# Patient Record
Sex: FEMALE | Race: WHITE | NOT HISPANIC OR LATINO | Employment: FULL TIME | URBAN - METROPOLITAN AREA
[De-identification: names, ages, dates, MRNs, and addresses within clinical notes are randomized per-mention and may not be internally consistent; named-entity substitution may affect disease eponyms.]

---

## 2023-02-24 LAB
ANION GAP IN SER/PLAS: 13 MMOL/L (ref 10–20)
C REACTIVE PROTEIN (MG/L) IN SER/PLAS: 0.13 MG/DL
CALCIUM (MG/DL) IN SER/PLAS: 9 MG/DL (ref 8.6–10.6)
CARBON DIOXIDE, TOTAL (MMOL/L) IN SER/PLAS: 26 MMOL/L (ref 21–32)
CHLORIDE (MMOL/L) IN SER/PLAS: 105 MMOL/L (ref 98–107)
CREATININE (MG/DL) IN SER/PLAS: 0.68 MG/DL (ref 0.5–1.05)
ERYTHROCYTE DISTRIBUTION WIDTH (RATIO) BY AUTOMATED COUNT: 15.6 % (ref 11.5–14.5)
ERYTHROCYTE MEAN CORPUSCULAR HEMOGLOBIN CONCENTRATION (G/DL) BY AUTOMATED: 31.4 G/DL (ref 32–36)
ERYTHROCYTE MEAN CORPUSCULAR VOLUME (FL) BY AUTOMATED COUNT: 92 FL (ref 80–100)
ERYTHROCYTES (10*6/UL) IN BLOOD BY AUTOMATED COUNT: 4.2 X10E12/L (ref 4–5.2)
GFR FEMALE: >90 ML/MIN/1.73M2
GLUCOSE (MG/DL) IN SER/PLAS: 171 MG/DL (ref 74–99)
HEMATOCRIT (%) IN BLOOD BY AUTOMATED COUNT: 38.5 % (ref 36–46)
HEMOGLOBIN (G/DL) IN BLOOD: 12.1 G/DL (ref 12–16)
LEUKOCYTES (10*3/UL) IN BLOOD BY AUTOMATED COUNT: 24.7 X10E9/L (ref 4.4–11.3)
NRBC (PER 100 WBCS) BY AUTOMATED COUNT: 0 /100 WBC (ref 0–0)
PLATELETS (10*3/UL) IN BLOOD AUTOMATED COUNT: 419 X10E9/L (ref 150–450)
POTASSIUM (MMOL/L) IN SER/PLAS: 3.9 MMOL/L (ref 3.5–5.3)
SODIUM (MMOL/L) IN SER/PLAS: 140 MMOL/L (ref 136–145)
UREA NITROGEN (MG/DL) IN SER/PLAS: 19 MG/DL (ref 6–23)

## 2023-02-27 PROBLEM — R53.83 FATIGUE: Status: ACTIVE | Noted: 2023-02-27

## 2023-02-27 PROBLEM — F43.20 GRIEF REACTION: Status: ACTIVE | Noted: 2023-02-27

## 2023-02-27 PROBLEM — K51.90 ULCERATIVE COLITIS (MULTI): Status: ACTIVE | Noted: 2023-02-27

## 2023-02-27 PROBLEM — E78.00 HYPERCHOLESTEREMIA: Status: ACTIVE | Noted: 2023-02-27

## 2023-02-27 PROBLEM — N94.9: Status: ACTIVE | Noted: 2023-02-27

## 2023-02-27 PROBLEM — R35.0 URINE FREQUENCY: Status: ACTIVE | Noted: 2023-02-27

## 2023-02-27 PROBLEM — B37.31 YEAST VAGINITIS: Status: ACTIVE | Noted: 2023-02-27

## 2023-02-27 PROBLEM — N30.90 CYSTITIS: Status: ACTIVE | Noted: 2023-02-27

## 2023-02-27 PROBLEM — K58.9 IRRITABLE BOWEL SYNDROME: Status: ACTIVE | Noted: 2023-02-27

## 2023-02-27 PROBLEM — R79.89 ELEVATED LFTS: Status: ACTIVE | Noted: 2023-02-27

## 2023-02-27 PROBLEM — M25.50 JOINT PAIN: Status: ACTIVE | Noted: 2023-02-27

## 2023-02-27 PROBLEM — K76.0 FATTY LIVER: Status: ACTIVE | Noted: 2023-02-27

## 2023-02-27 PROBLEM — R10.9 ABDOMINAL PAIN: Status: ACTIVE | Noted: 2023-02-27

## 2023-02-27 PROBLEM — R39.89 SENSATION OF PRESSURE IN BLADDER AREA: Status: ACTIVE | Noted: 2023-02-27

## 2023-02-27 PROBLEM — R30.0 DYSURIA: Status: ACTIVE | Noted: 2023-02-27

## 2023-02-27 PROBLEM — E55.9 VITAMIN D DEFICIENCY: Status: ACTIVE | Noted: 2023-02-27

## 2023-02-27 PROBLEM — F43.21 GRIEF REACTION: Status: ACTIVE | Noted: 2023-02-27

## 2023-02-27 RX ORDER — DICYCLOMINE HYDROCHLORIDE 10 MG/1
CAPSULE ORAL
COMMUNITY
Start: 2021-08-06 | End: 2023-07-12

## 2023-02-27 RX ORDER — HYDROCORTISONE SODIUM SUCCINATE 100 MG/2ML
INJECTION, POWDER, FOR SOLUTION INTRAMUSCULAR; INTRAVENOUS
COMMUNITY
Start: 2021-11-06 | End: 2023-07-12

## 2023-02-27 RX ORDER — NITROFURANTOIN 25; 75 MG/1; MG/1
CAPSULE ORAL EVERY 12 HOURS
COMMUNITY
Start: 2022-07-21 | End: 2023-07-12

## 2023-02-27 RX ORDER — DIGITAL THERAPEUTICS, IBS
MISCELLANEOUS MISCELLANEOUS
COMMUNITY
Start: 2022-04-29 | End: 2023-07-12

## 2023-02-27 RX ORDER — NORETHINDRONE 0.35 MG/1
1 TABLET ORAL DAILY
COMMUNITY
Start: 2021-04-28 | End: 2023-07-12

## 2023-02-27 RX ORDER — PREDNISONE 5 MG/1
TABLET ORAL
COMMUNITY
Start: 2022-06-27 | End: 2023-07-12

## 2023-02-27 RX ORDER — ESCITALOPRAM OXALATE 20 MG/1
TABLET ORAL
COMMUNITY
Start: 2022-04-26

## 2023-02-27 RX ORDER — ATORVASTATIN CALCIUM 20 MG/1
1 TABLET, FILM COATED ORAL NIGHTLY
COMMUNITY
End: 2023-12-01 | Stop reason: WASHOUT

## 2023-02-27 RX ORDER — ATORVASTATIN CALCIUM 10 MG/1
TABLET, FILM COATED ORAL
COMMUNITY
End: 2023-07-12

## 2023-02-27 RX ORDER — ERGOCALCIFEROL 1.25 MG/1
1 CAPSULE ORAL
COMMUNITY
Start: 2021-06-29 | End: 2024-01-18

## 2023-02-27 RX ORDER — PANTOPRAZOLE SODIUM 40 MG/1
1 TABLET, DELAYED RELEASE ORAL DAILY
COMMUNITY
Start: 2021-04-28 | End: 2023-12-01 | Stop reason: WASHOUT

## 2023-02-27 RX ORDER — ACETAMINOPHEN 325 MG/1
TABLET ORAL
COMMUNITY
Start: 2021-11-06 | End: 2023-07-12

## 2023-04-13 LAB
ALANINE AMINOTRANSFERASE (SGPT) (U/L) IN SER/PLAS: 54 U/L (ref 7–45)
ALBUMIN (G/DL) IN SER/PLAS: 4 G/DL (ref 3.4–5)
ALKALINE PHOSPHATASE (U/L) IN SER/PLAS: 99 U/L (ref 33–110)
ASPARTATE AMINOTRANSFERASE (SGOT) (U/L) IN SER/PLAS: 23 U/L (ref 9–39)
BILIRUBIN DIRECT (MG/DL) IN SER/PLAS: 0 MG/DL (ref 0–0.3)
BILIRUBIN TOTAL (MG/DL) IN SER/PLAS: 0.3 MG/DL (ref 0–1.2)
CHOLESTEROL (MG/DL) IN SER/PLAS: 257 MG/DL (ref 0–199)
CHOLESTEROL IN HDL (MG/DL) IN SER/PLAS: 77.2 MG/DL
CHOLESTEROL/HDL RATIO: 3.3
ERYTHROCYTE DISTRIBUTION WIDTH (RATIO) BY AUTOMATED COUNT: 16.1 % (ref 11.5–14.5)
ERYTHROCYTE MEAN CORPUSCULAR HEMOGLOBIN CONCENTRATION (G/DL) BY AUTOMATED: 31.3 G/DL (ref 32–36)
ERYTHROCYTE MEAN CORPUSCULAR VOLUME (FL) BY AUTOMATED COUNT: 96 FL (ref 80–100)
ERYTHROCYTES (10*6/UL) IN BLOOD BY AUTOMATED COUNT: 3.98 X10E12/L (ref 4–5.2)
HEMATOCRIT (%) IN BLOOD BY AUTOMATED COUNT: 38.3 % (ref 36–46)
HEMOGLOBIN (G/DL) IN BLOOD: 12 G/DL (ref 12–16)
LDL: 135 MG/DL (ref 0–99)
LEUKOCYTES (10*3/UL) IN BLOOD BY AUTOMATED COUNT: 15.8 X10E9/L (ref 4.4–11.3)
NON HDL CHOLESTEROL: 180 MG/DL
NRBC (PER 100 WBCS) BY AUTOMATED COUNT: 0 /100 WBC (ref 0–0)
PLATELETS (10*3/UL) IN BLOOD AUTOMATED COUNT: 341 X10E9/L (ref 150–450)
PROTEIN TOTAL: 6.6 G/DL (ref 6.4–8.2)
TRIGLYCERIDE (MG/DL) IN SER/PLAS: 226 MG/DL (ref 0–149)
VLDL: 45 MG/DL (ref 0–40)

## 2023-04-17 LAB — CALPROTECTIN, STOOL: 77 UG/G

## 2023-04-25 ENCOUNTER — TELEPHONE (OUTPATIENT)
Dept: PRIMARY CARE | Facility: CLINIC | Age: 53
End: 2023-04-25

## 2023-04-26 LAB
ABO GROUP (TYPE) IN BLOOD: NORMAL
ALANINE AMINOTRANSFERASE (SGPT) (U/L) IN SER/PLAS: 74 U/L (ref 7–45)
ALBUMIN (G/DL) IN SER/PLAS: 4 G/DL (ref 3.4–5)
ALKALINE PHOSPHATASE (U/L) IN SER/PLAS: 86 U/L (ref 33–110)
ANION GAP IN SER/PLAS: 13 MMOL/L (ref 10–20)
ANTIBODY SCREEN: NORMAL
ASPARTATE AMINOTRANSFERASE (SGOT) (U/L) IN SER/PLAS: 45 U/L (ref 9–39)
BASOPHILS (10*3/UL) IN BLOOD BY AUTOMATED COUNT: 0.05 X10E9/L (ref 0–0.1)
BASOPHILS/100 LEUKOCYTES IN BLOOD BY AUTOMATED COUNT: 0.6 % (ref 0–2)
BILIRUBIN TOTAL (MG/DL) IN SER/PLAS: 0.4 MG/DL (ref 0–1.2)
CALCIUM (MG/DL) IN SER/PLAS: 8.9 MG/DL (ref 8.6–10.3)
CARBON DIOXIDE, TOTAL (MMOL/L) IN SER/PLAS: 23 MMOL/L (ref 21–32)
CHLORIDE (MMOL/L) IN SER/PLAS: 104 MMOL/L (ref 98–107)
CREATININE (MG/DL) IN SER/PLAS: 0.74 MG/DL (ref 0.5–1.05)
EOSINOPHILS (10*3/UL) IN BLOOD BY AUTOMATED COUNT: 0.04 X10E9/L (ref 0–0.7)
EOSINOPHILS/100 LEUKOCYTES IN BLOOD BY AUTOMATED COUNT: 0.4 % (ref 0–6)
ERYTHROCYTE DISTRIBUTION WIDTH (RATIO) BY AUTOMATED COUNT: 15.4 % (ref 11.5–14.5)
ERYTHROCYTE MEAN CORPUSCULAR HEMOGLOBIN CONCENTRATION (G/DL) BY AUTOMATED: 32 G/DL (ref 32–36)
ERYTHROCYTE MEAN CORPUSCULAR VOLUME (FL) BY AUTOMATED COUNT: 94 FL (ref 80–100)
ERYTHROCYTES (10*6/UL) IN BLOOD BY AUTOMATED COUNT: 4.26 X10E12/L (ref 4–5.2)
GFR FEMALE: >90 ML/MIN/1.73M2
GLUCOSE (MG/DL) IN SER/PLAS: 94 MG/DL (ref 74–99)
HEMATOCRIT (%) IN BLOOD BY AUTOMATED COUNT: 40 % (ref 36–46)
HEMOGLOBIN (G/DL) IN BLOOD: 12.8 G/DL (ref 12–16)
IMMATURE GRANULOCYTES/100 LEUKOCYTES IN BLOOD BY AUTOMATED COUNT: 1.3 % (ref 0–0.9)
LEUKOCYTES (10*3/UL) IN BLOOD BY AUTOMATED COUNT: 9 X10E9/L (ref 4.4–11.3)
LYMPHOCYTES (10*3/UL) IN BLOOD BY AUTOMATED COUNT: 2.41 X10E9/L (ref 1.2–4.8)
LYMPHOCYTES/100 LEUKOCYTES IN BLOOD BY AUTOMATED COUNT: 26.8 % (ref 13–44)
MONOCYTES (10*3/UL) IN BLOOD BY AUTOMATED COUNT: 0.71 X10E9/L (ref 0.1–1)
MONOCYTES/100 LEUKOCYTES IN BLOOD BY AUTOMATED COUNT: 7.9 % (ref 2–10)
NEUTROPHILS (10*3/UL) IN BLOOD BY AUTOMATED COUNT: 5.65 X10E9/L (ref 1.2–7.7)
NEUTROPHILS/100 LEUKOCYTES IN BLOOD BY AUTOMATED COUNT: 63 % (ref 40–80)
PLATELETS (10*3/UL) IN BLOOD AUTOMATED COUNT: 348 X10E9/L (ref 150–450)
POTASSIUM (MMOL/L) IN SER/PLAS: 3.9 MMOL/L (ref 3.5–5.3)
PROTEIN TOTAL: 7.1 G/DL (ref 6.4–8.2)
RH FACTOR: NORMAL
SODIUM (MMOL/L) IN SER/PLAS: 136 MMOL/L (ref 136–145)
UREA NITROGEN (MG/DL) IN SER/PLAS: 12 MG/DL (ref 6–23)

## 2023-04-28 ENCOUNTER — APPOINTMENT (OUTPATIENT)
Dept: PRIMARY CARE | Facility: CLINIC | Age: 53
End: 2023-04-28
Payer: COMMERCIAL

## 2023-05-10 ENCOUNTER — APPOINTMENT (OUTPATIENT)
Dept: PRIMARY CARE | Facility: CLINIC | Age: 53
End: 2023-05-10
Payer: COMMERCIAL

## 2023-06-21 ENCOUNTER — APPOINTMENT (OUTPATIENT)
Dept: PRIMARY CARE | Facility: CLINIC | Age: 53
End: 2023-06-21
Payer: COMMERCIAL

## 2023-06-21 LAB
ALANINE AMINOTRANSFERASE (SGPT) (U/L) IN SER/PLAS: 33 U/L (ref 7–45)
ALBUMIN (G/DL) IN SER/PLAS: 4.4 G/DL (ref 3.4–5)
ALKALINE PHOSPHATASE (U/L) IN SER/PLAS: 144 U/L (ref 33–110)
ANION GAP IN SER/PLAS: 15 MMOL/L (ref 10–20)
ASPARTATE AMINOTRANSFERASE (SGOT) (U/L) IN SER/PLAS: 26 U/L (ref 9–39)
BILIRUBIN TOTAL (MG/DL) IN SER/PLAS: 0.4 MG/DL (ref 0–1.2)
CALCIUM (MG/DL) IN SER/PLAS: 9.7 MG/DL (ref 8.6–10.6)
CARBON DIOXIDE, TOTAL (MMOL/L) IN SER/PLAS: 23 MMOL/L (ref 21–32)
CHLORIDE (MMOL/L) IN SER/PLAS: 106 MMOL/L (ref 98–107)
COBALAMIN (VITAMIN B12) (PG/ML) IN SER/PLAS: 632 PG/ML (ref 211–911)
CREATININE (MG/DL) IN SER/PLAS: 0.76 MG/DL (ref 0.5–1.05)
ERYTHROCYTE DISTRIBUTION WIDTH (RATIO) BY AUTOMATED COUNT: 13.3 % (ref 11.5–14.5)
ERYTHROCYTE MEAN CORPUSCULAR HEMOGLOBIN CONCENTRATION (G/DL) BY AUTOMATED: 32.1 G/DL (ref 32–36)
ERYTHROCYTE MEAN CORPUSCULAR VOLUME (FL) BY AUTOMATED COUNT: 97 FL (ref 80–100)
ERYTHROCYTES (10*6/UL) IN BLOOD BY AUTOMATED COUNT: 3.98 X10E12/L (ref 4–5.2)
FOLATE (NG/ML) IN SER/PLAS: 22.9 NG/ML
GFR FEMALE: >90 ML/MIN/1.73M2
GLUCOSE (MG/DL) IN SER/PLAS: 90 MG/DL (ref 74–99)
HEMATOCRIT (%) IN BLOOD BY AUTOMATED COUNT: 38.6 % (ref 36–46)
HEMOGLOBIN (G/DL) IN BLOOD: 12.4 G/DL (ref 12–16)
IRON (UG/DL) IN SER/PLAS: 50 UG/DL (ref 35–150)
IRON BINDING CAPACITY (UG/DL) IN SER/PLAS: 352 UG/DL (ref 240–445)
IRON SATURATION (%) IN SER/PLAS: 14 % (ref 25–45)
LEUKOCYTES (10*3/UL) IN BLOOD BY AUTOMATED COUNT: 10 X10E9/L (ref 4.4–11.3)
NRBC (PER 100 WBCS) BY AUTOMATED COUNT: 0 /100 WBC (ref 0–0)
PLATELETS (10*3/UL) IN BLOOD AUTOMATED COUNT: 310 X10E9/L (ref 150–450)
POTASSIUM (MMOL/L) IN SER/PLAS: 5.2 MMOL/L (ref 3.5–5.3)
PROTEIN TOTAL: 7.5 G/DL (ref 6.4–8.2)
SODIUM (MMOL/L) IN SER/PLAS: 139 MMOL/L (ref 136–145)
UREA NITROGEN (MG/DL) IN SER/PLAS: 13 MG/DL (ref 6–23)

## 2023-07-12 ENCOUNTER — OFFICE VISIT (OUTPATIENT)
Dept: PRIMARY CARE | Facility: CLINIC | Age: 53
End: 2023-07-12
Payer: COMMERCIAL

## 2023-07-12 VITALS
TEMPERATURE: 96.9 F | HEART RATE: 86 BPM | BODY MASS INDEX: 21.9 KG/M2 | DIASTOLIC BLOOD PRESSURE: 82 MMHG | WEIGHT: 116 LBS | SYSTOLIC BLOOD PRESSURE: 127 MMHG | HEIGHT: 61 IN

## 2023-07-12 DIAGNOSIS — R73.03 PREDIABETES: ICD-10-CM

## 2023-07-12 DIAGNOSIS — K76.0 FATTY LIVER: ICD-10-CM

## 2023-07-12 DIAGNOSIS — F32.0 MILD MAJOR DEPRESSION (CMS-HCC): ICD-10-CM

## 2023-07-12 DIAGNOSIS — E78.01 FAMILIAL HYPERCHOLESTEROLEMIA: Primary | ICD-10-CM

## 2023-07-12 DIAGNOSIS — Z93.2 ILEOSTOMY IN PLACE (MULTI): ICD-10-CM

## 2023-07-12 DIAGNOSIS — K51.00 ULCERATIVE PANCOLITIS WITHOUT COMPLICATION (MULTI): ICD-10-CM

## 2023-07-12 DIAGNOSIS — B35.1 TOENAIL FUNGUS: ICD-10-CM

## 2023-07-12 DIAGNOSIS — F41.9 ANXIETY: ICD-10-CM

## 2023-07-12 PROCEDURE — 99214 OFFICE O/P EST MOD 30 MIN: CPT | Performed by: FAMILY MEDICINE

## 2023-07-12 PROCEDURE — 1036F TOBACCO NON-USER: CPT | Performed by: FAMILY MEDICINE

## 2023-07-12 RX ORDER — CLOTRIMAZOLE AND BETAMETHASONE DIPROPIONATE 10; .64 MG/G; MG/G
1 CREAM TOPICAL 2 TIMES DAILY
Qty: 30 G | Refills: 1 | Status: SHIPPED | OUTPATIENT
Start: 2023-07-12 | End: 2023-09-10

## 2023-07-12 ASSESSMENT — PATIENT HEALTH QUESTIONNAIRE - PHQ9
2. FEELING DOWN, DEPRESSED OR HOPELESS: NOT AT ALL
1. LITTLE INTEREST OR PLEASURE IN DOING THINGS: NOT AT ALL
SUM OF ALL RESPONSES TO PHQ9 QUESTIONS 1 AND 2: 0

## 2023-07-12 NOTE — PROGRESS NOTES
Subjective   Patient ID: Maureen See is a 52 y.o. female who presents for New Patient Visit.  Very pleasant 52-year-old here to establish care  Lifelong struggle with ulcerative colitis recently had total colectomy and now has ostomy in place feels much much better  Had had long chronic significant difficult course causing significant stress and anxiety with mild depression symptoms which was well controlled now on escitalopram  Also psoriatic arthritis and joint pain chronic joint pain rashes multiple autoimmune etiologies and symptoms and side effects  She has been improving steadily since the ostomy is caring for it very well  History of elevated liver enzymes secondary to fatty liver currently asymptomatic working on her weight  History of hyperlipidemia family history as well  No angina palpitations or syncope no cardiac history  Has had history of high cholesterol started on statin  Tolerating well with no issues  Yellow discolored toenails has had toenail fungus issues for several weeks  Some slight irritation periungual  Has tried OTC with no improvement  Longstanding history of hyperlipidemia initiated treatment with atorvastatin a few months ago  Because some body aches  Lipids were quite elevated however        Review of Systems  Constitutional: no chills, no fever and no night sweats.   Eyes: no blurred vision and no eyesight problems.   ENT: no hearing loss, no nasal congestion, no nasal discharge, no hoarseness and no sore throat.   Cardiovascular: no chest pain, no intermittent leg claudication, no lower extremity edema, no palpitations and no syncope.   Respiratory: no cough, no shortness of breath during exertion, no shortness of breath at rest and no wheezing.   Gastrointestinal: no abdominal pain, no blood in stools, no constipation, no diarrhea, no melena, no nausea, no rectal pain and no vomiting.   Genitourinary: no dysuria, no change in urinary frequency, no urinary hesitancy, no feelings of  "urinary urgency and no vaginal discharge.   Musculoskeletal: no arthralgias,  no back pain and no myalgias.   Integumentary: no new skin lesions and no rashes.   Neurological: no difficulty walking, no headache, no limb weakness, no numbness and no tingling.   Psychiatric: no anxiety, no depression, no anhedonia and no substance use disorders.   Endocrine: no recent weight gain and no recent weight loss.   Hematologic/Lymphatic: no tendency for easy bruising and no swollen glands .    Objective    /82   Pulse 86   Temp 36.1 °C (96.9 °F)   Ht 1.549 m (5' 1\")   Wt 52.6 kg (116 lb)   BMI 21.92 kg/m²    Physical Exam  The patient appeared well nourished and normally developed. Vital signs as documented. Head exam is unremarkable. No scleral icterus or corneal arcus noted.  Pupils are equal round reactive to light extraocular movements are intact no hemorrhages noted on funduscopic exam mouth mucous membranes are moist no exudates ears canals clear TMs are gray pearly not injected nose no rhinorrhea or epistaxis Neck is without jugular venous distension, thyromegaly, or carotid bruits. Carotid upstrokes are brisk bilaterally. Lungs are clear to auscultation and percussion. Cardiac exam reveals the PMI to be normally sized and situated. Rhythm is regular. First and second heart sounds normal. No murmurs, rubs or gallops. Abdominal exam reveals normal bowel sounds, no masses, no organomegaly and no aortic enlargement. Extremities are nonedematous and both femoral and pedal pulses are normal.  Neurologic exam DTRs are equal bilaterally no focal deficits strength is symmetrical heme lymph no palpable lymph nodes in the neck axilla or groin   Stoma iliem right LQ  Assessment/Plan   Problem List Items Addressed This Visit       Fatty liver     Improves with weight loss  Recommend diet and exercise   Monitor liver enzymes         Ulcerative colitis (CMS/HCC)     S/p total colectomy   asymptomatic         Prediabetes "    Relevant Orders    Hemoglobin A1C (Completed)    Familial hypercholesterolemia - Primary    Relevant Medications    rosuvastatin (Crestor) 20 mg tablet    Other Relevant Orders    Lipid Panel (Completed)    Toenail fungus    Relevant Medications    clotrimazole-betamethasone (Lotrisone) cream    Anxiety     Mild anxiety and stress due to chronic medical conditions  Stable and controlled on escitalopram         Ileostomy in place (CMS/HCC)     Stable based on symptoms and exam   Comfortable with ostomy care          Mild major depression (CMS/HCC)     Mild anxiety secondary to stress from chronic medical conditions  Asymptomatic on escitalopram                  Brianna Hernandez MD

## 2023-07-13 ENCOUNTER — LAB (OUTPATIENT)
Dept: LAB | Facility: LAB | Age: 53
End: 2023-07-13
Payer: COMMERCIAL

## 2023-07-13 DIAGNOSIS — R73.03 PREDIABETES: ICD-10-CM

## 2023-07-13 DIAGNOSIS — E78.01 FAMILIAL HYPERCHOLESTEROLEMIA: ICD-10-CM

## 2023-07-13 LAB
CHOLESTEROL (MG/DL) IN SER/PLAS: 195 MG/DL (ref 0–199)
CHOLESTEROL IN HDL (MG/DL) IN SER/PLAS: 42.6 MG/DL
CHOLESTEROL/HDL RATIO: 4.6
LDL: 103 MG/DL (ref 0–99)
NON HDL CHOLESTEROL: 152 MG/DL
TRIGLYCERIDE (MG/DL) IN SER/PLAS: 246 MG/DL (ref 0–149)
VLDL: 49 MG/DL (ref 0–40)

## 2023-07-13 PROCEDURE — 83036 HEMOGLOBIN GLYCOSYLATED A1C: CPT

## 2023-07-13 PROCEDURE — 36415 COLL VENOUS BLD VENIPUNCTURE: CPT

## 2023-07-13 PROCEDURE — 80061 LIPID PANEL: CPT

## 2023-07-14 LAB
ESTIMATED AVERAGE GLUCOSE FOR HBA1C: 111 MG/DL
HEMOGLOBIN A1C/HEMOGLOBIN TOTAL IN BLOOD: 5.5 %

## 2023-07-18 RX ORDER — ROSUVASTATIN CALCIUM 20 MG/1
20 TABLET, COATED ORAL DAILY
Qty: 90 TABLET | Refills: 3 | Status: SHIPPED | OUTPATIENT
Start: 2023-07-18 | End: 2024-07-17

## 2023-07-29 PROBLEM — F43.20 GRIEF REACTION: Status: RESOLVED | Noted: 2023-02-27 | Resolved: 2023-07-29

## 2023-07-29 PROBLEM — R10.11 POSTPRANDIAL RUQ PAIN: Status: RESOLVED | Noted: 2023-07-29 | Resolved: 2023-07-29

## 2023-07-29 PROBLEM — E78.00 HYPERCHOLESTEREMIA: Status: RESOLVED | Noted: 2023-02-27 | Resolved: 2023-07-29

## 2023-07-29 PROBLEM — B35.1 TOENAIL FUNGUS: Status: ACTIVE | Noted: 2023-07-29

## 2023-07-29 PROBLEM — N30.90 CYSTITIS: Status: RESOLVED | Noted: 2023-02-27 | Resolved: 2023-07-29

## 2023-07-29 PROBLEM — F32.0 MILD MAJOR DEPRESSION (CMS-HCC): Status: ACTIVE | Noted: 2023-07-29

## 2023-07-29 PROBLEM — R35.0 URINE FREQUENCY: Status: RESOLVED | Noted: 2023-02-27 | Resolved: 2023-07-29

## 2023-07-29 PROBLEM — E66.811 OBESITY, CLASS I, BMI 30-34.9: Status: RESOLVED | Noted: 2019-11-05 | Resolved: 2023-07-29

## 2023-07-29 PROBLEM — R30.0 DYSURIA: Status: RESOLVED | Noted: 2023-02-27 | Resolved: 2023-07-29

## 2023-07-29 PROBLEM — F41.9 ANXIETY: Status: ACTIVE | Noted: 2017-10-19

## 2023-07-29 PROBLEM — R79.89 ELEVATED LFTS: Status: RESOLVED | Noted: 2023-02-27 | Resolved: 2023-07-29

## 2023-07-29 PROBLEM — K21.9 ACID REFLUX: Status: ACTIVE | Noted: 2023-07-29

## 2023-07-29 PROBLEM — K58.9 IRRITABLE BOWEL SYNDROME: Status: RESOLVED | Noted: 2023-02-27 | Resolved: 2023-07-29

## 2023-07-29 PROBLEM — N20.0 KIDNEY STONE: Status: RESOLVED | Noted: 2017-08-17 | Resolved: 2023-07-29

## 2023-07-29 PROBLEM — B37.31 YEAST VAGINITIS: Status: RESOLVED | Noted: 2023-02-27 | Resolved: 2023-07-29

## 2023-07-29 PROBLEM — R11.0 NAUSEA IN ADULT: Status: RESOLVED | Noted: 2023-07-29 | Resolved: 2023-07-29

## 2023-07-29 PROBLEM — E66.9 OBESITY, CLASS I, BMI 30-34.9: Status: RESOLVED | Noted: 2019-11-05 | Resolved: 2023-07-29

## 2023-07-29 PROBLEM — R10.2 PELVIC PAIN IN FEMALE: Status: RESOLVED | Noted: 2018-07-12 | Resolved: 2023-07-29

## 2023-07-29 PROBLEM — D50.9 IRON DEFICIENCY ANEMIA: Status: ACTIVE | Noted: 2023-07-29

## 2023-07-29 PROBLEM — F43.21 GRIEF REACTION: Status: RESOLVED | Noted: 2023-02-27 | Resolved: 2023-07-29

## 2023-07-29 PROBLEM — N94.9: Status: RESOLVED | Noted: 2023-02-27 | Resolved: 2023-07-29

## 2023-07-29 PROBLEM — R39.89 SENSATION OF PRESSURE IN BLADDER AREA: Status: RESOLVED | Noted: 2023-02-27 | Resolved: 2023-07-29

## 2023-07-29 PROBLEM — F43.21 ADJUSTMENT DISORDER WITH DEPRESSED MOOD: Status: RESOLVED | Noted: 2017-11-14 | Resolved: 2023-07-29

## 2023-07-29 PROBLEM — B37.2 SKIN YEAST INFECTION: Status: RESOLVED | Noted: 2023-07-29 | Resolved: 2023-07-29

## 2023-07-29 PROBLEM — R10.9 ABDOMINAL PAIN: Status: RESOLVED | Noted: 2023-02-27 | Resolved: 2023-07-29

## 2023-07-29 PROBLEM — Z93.2 ILEOSTOMY IN PLACE (MULTI): Status: ACTIVE | Noted: 2023-07-29

## 2023-07-29 PROBLEM — E78.01 FAMILIAL HYPERCHOLESTEROLEMIA: Status: ACTIVE | Noted: 2023-07-29

## 2023-07-29 PROBLEM — K59.4 LEVATOR SYNDROME: Status: ACTIVE | Noted: 2023-07-29

## 2023-07-29 PROBLEM — K29.01 ACUTE HEMORRHAGIC GASTRITIS: Status: RESOLVED | Noted: 2017-11-04 | Resolved: 2023-07-29

## 2023-07-29 PROBLEM — K62.5 RECTAL BLEEDING: Status: RESOLVED | Noted: 2023-07-29 | Resolved: 2023-07-29

## 2023-07-29 PROBLEM — R19.7 ACUTE DIARRHEA: Status: RESOLVED | Noted: 2023-07-29 | Resolved: 2023-07-29

## 2023-07-29 PROBLEM — R10.11 RIGHT UPPER QUADRANT PAIN: Status: RESOLVED | Noted: 2017-06-12 | Resolved: 2023-07-29

## 2023-07-29 NOTE — PATIENT INSTRUCTIONS
Today you were seen to establish care for the multiple issues  The anxiety is very well controlled on the S-Citalopram I recommend you stay on that and you are doing very very well with that diagnosis  Hopefully this will lessen as you continue to improve without the ulcerative colitis issues  You are coping very well with the ileostomy please continue as you have been and let me know if there are any issues  For the elevated liver enzymes I will continue to monitor those and that will get better with weight loss  Since you are having some muscle aches on the atorvastatin lets discontinue that it worked very well but we can start rosuvastatin which is generic Crestor and will monitor cholesterol going forward  Please let me know if you develop any body aches  I am prescribing a topical treatment for the toenail fungus since you are having issues with elevated liver enzymes anyway and lets repeat the liver enzymes and see how you are doing  Follow-up blood test in 3 months and please call if you need anything

## 2023-11-03 ENCOUNTER — APPOINTMENT (OUTPATIENT)
Dept: GASTROENTEROLOGY | Facility: CLINIC | Age: 53
End: 2023-11-03
Payer: COMMERCIAL

## 2023-11-10 DIAGNOSIS — R10.84 GENERALIZED ABDOMINAL PAIN: Primary | ICD-10-CM

## 2023-12-01 ENCOUNTER — OFFICE VISIT (OUTPATIENT)
Dept: GASTROENTEROLOGY | Facility: CLINIC | Age: 53
End: 2023-12-01
Payer: COMMERCIAL

## 2023-12-01 VITALS
SYSTOLIC BLOOD PRESSURE: 136 MMHG | DIASTOLIC BLOOD PRESSURE: 85 MMHG | TEMPERATURE: 98.1 F | HEART RATE: 93 BPM | BODY MASS INDEX: 33.44 KG/M2 | WEIGHT: 177 LBS

## 2023-12-01 DIAGNOSIS — K51.20 ULCERATIVE PROCTITIS WITHOUT COMPLICATION (MULTI): ICD-10-CM

## 2023-12-01 DIAGNOSIS — K21.00 GASTROESOPHAGEAL REFLUX DISEASE WITH ESOPHAGITIS WITHOUT HEMORRHAGE: Primary | ICD-10-CM

## 2023-12-01 PROCEDURE — 99214 OFFICE O/P EST MOD 30 MIN: CPT | Performed by: NURSE PRACTITIONER

## 2023-12-01 PROCEDURE — 1036F TOBACCO NON-USER: CPT | Performed by: NURSE PRACTITIONER

## 2023-12-01 RX ORDER — MESALAMINE 1000 MG/1
1000 SUPPOSITORY RECTAL NIGHTLY
Qty: 14 SUPPOSITORY | Refills: 0 | Status: SHIPPED | OUTPATIENT
Start: 2023-12-01 | End: 2023-12-06 | Stop reason: WASHOUT

## 2023-12-01 RX ORDER — ESOMEPRAZOLE MAGNESIUM 40 MG/1
40 GRANULE, DELAYED RELEASE ORAL
Qty: 60 PACKET | Refills: 11 | Status: SHIPPED | OUTPATIENT
Start: 2023-12-01 | End: 2024-02-12

## 2023-12-01 NOTE — PATIENT INSTRUCTIONS
S/p Total colectomy- you are having rectal urgency and still have a rectum - I will order a mesalamine suppository to use daily at bedtime to help with this.    Reflux- I will increase the esomeprazole to twice daily    Please call in 2 weeks to let me know how you are doing  [phone]    I will see you back for follow up in 6 months

## 2023-12-01 NOTE — PROGRESS NOTES
Subjective   Patient ID: Maureen See is a 53 y.o. female.    HPI  53-year-old female for follow-up for history of ulcerative colitis  Status post total colectomy on 5/8/2023  Previously seen in clinic 6/21/2023  Has been working 9 1/2 hr days 5 days per week  Has been working more  Had bag come off last night and had to shower  Had to take occurrences for work for bathroom and has mucous coming out her rectum  Feels urgency  Next surgery not planned yet  More epigastric burning  Reflux somewhat better      Objective   Physical Exam  Cardiovascular:      Rate and Rhythm: Normal rate and regular rhythm.      Pulses: Normal pulses.      Heart sounds: Normal heart sounds.   Pulmonary:      Effort: Pulmonary effort is normal.      Breath sounds: Normal breath sounds.   Abdominal:      General: Bowel sounds are normal.      Palpations: Abdomen is soft.         Assessment/Plan     S/p Total colectomy- you are having rectal urgency and still have a rectum - I will order a mesalamine suppository to use daily at bedtime to help with this.    Reflux- I will increase the esomeprazole to twice daily    Please call in 2 weeks to let me know how you are doing  [phone]    I will see you back for follow up in 6 months

## 2023-12-05 DIAGNOSIS — Z30.9 ENCOUNTER FOR CONTRACEPTIVE MANAGEMENT, UNSPECIFIED: ICD-10-CM

## 2023-12-05 RX ORDER — NORETHINDRONE 0.35 MG/1
1 TABLET ORAL DAILY
Qty: 84 TABLET | Refills: 12 | Status: SHIPPED | OUTPATIENT
Start: 2023-12-05

## 2023-12-06 DIAGNOSIS — K51.20 ULCERATIVE PROCTITIS WITHOUT COMPLICATION (MULTI): Primary | ICD-10-CM

## 2023-12-06 RX ORDER — HYDROCORTISONE 100 MG/60ML
100 SUSPENSION RECTAL 2 TIMES DAILY
Qty: 60 EACH | Refills: 0 | OUTPATIENT
Start: 2023-12-06 | End: 2024-01-18

## 2023-12-11 NOTE — H&P (VIEW-ONLY)
History of Present Illness  Maureen See is a 53-year-old female with a long history of ulcerative colitis. She has tried multiple medications and has been requiring steroids and presented for surgery.    05/08/2023 OPERATION/PROCEDURE: Single-incision laparoscopic total abdominal colectomy with end ileostomy.  A. COLON, TOTAL COLECTOMY:  --SEGMENT OF COLON WITH MILD ARCHITECTURAL DISTORTION, PANETH CELL METAPLASIA AND MILD BASAL LYMPHOPLASMACYTIC INFLAMMATION, SEE NOTE  --FIBROUS OBLITERATION OF THE APPENDIX  --22 BENIGN LYMPH NODES WITH NO SIGNIFICANT DIAGNOSTIC CHANGE  Note: Colectomy specimen shows mild architectural distortion with Paneth cell metaplasia present in left colon with basal lymphoplasmacytic inflammation, suggestive of chronic mucosal injury. Active inflammation, ulceration,dysplasia, granuloma or malignancy are not identified.    Seen by Jessy Mo NP on December 1, 2023 for rectal urgency.  Was prescribed mesalamine suppositories.  Patient presents to the office for evaluation of abdominal pain.     Pt is eating and gaining weight.  Stoma output has been nice and thick.  She is changing every 3-4 days.  She is having mucous from the rectum stump on average weekly.  Now that she is in a flare she has mucous every few days.  Has not needed any imodium.      She is worried about the rectal stump.     Review of Systems  Constitutional: Negative for fever, chills, anorexia, weight loss, malaise                  ENMT: Negative for nasal discharge, congestion, ear pain, mouth pain, throat pain           Respiratory: Negative for cough, hemoptysis, wheezing, shortness of breath (+) ASTHMA                Cardiac: Negative for chest pain, dyspnea on exertion, orthopnea, palpitations, syncope, (+)HLD,      Gastrointestinal: Negative for nausea, vomiting, diarrhea, constipation, abdominal pain, (+)ULCERATIVE COLITIS, S/P TAC WITH END ILEOSTOMY MAY 2023     Genitourinary: Negative for discharge, dysuria,  flank pain, frequency, hematuria                   Musculoskeletal: Negative for decreased ROM, pain, swelling, weakness              Neurological: Negative for dizziness, confusion, headache, seizures, syncope       Psychiatric: Negative for mood changes, anxiety, hallucinations, sleep changes, suicidal ideas (+) ANXIETY & DEPRESSION              Skin: Negative for mass, pain, itching, rash, ulcer , (+) PSORIASIS      Endocrine: Negative for heat intolerance, cold intolerance, excessive sweating, polyuria, excess thirst            Hematologic/Lymph: Negative for anemia, bruising, easy bleeding, night sweats, petechiae, history of DVT/PE or cancer              Allergic/Immunologic: Negative for anaphylaxis, itchy/ teary eyes, itching, sneezing, swelling        Physical Exam  Constitutional: Well developed, awake/alert/oriented x3, no distress, alert and cooperative   Gastrointestinal: Nondistended, soft, non-tender, Incision CDI without hernia  Extremities: normal extremities, no cyanosis edema, contusions or wounds   Neurological: alert and oriented x3, normal strength, Normal gait     Impression - Pt doing great s/p TAC with EI for UC   Rectal stump flare    Plan -   Will try steroid suppositories  Flex sig with biopsies    Discussed proctectomy again- will proceed with flex sig and proceed from there.  She is not interested in a j pouch currently.

## 2023-12-20 ENCOUNTER — OFFICE VISIT (OUTPATIENT)
Dept: SURGERY | Facility: CLINIC | Age: 53
End: 2023-12-20
Payer: COMMERCIAL

## 2023-12-20 VITALS
SYSTOLIC BLOOD PRESSURE: 143 MMHG | DIASTOLIC BLOOD PRESSURE: 82 MMHG | WEIGHT: 176 LBS | TEMPERATURE: 97.9 F | OXYGEN SATURATION: 99 % | HEART RATE: 79 BPM | BODY MASS INDEX: 33.25 KG/M2

## 2023-12-20 DIAGNOSIS — K51.011 ULCERATIVE PANCOLITIS WITH RECTAL BLEEDING (MULTI): Primary | ICD-10-CM

## 2023-12-20 PROCEDURE — 99213 OFFICE O/P EST LOW 20 MIN: CPT | Performed by: COLON & RECTAL SURGERY

## 2023-12-20 PROCEDURE — 1036F TOBACCO NON-USER: CPT | Performed by: COLON & RECTAL SURGERY

## 2023-12-20 RX ORDER — HYDROCORTISONE ACETATE 25 MG/1
25 SUPPOSITORY RECTAL 2 TIMES DAILY
Qty: 60 SUPPOSITORY | Refills: 0 | Status: SHIPPED | OUTPATIENT
Start: 2023-12-20 | End: 2024-01-19

## 2023-12-20 ASSESSMENT — PAIN SCALES - GENERAL: PAINLEVEL: 2

## 2023-12-20 ASSESSMENT — ENCOUNTER SYMPTOMS: DEPRESSION: 0

## 2023-12-21 DIAGNOSIS — K51.00 ULCERATIVE PANCOLITIS WITHOUT COMPLICATION (MULTI): ICD-10-CM

## 2023-12-22 NOTE — NURSING NOTE
WOC nursing visit outcome:  Patient declined pouch change at this time. Able to visualize stoma through pouching system. Stoma appears red and moist through pouching system. Per patient she is getting a 3-4 day wear time, changes pouch on Monday and Thursday AM.  Patient is wearing a soft convex 1 piece coloplast  apple cut to fit with barrier ring and a belt. DME is karen with no concerns with supplies at this time. No itching or skin concerns at this time. Patient will use stomahesive powder as needed for any denudation.      Time Increment: 30 mins     Ave GUAJARDON, RN, WCC, CWOCN

## 2024-01-18 ENCOUNTER — ANESTHESIA EVENT (OUTPATIENT)
Dept: GASTROENTEROLOGY | Facility: HOSPITAL | Age: 54
End: 2024-01-18
Payer: COMMERCIAL

## 2024-01-18 ENCOUNTER — ANESTHESIA (OUTPATIENT)
Dept: GASTROENTEROLOGY | Facility: HOSPITAL | Age: 54
End: 2024-01-18
Payer: COMMERCIAL

## 2024-01-18 ENCOUNTER — HOSPITAL ENCOUNTER (OUTPATIENT)
Dept: GASTROENTEROLOGY | Facility: HOSPITAL | Age: 54
Discharge: HOME | End: 2024-01-18
Payer: COMMERCIAL

## 2024-01-18 VITALS
WEIGHT: 180.78 LBS | HEART RATE: 82 BPM | BODY MASS INDEX: 34.13 KG/M2 | RESPIRATION RATE: 16 BRPM | HEIGHT: 61 IN | OXYGEN SATURATION: 99 % | SYSTOLIC BLOOD PRESSURE: 140 MMHG | DIASTOLIC BLOOD PRESSURE: 81 MMHG | TEMPERATURE: 98.6 F

## 2024-01-18 DIAGNOSIS — K51.00 ULCERATIVE PANCOLITIS WITHOUT COMPLICATION (MULTI): Primary | ICD-10-CM

## 2024-01-18 PROCEDURE — A45331 PR SIGMOIDOSCOPY,BIOPSY

## 2024-01-18 PROCEDURE — A45331 PR SIGMOIDOSCOPY,BIOPSY: Performed by: ANESTHESIOLOGY

## 2024-01-18 PROCEDURE — 0753T DGTZ GLS MCRSCP SLD LEVEL IV: CPT | Mod: TC,SUR,AHULAB,WESLAB | Performed by: COLON & RECTAL SURGERY

## 2024-01-18 PROCEDURE — 3700000002 HC GENERAL ANESTHESIA TIME - EACH INCREMENTAL 1 MINUTE

## 2024-01-18 PROCEDURE — 7100000010 HC PHASE TWO TIME - EACH INCREMENTAL 1 MINUTE

## 2024-01-18 PROCEDURE — 45331 SIGMOIDOSCOPY AND BIOPSY: CPT | Performed by: COLON & RECTAL SURGERY

## 2024-01-18 PROCEDURE — 3700000001 HC GENERAL ANESTHESIA TIME - INITIAL BASE CHARGE

## 2024-01-18 PROCEDURE — 7100000009 HC PHASE TWO TIME - INITIAL BASE CHARGE

## 2024-01-18 PROCEDURE — 2500000004 HC RX 250 GENERAL PHARMACY W/ HCPCS (ALT 636 FOR OP/ED)

## 2024-01-18 PROCEDURE — 88305 TISSUE EXAM BY PATHOLOGIST: CPT | Performed by: PATHOLOGY

## 2024-01-18 RX ORDER — PROPOFOL 10 MG/ML
INJECTION, EMULSION INTRAVENOUS AS NEEDED
Status: DISCONTINUED | OUTPATIENT
Start: 2024-01-18 | End: 2024-01-18

## 2024-01-18 RX ORDER — SODIUM CHLORIDE, SODIUM LACTATE, POTASSIUM CHLORIDE, CALCIUM CHLORIDE 600; 310; 30; 20 MG/100ML; MG/100ML; MG/100ML; MG/100ML
20 INJECTION, SOLUTION INTRAVENOUS CONTINUOUS
Status: CANCELLED | OUTPATIENT
Start: 2024-01-18

## 2024-01-18 RX ADMIN — SODIUM CHLORIDE, POTASSIUM CHLORIDE, SODIUM LACTATE AND CALCIUM CHLORIDE: 600; 310; 30; 20 INJECTION, SOLUTION INTRAVENOUS at 07:44

## 2024-01-18 RX ADMIN — PROPOFOL 40 MG: 10 INJECTION, EMULSION INTRAVENOUS at 07:51

## 2024-01-18 RX ADMIN — PROPOFOL 60 MG: 10 INJECTION, EMULSION INTRAVENOUS at 07:50

## 2024-01-18 ASSESSMENT — PAIN - FUNCTIONAL ASSESSMENT
PAIN_FUNCTIONAL_ASSESSMENT: 0-10

## 2024-01-18 ASSESSMENT — COLUMBIA-SUICIDE SEVERITY RATING SCALE - C-SSRS
6. HAVE YOU EVER DONE ANYTHING, STARTED TO DO ANYTHING, OR PREPARED TO DO ANYTHING TO END YOUR LIFE?: NO
2. HAVE YOU ACTUALLY HAD ANY THOUGHTS OF KILLING YOURSELF?: NO
1. IN THE PAST MONTH, HAVE YOU WISHED YOU WERE DEAD OR WISHED YOU COULD GO TO SLEEP AND NOT WAKE UP?: NO

## 2024-01-18 ASSESSMENT — PAIN SCALES - GENERAL
PAINLEVEL_OUTOF10: 0 - NO PAIN

## 2024-01-18 NOTE — ANESTHESIA POSTPROCEDURE EVALUATION
Patient: Maureen See    Procedure Summary       Date: 01/18/24 Room / Location: [facility]    Anesthesia Start: 0748 Anesthesia Stop: 0804    Procedure: FLEXIBLE SIGMOIDOSCOPY Diagnosis: Ulcerative pancolitis without complication (CMS/[facility])    Scheduled Providers: Shantel De Leon MD; Ancelmo Pina MD; NEVAEH Miller; Mirlande Lemus RN Responsible Provider: Ancelmo Pina MD    Anesthesia Type: MAC ASA Status: 2            Anesthesia Type: MAC    Vitals Value Taken Time   /81 01/18/24 0900   Temp 37 °C (98.6 °F) 01/18/24 0900   Pulse 82 01/18/24 0900   Resp 16 01/18/24 0900   SpO2 99 % 01/18/24 0900       Anesthesia Post Evaluation    Patient location during evaluation: PACU  Patient participation: complete - patient participated  Level of consciousness: awake and alert  Pain management: adequate  Airway patency: patent  Cardiovascular status: acceptable and hemodynamically stable  Respiratory status: acceptable, spontaneous ventilation and nonlabored ventilation  Hydration status: acceptable  Postoperative Nausea and Vomiting: none        There were no known notable events for this encounter.

## 2024-01-18 NOTE — DISCHARGE INSTRUCTIONS
Patient Instructions after a Sigmoidoscopy      The anesthetics, sedatives or narcotics which were given to you today will be acting in your body for the next 24 hours, so you might feel a little sleepy or groggy.  This feeling should slowly wear off. Carefully read and follow the instructions.     You received sedation today:  - Do not drive or operate any machinery or power tools of any kind.   - No alcoholic beverages today, not even beer or wine.  - Do not make any important decisions or sign any legal documents.  - No over the counter medications that contain alcohol or that may cause drowsiness.  - Do not make any important decisions or sign any legal documents.    While it is common to experience mild to moderate abdominal distention, gas, or belching after your procedure, if any of these symptoms occur following discharge from the GI Lab or within one week of having your procedure, call the [facility] to be advised whether a visit to your nearest Urgent Care or Emergency Department is indicated.  Take this paper with you if you go.     - If you develop an allergic reaction to the medications that were given during your procedure such as difficulty breathing, rash, hives, severe nausea, vomiting or lightheadedness.  - If you experience chest pain, shortness of breath, severe abdominal pain, fevers and chills.  -If you develop signs and symptoms of bleeding such as blood in your spit, if your stools turn black, tarry, or bloody  - If you have not urinated within 8 hours following your procedure.  - If your IV site becomes painful, red, inflamed, or looks infected.    If you received a biopsy/polypectomy/sphincterotomy the following instructions apply below:    __ Do not use Aspirin containing products, non-steroidal medications or anti-coagulants for one week following your procedure. (Examples of these types of medications are: Advil, Arthrotec, Aleve, Coumadin, Ecotrin, Heparin, Ibuprofen,  Indocin, Motrin, Naprosyn, Nuprin, Plavix, Vioxx, and Voltarin, or their generic forms.  This list is not all-inclusive.  Check with your physician or pharmacist before resuming medications.)   __ Eat a soft diet today.  Avoid foods that are poorly digested for the next 24 hours.  These foods would include: nuts, beans, lettuce, red meats, and fried foods. Start with liquids and advance your diet as tolerated, gradually work up to eating solids.   __ Do not have a Barium Study or Enema for one week.    Your physician recommends the additional following instructions:    -You have a contact number available for emergencies. The signs and symptoms of potential delayed complications were discussed with you. You may return to normal activities tomorrow.  -Resume your previous diet.  -Continue your present medications.   -We are waiting for your pathology results.  -Your physician has recommended a repeat colonoscopy (date to be determined after pending pathology results are reviewed) for surveillance based on pathology results.  -The findings and recommendations have been discussed with you.  -The findings and recommendations were discussed with your family.  - Please see Medication Reconciliation Form for new medication/medications prescribed.       If you experience any problems or have any questions following discharge from the GI Lab, please call:        Nurse Signature                                                                        Date___________________                                                                            Patient/Responsible Party Signature                                        Date___________________

## 2024-01-18 NOTE — LETTER
Dear Ms. See,       It was my pleasure to see you again at your recent sigmoidoscopy.  At that time, multiple biopsies were taken throughout the rectum.  Pathology was completed and pathology returned with inflammation without precancerous changes. The current recommendation is to repeat the sigmoidoscopy in 1 year.       Thank you very much for allowing me to take part in your care, please feel free to contact me with any questions or concerns at [phone].          Sincerely,       Shantel De Leon M.D. TARYN, FASCRS    CC:  Primary Care:

## 2024-01-18 NOTE — POST-PROCEDURE NOTE
0800: Patient to bay with anesthesia present, plan of care reviewed. Report received from ENDO RN, AA. Initial assessment complete.    0815: Dr. De Leon to bedside to speak with pt. and family.    0820: pt tolerating sips of cranberry juice and pretzels. Pt using incentive spirometer.    0845: discharge instructions reviewed with family and pt. Pt and family verbalized understanding.    0900: pt assisted with dressing by family.    0905: Iv removed without difficulty. Cath intact upon removal.    0915: pt assisted to the bathroom.    0921: pt transported down to main lobby via wheelchair and transport.

## 2024-01-25 LAB
LABORATORY COMMENT REPORT: NORMAL
PATH REPORT.FINAL DX SPEC: NORMAL
PATH REPORT.GROSS SPEC: NORMAL
PATH REPORT.TOTAL CANCER: NORMAL

## 2024-02-02 NOTE — ADDENDUM NOTE
Encounter addended by: Shantel De Leon MD on: 2/2/2024 12:37 PM   Actions taken: Results reviewed in IB

## 2024-02-12 DIAGNOSIS — K21.9 GASTROESOPHAGEAL REFLUX DISEASE WITHOUT ESOPHAGITIS: Primary | ICD-10-CM

## 2024-02-12 RX ORDER — ESOMEPRAZOLE MAGNESIUM 40 MG/1
40 CAPSULE, DELAYED RELEASE ORAL 2 TIMES DAILY
Qty: 180 CAPSULE | Refills: 3 | Status: SHIPPED | OUTPATIENT
Start: 2024-02-12 | End: 2024-02-14 | Stop reason: SDUPTHER

## 2024-02-14 DIAGNOSIS — K21.9 GASTROESOPHAGEAL REFLUX DISEASE WITHOUT ESOPHAGITIS: ICD-10-CM

## 2024-02-14 RX ORDER — ESOMEPRAZOLE MAGNESIUM 40 MG/1
40 CAPSULE, DELAYED RELEASE ORAL 2 TIMES DAILY
Qty: 180 CAPSULE | Refills: 3 | Status: SHIPPED | OUTPATIENT
Start: 2024-02-14 | End: 2025-02-13

## 2024-03-01 ENCOUNTER — APPOINTMENT (OUTPATIENT)
Dept: PRIMARY CARE | Facility: CLINIC | Age: 54
End: 2024-03-01
Payer: COMMERCIAL

## 2024-03-06 ENCOUNTER — APPOINTMENT (OUTPATIENT)
Dept: DERMATOLOGY | Facility: CLINIC | Age: 54
End: 2024-03-06
Payer: COMMERCIAL

## 2024-03-14 DIAGNOSIS — K21.9 GASTROESOPHAGEAL REFLUX DISEASE WITHOUT ESOPHAGITIS: Primary | ICD-10-CM

## 2024-03-14 RX ORDER — FAMOTIDINE 40 MG/1
40 TABLET, FILM COATED ORAL 2 TIMES DAILY
Qty: 60 TABLET | Refills: 11 | Status: SHIPPED | OUTPATIENT
Start: 2024-03-14 | End: 2024-05-07 | Stop reason: WASHOUT

## 2024-03-22 DIAGNOSIS — Z93.2 ILEOSTOMY IN PLACE (MULTI): Primary | ICD-10-CM

## 2024-04-11 ENCOUNTER — CLINICAL SUPPORT (OUTPATIENT)
Dept: SURGERY | Facility: CLINIC | Age: 54
End: 2024-04-11
Payer: COMMERCIAL

## 2024-04-11 DIAGNOSIS — Z93.2 ILEOSTOMY IN PLACE (MULTI): ICD-10-CM

## 2024-04-11 PROCEDURE — 99211 OFF/OP EST MAY X REQ PHY/QHP: CPT | Performed by: COLON & RECTAL SURGERY

## 2024-04-11 NOTE — NURSING NOTE
"[facility] nursing visit outcome: Mrs. See came to the stoma clinic d/t parastomal pain - under the skin. The pain is aching, and is persistent when she is sitting in her chair at work.   The right side of her abdomen bulges compared to the left side.   Stoma is moist, red and is functioning well.    She was measured for a Coloplast Support Belt (binder). A DeRoyal 9\" Sm/Med binder was placed today and hole was cut to accommodate the pouch.     [facility] next scheduled visit/plan: may see ELISA Holcomb or Dr. De Leon regarding the pain; will see [facility] RN when she receives the Coloplast binder from [employer]    Stoma Type: Ileostomy  Stanley: No  Location: RLQ and near umbilicus  Protrusion: Budded  Mucosal Condition and Color: Moist, Red  Peristomal Contour: Rounded  Supportive Tissue: Combination semi-soft to firm  Character of Output: Green and Thick/Mushy Liquid  Emptying Frequency: not assessed today  Removed/Current Pouching System: Coloplast [place] , Soft Convex, and Drainable,  Belt    Supplier: [place] - Coloplast binder will be ordered (2 XL #[id number])    Comments: Daughter sews and can finished the hole cut into the DeRoyal binder that was used today.     Time Increment: 45 minutes    Brianna Verma, BENNETTN, RN, CWOCN     "

## 2024-04-23 ENCOUNTER — HOSPITAL ENCOUNTER (EMERGENCY)
Facility: HOSPITAL | Age: 54
Discharge: HOME | End: 2024-04-24
Attending: EMERGENCY MEDICINE
Payer: COMMERCIAL

## 2024-04-23 VITALS
RESPIRATION RATE: 19 BRPM | HEIGHT: 61 IN | BODY MASS INDEX: 33.61 KG/M2 | OXYGEN SATURATION: 100 % | WEIGHT: 178 LBS | DIASTOLIC BLOOD PRESSURE: 88 MMHG | TEMPERATURE: 98.8 F | SYSTOLIC BLOOD PRESSURE: 166 MMHG | HEART RATE: 78 BPM

## 2024-04-23 DIAGNOSIS — R10.9 ABDOMINAL PAIN, UNSPECIFIED ABDOMINAL LOCATION: Primary | ICD-10-CM

## 2024-04-23 LAB
BASOPHILS # BLD AUTO: 0.06 X10*3/UL (ref 0–0.1)
BASOPHILS NFR BLD AUTO: 0.7 %
EOSINOPHIL # BLD AUTO: 0.24 X10*3/UL (ref 0–0.7)
EOSINOPHIL NFR BLD AUTO: 2.7 %
ERYTHROCYTE [DISTWIDTH] IN BLOOD BY AUTOMATED COUNT: 13.5 % (ref 11.5–14.5)
HCT VFR BLD AUTO: 38.4 % (ref 36–46)
HGB BLD-MCNC: 12.8 G/DL (ref 12–16)
IMM GRANULOCYTES # BLD AUTO: 0.04 X10*3/UL (ref 0–0.7)
IMM GRANULOCYTES NFR BLD AUTO: 0.5 % (ref 0–0.9)
LYMPHOCYTES # BLD AUTO: 2.15 X10*3/UL (ref 1.2–4.8)
LYMPHOCYTES NFR BLD AUTO: 24.5 %
MCH RBC QN AUTO: 29.6 PG (ref 26–34)
MCHC RBC AUTO-ENTMCNC: 33.3 G/DL (ref 32–36)
MCV RBC AUTO: 89 FL (ref 80–100)
MONOCYTES # BLD AUTO: 0.65 X10*3/UL (ref 0.1–1)
MONOCYTES NFR BLD AUTO: 7.4 %
NEUTROPHILS # BLD AUTO: 5.63 X10*3/UL (ref 1.2–7.7)
NEUTROPHILS NFR BLD AUTO: 64.2 %
NRBC BLD-RTO: 0 /100 WBCS (ref 0–0)
PLATELET # BLD AUTO: 242 X10*3/UL (ref 150–450)
RBC # BLD AUTO: 4.33 X10*6/UL (ref 4–5.2)
WBC # BLD AUTO: 8.8 X10*3/UL (ref 4.4–11.3)

## 2024-04-23 PROCEDURE — 85025 COMPLETE CBC W/AUTO DIFF WBC: CPT | Performed by: EMERGENCY MEDICINE

## 2024-04-23 PROCEDURE — 80053 COMPREHEN METABOLIC PANEL: CPT | Performed by: EMERGENCY MEDICINE

## 2024-04-23 PROCEDURE — 99284 EMERGENCY DEPT VISIT MOD MDM: CPT | Mod: 25

## 2024-04-23 PROCEDURE — 2500000004 HC RX 250 GENERAL PHARMACY W/ HCPCS (ALT 636 FOR OP/ED): Performed by: EMERGENCY MEDICINE

## 2024-04-23 PROCEDURE — 83690 ASSAY OF LIPASE: CPT | Performed by: EMERGENCY MEDICINE

## 2024-04-23 PROCEDURE — 96374 THER/PROPH/DIAG INJ IV PUSH: CPT

## 2024-04-23 PROCEDURE — 96375 TX/PRO/DX INJ NEW DRUG ADDON: CPT

## 2024-04-23 PROCEDURE — 36415 COLL VENOUS BLD VENIPUNCTURE: CPT | Performed by: EMERGENCY MEDICINE

## 2024-04-23 PROCEDURE — 83605 ASSAY OF LACTIC ACID: CPT | Performed by: EMERGENCY MEDICINE

## 2024-04-23 RX ORDER — ONDANSETRON HYDROCHLORIDE 2 MG/ML
4 INJECTION, SOLUTION INTRAVENOUS ONCE
Status: COMPLETED | OUTPATIENT
Start: 2024-04-23 | End: 2024-04-23

## 2024-04-23 RX ADMIN — ONDANSETRON 4 MG: 2 INJECTION INTRAMUSCULAR; INTRAVENOUS at 23:43

## 2024-04-23 RX ADMIN — HYDROMORPHONE HYDROCHLORIDE 0.5 MG: 1 INJECTION, SOLUTION INTRAMUSCULAR; INTRAVENOUS; SUBCUTANEOUS at 23:42

## 2024-04-23 ASSESSMENT — COLUMBIA-SUICIDE SEVERITY RATING SCALE - C-SSRS
1. IN THE PAST MONTH, HAVE YOU WISHED YOU WERE DEAD OR WISHED YOU COULD GO TO SLEEP AND NOT WAKE UP?: NO
6. HAVE YOU EVER DONE ANYTHING, STARTED TO DO ANYTHING, OR PREPARED TO DO ANYTHING TO END YOUR LIFE?: NO
2. HAVE YOU ACTUALLY HAD ANY THOUGHTS OF KILLING YOURSELF?: NO

## 2024-04-23 NOTE — Clinical Note
Maureen See was seen and treated in our emergency department on 4/23/2024.  She may return to work on 04/25/2024.       If you have any questions or concerns, please don't hesitate to call.      Mohamud Gutierrez MD

## 2024-04-23 NOTE — Clinical Note
Maureen See was seen and treated in our emergency department on 4/23/2024.  She may return to work on 04/24/2024.       If you have any questions or concerns, please don't hesitate to call.      Mohamud Gutierrez MD

## 2024-04-23 NOTE — Clinical Note
Maureen See was seen and treated in our emergency department on 4/23/2024.  She may return to work on 04/25/2024.  You injured the musculoskeletal area around the abdomen.  No intra-abdominal injuries seen     If you have any questions or concerns, please don't hesitate to call.      Varinder Rocha MD

## 2024-04-24 ENCOUNTER — APPOINTMENT (OUTPATIENT)
Dept: RADIOLOGY | Facility: HOSPITAL | Age: 54
End: 2024-04-24
Payer: COMMERCIAL

## 2024-04-24 LAB
ALBUMIN SERPL BCP-MCNC: 4.3 G/DL (ref 3.4–5)
ALP SERPL-CCNC: 107 U/L (ref 33–110)
ALT SERPL W P-5'-P-CCNC: 56 U/L (ref 7–45)
ANION GAP SERPL CALC-SCNC: 11 MMOL/L (ref 10–20)
APPEARANCE UR: CLEAR
AST SERPL W P-5'-P-CCNC: 45 U/L (ref 9–39)
BILIRUB SERPL-MCNC: 0.4 MG/DL (ref 0–1.2)
BILIRUB UR STRIP.AUTO-MCNC: NEGATIVE MG/DL
BUN SERPL-MCNC: 13 MG/DL (ref 6–23)
CALCIUM SERPL-MCNC: 8.6 MG/DL (ref 8.6–10.3)
CHLORIDE SERPL-SCNC: 104 MMOL/L (ref 98–107)
CO2 SERPL-SCNC: 26 MMOL/L (ref 21–32)
COLOR UR: ABNORMAL
CREAT SERPL-MCNC: 0.72 MG/DL (ref 0.5–1.05)
EGFRCR SERPLBLD CKD-EPI 2021: >90 ML/MIN/1.73M*2
GLUCOSE SERPL-MCNC: 148 MG/DL (ref 74–99)
GLUCOSE UR STRIP.AUTO-MCNC: ABNORMAL MG/DL
HYALINE CASTS #/AREA URNS AUTO: ABNORMAL /LPF
KETONES UR STRIP.AUTO-MCNC: NEGATIVE MG/DL
LACTATE SERPL-SCNC: 1.3 MMOL/L (ref 0.4–2)
LEUKOCYTE ESTERASE UR QL STRIP.AUTO: NEGATIVE
LIPASE SERPL-CCNC: 14 U/L (ref 9–82)
MUCOUS THREADS #/AREA URNS AUTO: ABNORMAL /LPF
NITRITE UR QL STRIP.AUTO: NEGATIVE
PH UR STRIP.AUTO: 6 [PH]
POTASSIUM SERPL-SCNC: 3.4 MMOL/L (ref 3.5–5.3)
PROT SERPL-MCNC: 7.1 G/DL (ref 6.4–8.2)
PROT UR STRIP.AUTO-MCNC: NEGATIVE MG/DL
RBC # UR STRIP.AUTO: ABNORMAL /UL
RBC #/AREA URNS AUTO: ABNORMAL /HPF
SODIUM SERPL-SCNC: 138 MMOL/L (ref 136–145)
SP GR UR STRIP.AUTO: 1.01
SQUAMOUS #/AREA URNS AUTO: ABNORMAL /HPF
UROBILINOGEN UR STRIP.AUTO-MCNC: NORMAL MG/DL
WBC #/AREA URNS AUTO: ABNORMAL /HPF

## 2024-04-24 PROCEDURE — 74177 CT ABD & PELVIS W/CONTRAST: CPT | Performed by: RADIOLOGY

## 2024-04-24 PROCEDURE — 2550000001 HC RX 255 CONTRASTS: Performed by: EMERGENCY MEDICINE

## 2024-04-24 PROCEDURE — 74177 CT ABD & PELVIS W/CONTRAST: CPT

## 2024-04-24 PROCEDURE — 81001 URINALYSIS AUTO W/SCOPE: CPT | Performed by: EMERGENCY MEDICINE

## 2024-04-24 RX ORDER — CYCLOBENZAPRINE HCL 10 MG
10 TABLET ORAL 3 TIMES DAILY PRN
Qty: 21 TABLET | Refills: 0 | Status: SHIPPED | OUTPATIENT
Start: 2024-04-24 | End: 2024-05-30 | Stop reason: WASHOUT

## 2024-04-24 RX ADMIN — IOHEXOL 75 ML: 350 INJECTION, SOLUTION INTRAVENOUS at 01:10

## 2024-04-24 NOTE — PROGRESS NOTES
Emergency Medicine Transition of Care Note.    I received Maureen See in signout from Dr. Gutierrez.  Please see the previous ED provider note for all HPI, PE and MDM up to the time of signout. This is in addition to the primary record.    In brief Maureen See is an 53 y.o. female presenting for   Chief Complaint   Patient presents with    Abdominal Pain     Pt tripped and fell on her stomach and is c/o of some pain in her abd with some nausea, pt has an ostomy and she said she noticed a tinge of blood in the bag, denies vomiting     At the time of signout we were awaiting: CT    Diagnoses as of 04/24/24 0304   Abdominal pain, unspecified abdominal location       Medical Decision Making  The patient currently fell today twisted her stoma.  There is no obvious external injury.  No obvious bleeding.  No internal bleeding or signs of bowel rupture.  Necessary traumatic injuries feel patient be discharged home.  There was some thickened endometrium which they suggested an outpatient ultrasound.  I did inform the patient of this verbally.  And wrote this in her discharge instructions.    Final diagnoses:   [R10.9] Abdominal pain, unspecified abdominal location           Procedure  Procedures    Varinder Rocha MD

## 2024-04-24 NOTE — DISCHARGE INSTRUCTIONS
You have a thickened uterine stripe and need an outpatient ultrasound of your pelvis.  Discussed this with your primary doctor or gynecologist as soon as possible.

## 2024-04-24 NOTE — ED PROVIDER NOTES
HPI   Chief Complaint   Patient presents with    Abdominal Pain     Pt tripped and fell on her stomach and is c/o of some pain in her abd with some nausea, pt has an ostomy and she said she noticed a tinge of blood in the bag, denies vomiting       This is a 53-year-old female with a history of ulcerative colitis who presents to the emergency department complaining of abdominal pain.  The patient had a fall while taking her dog out today around 1:30 in the afternoon.  The patient states that after the fall she had a small amount of blood in her ostomy bag.  This has since resolved.  She, however, has had increasing pain on the right side of her abdomen.  She reports nausea but no vomiting.  She denies hematuria or dysuria.  She does not believe that she directly impacted her abdomen.  She denies hitting her head.  She denies loss of consciousness.  She denies neck pain.                          No data recorded                   Patient History   Past Medical History:   Diagnosis Date    Acute diarrhea 2023    Acute hemorrhagic gastritis 2017    dx dr frankel egd 10/18/17    Adjustment disorder with depressed mood 2017    Anemia     Anxiety 2019    Asthma ([facility] 1994    Food intolerance     GERD (gastroesophageal reflux disease)     Inflammatory bowel disease 2020    Kidney stone 2017    stable small non obstructing L    Liver disease     Nausea in adult 2023    Obesity, Class I, BMI 30-34.9 2019    Rectal bleeding 2023    Skin yeast infection 2023    Ulcerative colitis ([facility])      Past Surgical History:   Procedure Laterality Date     SECTION, LOW TRANSVERSE  10/14/1992 2000    COLON SURGERY  2023    TOTAL COLECTOMY  2023    end ileostomy    WISDOM TOOTH EXTRACTION  2013     Family History   Problem Relation Name Age of Onset    Arthritis Mother Pam Nancy     Hypertension Mother Pam Nancy     Heart disease Father Jordan Nancy      Hypertension Father Jordan Cruz     Colon polyps Father Jordan Cruz     Cancer Maternal Grandmother Morena cid     Diabetes Maternal Grandmother Morena cid     Colon cancer Maternal Grandmother Morena cid     Cancer Paternal Grandfather Tyrell cruz     Cancer Father's Brother Gianfranco Cruz     Diabetes Son Adam Wetzel     Diabetes Brother Raleigh Cruz     Hypertension Brother Raleigh Cruz      Social History     Tobacco Use    Smoking status: Former     Current packs/day: 0.00     Average packs/day: 0.5 packs/day for 31.4 years (15.7 ttl pk-yrs)     Types: Cigarettes     Start date: 1984     Quit date: 2015     Years since quittin.6    Smokeless tobacco: Never   Substance Use Topics    Alcohol use: Not Currently     Alcohol/week: 1.0 standard drink of alcohol     Types: 1 Glasses of wine per week    Drug use: Never       Physical Exam   ED Triage Vitals [24 2220]   Temperature Heart Rate Respirations BP   37.1 °C (98.8 °F) 78 19 166/88      Pulse Ox Temp src Heart Rate Source Patient Position   100 % -- -- --      BP Location FiO2 (%)     -- --       Physical Exam  Vitals and nursing note reviewed.   HENT:      Head: Normocephalic and atraumatic.      Nose: Nose normal.   Eyes:      Conjunctiva/sclera: Conjunctivae normal.   Cardiovascular:      Rate and Rhythm: Normal rate and regular rhythm.      Pulses: Normal pulses.      Heart sounds: Normal heart sounds.   Pulmonary:      Effort: Pulmonary effort is normal.      Breath sounds: Normal breath sounds.   Abdominal:      General: Bowel sounds are normal.      Palpations: Abdomen is soft.      Tenderness: There is abdominal tenderness in the right upper quadrant, right lower quadrant and suprapubic area.      Comments: Ostomy in the right lower quadrant.   Musculoskeletal:         General: Normal range of motion.      Cervical back: Normal range of motion and neck supple.   Skin:     Findings: No rash.   Neurological:      General: No  focal deficit present.      Mental Status: She is alert and oriented to person, place, and time.   Psychiatric:         Mood and Affect: Mood normal.         ED Course & MDM   Diagnoses as of 04/24/24 0108   Abdominal pain, unspecified abdominal location       Medical Decision Making  Differential diagnosis: I have considered the following conditions during my assessment of this patient's condition: Gastritis, gastroenteritis, GERD, food poisoning, ileus, bowel obstruction, hernia, acute appendicitis, cholecystitis, diverticulitis, colitis, renal colic.    This is a 53-year-old female who presents to the emergency department with abdominal pain after a fall.  She will be treated with Dilaudid and Zofran.  She will be further evaluated with labs and CT of the abdomen/pelvis.  The patient's labs showed a mildly elevated glucose of 148 and mildly low potassium of 3.4.  AST and ALT were mildly elevated.  Lactate and lipase were normal.  White blood count was normal.  CT scan was pending at time of signout.        Procedure  Procedures     Mohamud Gutierrez MD  04/24/24 0109

## 2024-04-25 DIAGNOSIS — R93.89 THICKENED ENDOMETRIUM: Primary | ICD-10-CM

## 2024-04-29 ENCOUNTER — HOSPITAL ENCOUNTER (OUTPATIENT)
Dept: RADIOLOGY | Facility: CLINIC | Age: 54
Discharge: HOME | End: 2024-04-29
Payer: COMMERCIAL

## 2024-04-29 DIAGNOSIS — R93.89 THICKENED ENDOMETRIUM: ICD-10-CM

## 2024-04-29 DIAGNOSIS — R93.89 ABNORMAL FINDINGS ON DIAGNOSTIC IMAGING OF OTHER SPECIFIED BODY STRUCTURES: ICD-10-CM

## 2024-04-29 PROCEDURE — 76856 US EXAM PELVIC COMPLETE: CPT

## 2024-04-29 PROCEDURE — 76830 TRANSVAGINAL US NON-OB: CPT

## 2024-04-29 PROCEDURE — 76857 US EXAM PELVIC LIMITED: CPT | Performed by: RADIOLOGY

## 2024-04-29 PROCEDURE — 76830 TRANSVAGINAL US NON-OB: CPT | Performed by: RADIOLOGY

## 2024-04-29 NOTE — PROGRESS NOTES
History of Present Illness  Maureen See is a 53-year-old female with a long history of ulcerative colitis. She has tried multiple medications and has been requiring steroids and presented for surgery.  She is s/p 5/2023 Laparoscopic TAC with end ileostomy.  At her last visit December 2023 she was not interested in a J pouch.  She underwent a sigmoidoscopy January 2024 for surveillance of the rectal stump.       05/08/2023 OPERATION/PROCEDURE: Single-incision laparoscopic total abdominal colectomy with end ileostomy.  A. COLON, TOTAL COLECTOMY:  --SEGMENT OF COLON WITH MILD ARCHITECTURAL DISTORTION, PANETH CELL METAPLASIA AND MILD BASAL LYMPHOPLASMACYTIC INFLAMMATION, SEE NOTE  --FIBROUS OBLITERATION OF THE APPENDIX  --22 BENIGN LYMPH NODES WITH NO SIGNIFICANT DIAGNOSTIC CHANGE  Note: Colectomy specimen shows mild architectural distortion with Paneth cell metaplasia present in left colon with basal lymphoplasmacytic inflammation, suggestive of chronic mucosal injury. Active inflammation, ulceration,dysplasia, granuloma or malignancy are not identified.       1/18/2024 Sigmoidoscopy   Findings  Generalized edematous mucosa with loss of vascular pattern; no bleeding was identified; performed cold forceps biopsy for dysplasia screening. Mucosa was relatively normal - no bleeding on insertion - made it to the staple line.  Formed mucous in the rectal vault.  6 biopsies taken for dysplasia screening no lesions noted  Pathology:  A. RECTUM, BIOPSY:  -Colon mucosa with patchy chronic inflammation with focal activity.  See note.  -No dysplasia identified.        Note: The biopsy shows colon mucosa with patchy chronic inflammatory changes with focal acute cryptitis.  No crypt abscesses or granulomas are identified.    Presented to ER after falling while walking her dog.  She was c/o abdominal pain and some blood in the ostomy bag.  A CT abd/pelvis with contrast was ordered.    4/24/2024 CT Abd/Pelvis with contrast  1.   Circumferential wall thickening of the urinary bladder, may be secondary to underdistention. Please correlate with urinalysis to exclude superimposed cystitis. Otherwise, no acute intra-abdominal findings.  2. Status post total colectomy with a right-sided ostomy.  3. Hepatomegaly. Fatty infiltration of the liver.  4. Suggestion of endometrial thickening. Nonemergent pelvic ultrasound can be obtained for further evaluation.      Review of Systems  Constitutional: Negative for fever, chills, anorexia, weight loss, malaise                  ENMT: Negative for nasal discharge, congestion, ear pain, mouth pain, throat pain           Respiratory: Negative for cough, hemoptysis, wheezing, shortness of breath (+) ASTHMA                Cardiac: Negative for chest pain, dyspnea on exertion, orthopnea, palpitations, syncope, (+)HLD,      Gastrointestinal: Negative for nausea, vomiting, diarrhea, constipation, abdominal pain, (+)ULCERATIVE COLITIS, S/P TAC WITH END ILEOSTOMY MAY 2023     Genitourinary: Negative for discharge, dysuria, flank pain, frequency, hematuria                   Musculoskeletal: Negative for decreased ROM, pain, swelling, weakness              Neurological: Negative for dizziness, confusion, headache, seizures, syncope       Psychiatric: Negative for mood changes, anxiety, hallucinations, sleep changes, suicidal ideas (+) ANXIETY & DEPRESSION              Skin: Negative for mass, pain, itching, rash, ulcer , (+) PSORIASIS      Endocrine: Negative for heat intolerance, cold intolerance, excessive sweating, polyuria, excess thirst            Hematologic/Lymph: Negative for anemia, bruising, easy bleeding, night sweats, petechiae, history of DVT/PE or cancer              Allergic/Immunologic: Negative for anaphylaxis, itchy/ teary eyes, itching, sneezing, swelling     Physical Exam  Constitutional: Well developed, awake/alert/oriented x3, no distress, alert and cooperative             Eyes: Sclera anicteric,  no conjunctival inflammation, conjugate gaze    ENMT: mucous membranes moist, no apparent injury,            Head/Neck: Neck supple, no apparent injury, No JVD, trachea midline, no bruits              Respiratory/Thorax: Patent airways, CTAB, normal breath sounds with good chest expansion, thorax symmetric         Cardiovascular: Regular, rate and rhythm, no murmurs, normal S1 and S2         Gastrointestinal: Nondistended, soft, non-tender, no rebound tenderness or guarding, no masses palpable, no organomegaly, +BS, no bruits               Extremities: normal extremities, no cyanosis edema, contusions or wounds, 2+ femoral pulses B/L              Neurological: alert and oriented x3, normal strength, Normal gait          Lymphatic: No palpable inguinal lymphadenopathy   Psychological: Appropriate mood and behavior         Skin: Warm and dry, no lesions, no rashes                Anorectal:      Impression - s/p TAC with EI for UC      Plan -

## 2024-05-01 ENCOUNTER — LAB (OUTPATIENT)
Dept: LAB | Facility: LAB | Age: 54
End: 2024-05-01
Payer: COMMERCIAL

## 2024-05-01 ENCOUNTER — PATIENT MESSAGE (OUTPATIENT)
Dept: SURGERY | Facility: CLINIC | Age: 54
End: 2024-05-01

## 2024-05-01 ENCOUNTER — APPOINTMENT (OUTPATIENT)
Dept: PRIMARY CARE | Facility: CLINIC | Age: 54
End: 2024-05-01
Payer: COMMERCIAL

## 2024-05-01 DIAGNOSIS — Z93.2 ILEOSTOMY IN PLACE (MULTI): ICD-10-CM

## 2024-05-01 LAB
ALBUMIN SERPL BCP-MCNC: 4.4 G/DL (ref 3.4–5)
ALP SERPL-CCNC: 110 U/L (ref 33–110)
ALT SERPL W P-5'-P-CCNC: 53 U/L (ref 7–45)
ANION GAP SERPL CALC-SCNC: 13 MMOL/L (ref 10–20)
AST SERPL W P-5'-P-CCNC: 36 U/L (ref 9–39)
BILIRUB SERPL-MCNC: 0.4 MG/DL (ref 0–1.2)
BUN SERPL-MCNC: 11 MG/DL (ref 6–23)
CALCIUM SERPL-MCNC: 9.4 MG/DL (ref 8.6–10.6)
CHLORIDE SERPL-SCNC: 103 MMOL/L (ref 98–107)
CO2 SERPL-SCNC: 29 MMOL/L (ref 21–32)
CREAT SERPL-MCNC: 0.62 MG/DL (ref 0.5–1.05)
CRP SERPL-MCNC: 0.74 MG/DL
EGFRCR SERPLBLD CKD-EPI 2021: >90 ML/MIN/1.73M*2
ERYTHROCYTE [DISTWIDTH] IN BLOOD BY AUTOMATED COUNT: 13.6 % (ref 11.5–14.5)
GLUCOSE SERPL-MCNC: 105 MG/DL (ref 74–99)
HCT VFR BLD AUTO: 39.7 % (ref 36–46)
HGB BLD-MCNC: 13 G/DL (ref 12–16)
MAGNESIUM SERPL-MCNC: 2.14 MG/DL (ref 1.6–2.4)
MCH RBC QN AUTO: 30.3 PG (ref 26–34)
MCHC RBC AUTO-ENTMCNC: 32.7 G/DL (ref 32–36)
MCV RBC AUTO: 93 FL (ref 80–100)
NRBC BLD-RTO: 0 /100 WBCS (ref 0–0)
PLATELET # BLD AUTO: 254 X10*3/UL (ref 150–450)
POTASSIUM SERPL-SCNC: 4.4 MMOL/L (ref 3.5–5.3)
PROT SERPL-MCNC: 7.1 G/DL (ref 6.4–8.2)
RBC # BLD AUTO: 4.29 X10*6/UL (ref 4–5.2)
SODIUM SERPL-SCNC: 141 MMOL/L (ref 136–145)
WBC # BLD AUTO: 8.6 X10*3/UL (ref 4.4–11.3)

## 2024-05-01 PROCEDURE — 85027 COMPLETE CBC AUTOMATED: CPT

## 2024-05-01 PROCEDURE — 83735 ASSAY OF MAGNESIUM: CPT

## 2024-05-01 PROCEDURE — 86140 C-REACTIVE PROTEIN: CPT

## 2024-05-01 PROCEDURE — 80053 COMPREHEN METABOLIC PANEL: CPT

## 2024-05-01 PROCEDURE — 84630 ASSAY OF ZINC: CPT

## 2024-05-01 PROCEDURE — 36415 COLL VENOUS BLD VENIPUNCTURE: CPT

## 2024-05-03 LAB — ZINC SERPL-MCNC: 81.8 UG/DL (ref 60–120)

## 2024-05-07 ENCOUNTER — LAB (OUTPATIENT)
Dept: LAB | Facility: LAB | Age: 54
End: 2024-05-07
Payer: COMMERCIAL

## 2024-05-07 ENCOUNTER — OFFICE VISIT (OUTPATIENT)
Dept: GASTROENTEROLOGY | Facility: HOSPITAL | Age: 54
End: 2024-05-07
Payer: COMMERCIAL

## 2024-05-07 VITALS
HEART RATE: 75 BPM | RESPIRATION RATE: 20 BRPM | WEIGHT: 181 LBS | TEMPERATURE: 97.8 F | OXYGEN SATURATION: 98 % | BODY MASS INDEX: 34.2 KG/M2 | DIASTOLIC BLOOD PRESSURE: 89 MMHG | SYSTOLIC BLOOD PRESSURE: 151 MMHG

## 2024-05-07 DIAGNOSIS — R10.84 GENERALIZED ABDOMINAL PAIN: ICD-10-CM

## 2024-05-07 DIAGNOSIS — R10.84 GENERALIZED ABDOMINAL PAIN: Primary | ICD-10-CM

## 2024-05-07 PROCEDURE — 99214 OFFICE O/P EST MOD 30 MIN: CPT | Performed by: NURSE PRACTITIONER

## 2024-05-07 PROCEDURE — 87086 URINE CULTURE/COLONY COUNT: CPT

## 2024-05-07 RX ORDER — DICYCLOMINE HYDROCHLORIDE 10 MG/1
10 CAPSULE ORAL
Qty: 120 CAPSULE | Refills: 11 | Status: SHIPPED | OUTPATIENT
Start: 2024-05-07 | End: 2025-05-07

## 2024-05-07 ASSESSMENT — PAIN SCALES - GENERAL: PAINLEVEL: 8

## 2024-05-07 NOTE — PATIENT INSTRUCTIONS
S/p total colectomy for treatment of UC- you are having pain in the rectal stump and I would recommend using hydrocortisone suppositories to help with this. If you do not have enough relief then I may recommend using a valium suppository as well.    Generalized abd pain- I would recommend getting a urine culture as well as an us of your kidneys. I am suspicous you also have Fibromyalgia and I will refer you to Rheumatology for work up of this as well.    I will see you back for follow up prior to July 24, 2024

## 2024-05-07 NOTE — PROGRESS NOTES
Subjective   Patient ID: Maureen See is a 53 y.o. female.    HPI  53-year-old female for follow-up status post total colectomy for UC  She did have a fall last week and fell on her abdomen and was evaluated in the emergency room after.  Labs reviewed 5/1/2024 normal magnesium, zinc, CRP and CBC and LFTs  Urine did show hyaline casts  4/29/2024 pelvic ultrasound showed fibroids versus adenomyosis  4/24/2024 CT of the abdomen and pelvis showed a liver at 19 cm with steatosis, status post total colectomy with right sided ostomy, thickened urinary bladder    Having nausea and generalized pain  Liquid stool in ostomy  Back and abd hurting  Rectal stump hurting  Not getting a J pouch  Keeping ostomy  Saw the Ostomy nurse- parastomal hernia  Was using suppository- hydrocortisone- helped some    Objective   Physical Exam  Cardiovascular:      Rate and Rhythm: Normal rate and regular rhythm.      Pulses: Normal pulses.      Heart sounds: Normal heart sounds.   Pulmonary:      Effort: Pulmonary effort is normal.      Breath sounds: Normal breath sounds.   Abdominal:      General: Bowel sounds are normal.      Palpations: Abdomen is soft.         Assessment/Plan     S/p total colectomy for treatment of UC- you are having pain in the rectal stump and I would recommend using hydrocortisone suppositories to help with this. If you do not have enough relief then I may recommend using a valium suppository as well.    Generalized abd pain- I would recommend getting a urine culture as well as an us of your kidneys. I am suspicous you also have Fibromyalgia and I will refer you to Rheumatology for work up of this as well.    I will see you back for follow up prior to July 24, 2024

## 2024-05-09 ENCOUNTER — PATIENT MESSAGE (OUTPATIENT)
Dept: SURGERY | Facility: CLINIC | Age: 54
End: 2024-05-09
Payer: COMMERCIAL

## 2024-05-09 ENCOUNTER — APPOINTMENT (OUTPATIENT)
Dept: RADIOLOGY | Facility: CLINIC | Age: 54
End: 2024-05-09
Payer: COMMERCIAL

## 2024-05-09 DIAGNOSIS — K51.90 ULCERATIVE COLITIS WITHOUT COMPLICATIONS, UNSPECIFIED LOCATION (MULTI): ICD-10-CM

## 2024-05-09 LAB — BACTERIA UR CULT: NO GROWTH

## 2024-05-09 NOTE — TELEPHONE ENCOUNTER
From: Maureen See  To: Shantel De Leon  Sent: 5/9/2024 1:04 PM EDT  Subject: Rectal stump    Hello. Besides my abdominal pain and pain under ostomy my rectal stump area is in a lot of pain. I still have hydrocortisone suppository just a few and it only does so much. I have not had any mucous come out in over 2 1/2 months and am having constant urgency to go sit on toilet and so much pain sitting. How soon would I be able to have the surgery to have the stump removed. It’s just so much pain and discomfort anymore. Please let me know.

## 2024-05-10 ENCOUNTER — PATIENT MESSAGE (OUTPATIENT)
Dept: SURGERY | Facility: CLINIC | Age: 54
End: 2024-05-10
Payer: COMMERCIAL

## 2024-05-13 ENCOUNTER — APPOINTMENT (OUTPATIENT)
Dept: RADIOLOGY | Facility: CLINIC | Age: 54
End: 2024-05-13
Payer: COMMERCIAL

## 2024-05-13 NOTE — TELEPHONE ENCOUNTER
From: Maureen See  To: Shantel De Leon  Sent: 5/10/2024 3:38 PM EDT  Subject: Leave    Hello. I wanted to let you know I’ve been on medical leave of absence between the abdominal pain, rectal stump pain and body worn down. I let my HR person know I am having my rectal stump surgery and they need your phone number and fax number to send paperwork for my surgery just like they had done last year. Could you send that to me so I may  Provide to them? Thank you. And thank you for scheduling surgery. I cannot even sit comfortably anymore and am just resting on my recliner couch.

## 2024-05-15 ENCOUNTER — APPOINTMENT (OUTPATIENT)
Dept: SURGERY | Facility: CLINIC | Age: 54
End: 2024-05-15
Payer: COMMERCIAL

## 2024-05-16 ENCOUNTER — APPOINTMENT (OUTPATIENT)
Dept: DERMATOLOGY | Facility: CLINIC | Age: 54
End: 2024-05-16
Payer: COMMERCIAL

## 2024-05-16 ENCOUNTER — APPOINTMENT (OUTPATIENT)
Dept: RADIOLOGY | Facility: CLINIC | Age: 54
End: 2024-05-16
Payer: COMMERCIAL

## 2024-05-21 ENCOUNTER — APPOINTMENT (OUTPATIENT)
Dept: RADIOLOGY | Facility: CLINIC | Age: 54
End: 2024-05-21
Payer: COMMERCIAL

## 2024-05-24 ENCOUNTER — PATIENT MESSAGE (OUTPATIENT)
Dept: SURGERY | Facility: CLINIC | Age: 54
End: 2024-05-24
Payer: COMMERCIAL

## 2024-05-24 ENCOUNTER — APPOINTMENT (OUTPATIENT)
Dept: RADIOLOGY | Facility: CLINIC | Age: 54
End: 2024-05-24
Payer: COMMERCIAL

## 2024-05-28 ENCOUNTER — PATIENT MESSAGE (OUTPATIENT)
Dept: SURGERY | Facility: CLINIC | Age: 54
End: 2024-05-28
Payer: COMMERCIAL

## 2024-05-28 NOTE — TELEPHONE ENCOUNTER
From: Maureen See  To: Shantel De Leon  Sent: 5/28/2024 11:01 AM EDT  Subject: Appointment    Hi I still haven’t gotten a phone call to set up the presurgery checkup appointment that I’m supposed to do at [facility]. I’m hoping they will be calling me. The only phone number they should be calling is my [phone] which is what they called for my last surgery (total colectomy) is there a phone number for them that I can call to schedule?

## 2024-05-29 ENCOUNTER — APPOINTMENT (OUTPATIENT)
Dept: PRIMARY CARE | Facility: CLINIC | Age: 54
End: 2024-05-29
Payer: COMMERCIAL

## 2024-05-29 RX ORDER — BUPROPION HYDROCHLORIDE 150 MG/1
150 TABLET ORAL
COMMUNITY
Start: 2024-05-13 | End: 2024-05-30 | Stop reason: WASHOUT

## 2024-05-30 ENCOUNTER — APPOINTMENT (OUTPATIENT)
Dept: RHEUMATOLOGY | Facility: CLINIC | Age: 54
End: 2024-05-30
Payer: COMMERCIAL

## 2024-05-30 ENCOUNTER — OFFICE VISIT (OUTPATIENT)
Dept: GASTROENTEROLOGY | Facility: CLINIC | Age: 54
End: 2024-05-30
Payer: COMMERCIAL

## 2024-05-30 VITALS
BODY MASS INDEX: 33.23 KG/M2 | HEART RATE: 85 BPM | TEMPERATURE: 98.2 F | WEIGHT: 176 LBS | DIASTOLIC BLOOD PRESSURE: 87 MMHG | SYSTOLIC BLOOD PRESSURE: 141 MMHG | HEIGHT: 61 IN

## 2024-05-30 DIAGNOSIS — K51.00 ULCERATIVE PANCOLITIS WITHOUT COMPLICATION (MULTI): Primary | ICD-10-CM

## 2024-05-30 PROCEDURE — 99214 OFFICE O/P EST MOD 30 MIN: CPT | Performed by: NURSE PRACTITIONER

## 2024-05-30 PROCEDURE — 1036F TOBACCO NON-USER: CPT | Performed by: NURSE PRACTITIONER

## 2024-05-30 RX ORDER — PREDNISONE 5 MG/1
TABLET ORAL DAILY
Qty: 70 TABLET | Refills: 0 | Status: SHIPPED | OUTPATIENT
Start: 2024-05-30 | End: 2024-06-11 | Stop reason: ALTCHOICE

## 2024-05-30 ASSESSMENT — PAIN SCALES - GENERAL: PAINLEVEL: 8

## 2024-05-30 NOTE — PROGRESS NOTES
Subjective   Patient ID: Maureen See is a 53 y.o. female.    HPI  53-year-old female for follow-up of abdominal pain  Status post total colectomy for treatment of ulcerative colitis  She is scheduled for surgery to have her rectal stump removed on July 2, 2024 and will continue with a permanent ostomy.  Has made appt with Rheumatology  Job requires her to be seen every 2=3 weeks  Using dicyclomine but not much help  Using balion cream   Unable to use the suppository   Eating some- soft  Discussed using prednisone    Objective   Physical Exam  Constitutional:       Appearance: Normal appearance.   Cardiovascular:      Rate and Rhythm: Normal rate and regular rhythm.      Pulses: Normal pulses.      Heart sounds: Normal heart sounds.   Pulmonary:      Effort: Pulmonary effort is normal.      Breath sounds: Normal breath sounds.   Abdominal:      General: Bowel sounds are normal.      Palpations: Abdomen is soft.   Musculoskeletal:      Cervical back: Normal range of motion.   Neurological:      Mental Status: She is alert.         Assessment/Plan     Abdominal pain- you are scheduled for surgery July 2, 2024 for the rectal stump removal; I will order a prednisone taper to help with the inflammation in the rectal tissue as well and you can use dicyclomine 20 mg 4 times daily to help as well.     Please follow through with the Rheumatology appt as well to be worked up for fibromyalgia.     I will see you back for follow up in 3 weeks

## 2024-05-30 NOTE — PATIENT INSTRUCTIONS
Abdominal pain- you are scheduled for surgery July 2, 2024 for the rectal stump removal; I will order a prednisone taper to help with the inflammation in the rectal tissue as well and you can use dicyclomine 20 mg 4 times daily to help as well.     Please follow through with the Rheumatology appt as well to be worked up for fibromyalgia.     I will see you back for follow up in 3 weeks

## 2024-06-03 ENCOUNTER — APPOINTMENT (OUTPATIENT)
Dept: RHEUMATOLOGY | Facility: CLINIC | Age: 54
End: 2024-06-03
Payer: COMMERCIAL

## 2024-06-06 NOTE — PROGRESS NOTES
History of Present Illness  Maureen See is a 53-year-old female with a long history of ulcerative colitis. She has tried multiple medications and has been requiring steroids and presented for surgery.     05/08/2023 OPERATION/PROCEDURE: Single-incision laparoscopic total abdominal colectomy with end ileostomy.  A. COLON, TOTAL COLECTOMY:  --SEGMENT OF COLON WITH MILD ARCHITECTURAL DISTORTION, PANETH CELL METAPLASIA AND MILD BASAL LYMPHOPLASMACYTIC INFLAMMATION, SEE NOTE  --FIBROUS OBLITERATION OF THE APPENDIX  --22 BENIGN LYMPH NODES WITH NO SIGNIFICANT DIAGNOSTIC CHANGE  Note: Colectomy specimen shows mild architectural distortion with Paneth cell metaplasia present in left colon with basal lymphoplasmacytic inflammation, suggestive of chronic mucosal injury. Active inflammation, ulceration,dysplasia, granuloma or malignancy are not identified.     1/18/2024 Sigmoidoscopy  Findings  Generalized edematous mucosa with loss of vascular pattern; no bleeding was identified; performed cold forceps biopsy for dysplasia screening. Mucosa was relatively normal - no bleeding on insertion - made it to the staple line.  Formed mucous in the rectal vault.  6 biopsies taken for dysplasia screening no lesions noted     She has been seeing Jessy Mo NP.  She c/o rectal stump is causing her pain.  She has been using hydrocortisone suppositories.    She is ready to move forward and have the rectum removed.  She is scheduled for June 25 2024 Robotic Completion Proctectomy .       Review of Systems  Constitutional: Negative for fever, chills, anorexia, weight loss, malaise                  ENMT: Negative for nasal discharge, congestion, ear pain, mouth pain, throat pain           Respiratory: Negative for cough, hemoptysis, wheezing, shortness of breath (+) ASTHMA                Cardiac: Negative for chest pain, dyspnea on exertion, orthopnea, palpitations, syncope, (+)HLD,      Gastrointestinal: Negative for nausea, vomiting,  diarrhea, constipation, abdominal pain, (+)ULCERATIVE COLITIS, S/P TAC WITH END ILEOSTOMY MAY 2023     Genitourinary: Negative for discharge, dysuria, flank pain, frequency, hematuria                   Musculoskeletal: Negative for decreased ROM, pain, swelling, weakness              Neurological: Negative for dizziness, confusion, headache, seizures, syncope       Psychiatric: Negative for mood changes, anxiety, hallucinations, sleep changes, suicidal ideas (+) ANXIETY & DEPRESSION              Skin: Negative for mass, pain, itching, rash, ulcer , (+) PSORIASIS      Endocrine: Negative for heat intolerance, cold intolerance, excessive sweating, polyuria, excess thirst            Hematologic/Lymph: Negative for anemia, bruising, easy bleeding, night sweats, petechiae, history of DVT/PE or cancer              Allergic/Immunologic: Negative for anaphylaxis, itchy/ teary eyes, itching, sneezing, swelling     Physical Exam  Constitutional: Well developed, awake/alert/oriented x3, no distress, alert and cooperative             Eyes: Sclera anicteric, no conjunctival inflammation, conjugate gaze    ENMT: mucous membranes moist, no apparent injury,            Head/Neck: Neck supple, no apparent injury, No JVD, trachea midline, no bruits              Respiratory/Thorax: Patent airways, CTAB, normal breath sounds with good chest expansion, thorax symmetric         Cardiovascular: Regular, rate and rhythm, no murmurs, normal S1 and S2         Gastrointestinal: Nondistended, soft, non-tender, no rebound tenderness or guarding, no masses palpable, no organomegaly, +BS, stoma pink and well everted with soft brown stool           Extremities: normal extremities, no cyanosis edema, contusions or wounds, 2+ femoral pulses B/L              Neurological: alert and oriented x3, normal strength, Normal gait          Lymphatic: No palpable inguinal lymphadenopathy   Psychological: Appropriate mood and behavior         Skin: Warm and  dry, no lesions, no rashes                    Impression:  s/p TAC with EI for UC - doing great does not want to pursue J pouch    Plan:  Robotic Completion Proctectomy  Discussed the risks of leakage and need for a permanent stoma, infection, bleeding, injury to other organs, UTI, wound infection, blood clots, hernia, need for further operation, need for blood products and anesthetic complications. Urinary retention.  Nerve dysfunction.

## 2024-06-06 NOTE — H&P (VIEW-ONLY)
History of Present Illness  Maureen See is a 53-year-old female with a long history of ulcerative colitis. She has tried multiple medications and has been requiring steroids and presented for surgery.     05/08/2023 OPERATION/PROCEDURE: Single-incision laparoscopic total abdominal colectomy with end ileostomy.  A. COLON, TOTAL COLECTOMY:  --SEGMENT OF COLON WITH MILD ARCHITECTURAL DISTORTION, PANETH CELL METAPLASIA AND MILD BASAL LYMPHOPLASMACYTIC INFLAMMATION, SEE NOTE  --FIBROUS OBLITERATION OF THE APPENDIX  --22 BENIGN LYMPH NODES WITH NO SIGNIFICANT DIAGNOSTIC CHANGE  Note: Colectomy specimen shows mild architectural distortion with Paneth cell metaplasia present in left colon with basal lymphoplasmacytic inflammation, suggestive of chronic mucosal injury. Active inflammation, ulceration,dysplasia, granuloma or malignancy are not identified.     1/18/2024 Sigmoidoscopy  Findings  Generalized edematous mucosa with loss of vascular pattern; no bleeding was identified; performed cold forceps biopsy for dysplasia screening. Mucosa was relatively normal - no bleeding on insertion - made it to the staple line.  Formed mucous in the rectal vault.  6 biopsies taken for dysplasia screening no lesions noted     She has been seeing Jessy oM NP.  She c/o rectal stump is causing her pain.  She has been using hydrocortisone suppositories.    She is ready to move forward and have the rectum removed.  She is scheduled for June 25 2024 Robotic Completion Proctectomy .       Review of Systems  Constitutional: Negative for fever, chills, anorexia, weight loss, malaise                  ENMT: Negative for nasal discharge, congestion, ear pain, mouth pain, throat pain           Respiratory: Negative for cough, hemoptysis, wheezing, shortness of breath (+) ASTHMA                Cardiac: Negative for chest pain, dyspnea on exertion, orthopnea, palpitations, syncope, (+)HLD,      Gastrointestinal: Negative for nausea, vomiting,  diarrhea, constipation, abdominal pain, (+)ULCERATIVE COLITIS, S/P TAC WITH END ILEOSTOMY MAY 2023     Genitourinary: Negative for discharge, dysuria, flank pain, frequency, hematuria                   Musculoskeletal: Negative for decreased ROM, pain, swelling, weakness              Neurological: Negative for dizziness, confusion, headache, seizures, syncope       Psychiatric: Negative for mood changes, anxiety, hallucinations, sleep changes, suicidal ideas (+) ANXIETY & DEPRESSION              Skin: Negative for mass, pain, itching, rash, ulcer , (+) PSORIASIS      Endocrine: Negative for heat intolerance, cold intolerance, excessive sweating, polyuria, excess thirst            Hematologic/Lymph: Negative for anemia, bruising, easy bleeding, night sweats, petechiae, history of DVT/PE or cancer              Allergic/Immunologic: Negative for anaphylaxis, itchy/ teary eyes, itching, sneezing, swelling     Physical Exam  Constitutional: Well developed, awake/alert/oriented x3, no distress, alert and cooperative             Eyes: Sclera anicteric, no conjunctival inflammation, conjugate gaze    ENMT: mucous membranes moist, no apparent injury,            Head/Neck: Neck supple, no apparent injury, No JVD, trachea midline, no bruits              Respiratory/Thorax: Patent airways, CTAB, normal breath sounds with good chest expansion, thorax symmetric         Cardiovascular: Regular, rate and rhythm, no murmurs, normal S1 and S2         Gastrointestinal: Nondistended, soft, non-tender, no rebound tenderness or guarding, no masses palpable, no organomegaly, +BS, stoma pink and well everted with soft brown stool           Extremities: normal extremities, no cyanosis edema, contusions or wounds, 2+ femoral pulses B/L              Neurological: alert and oriented x3, normal strength, Normal gait          Lymphatic: No palpable inguinal lymphadenopathy   Psychological: Appropriate mood and behavior         Skin: Warm and  dry, no lesions, no rashes                    Impression:  s/p TAC with EI for UC - doing great does not want to pursue J pouch    Plan:  Robotic Completion Proctectomy  Discussed the risks of leakage and need for a permanent stoma, infection, bleeding, injury to other organs, UTI, wound infection, blood clots, hernia, need for further operation, need for blood products and anesthetic complications. Urinary retention.  Nerve dysfunction.

## 2024-06-11 ENCOUNTER — CLINICAL SUPPORT (OUTPATIENT)
Dept: PREADMISSION TESTING | Facility: HOSPITAL | Age: 54
End: 2024-06-11
Payer: COMMERCIAL

## 2024-06-11 DIAGNOSIS — K51.90 ULCERATIVE COLITIS WITHOUT COMPLICATIONS, UNSPECIFIED LOCATION (MULTI): ICD-10-CM

## 2024-06-13 ENCOUNTER — PATIENT MESSAGE (OUTPATIENT)
Dept: SURGERY | Facility: CLINIC | Age: 54
End: 2024-06-13
Payer: COMMERCIAL

## 2024-06-13 ENCOUNTER — APPOINTMENT (OUTPATIENT)
Dept: GASTROENTEROLOGY | Facility: CLINIC | Age: 54
End: 2024-06-13
Payer: COMMERCIAL

## 2024-06-17 ENCOUNTER — PATIENT MESSAGE (OUTPATIENT)
Dept: SURGERY | Facility: CLINIC | Age: 54
End: 2024-06-17
Payer: COMMERCIAL

## 2024-06-17 ENCOUNTER — APPOINTMENT (OUTPATIENT)
Dept: UROLOGY | Facility: HOSPITAL | Age: 54
End: 2024-06-17
Payer: COMMERCIAL

## 2024-06-17 DIAGNOSIS — K51.90 ULCERATIVE COLITIS WITHOUT COMPLICATIONS (MULTI): Primary | ICD-10-CM

## 2024-06-17 DIAGNOSIS — K51.00 ULCERATIVE PANCOLITIS WITHOUT COMPLICATION (MULTI): ICD-10-CM

## 2024-06-17 RX ORDER — METRONIDAZOLE 500 MG/100ML
500 INJECTION, SOLUTION INTRAVENOUS ONCE
Status: CANCELLED | OUTPATIENT
Start: 2024-06-17 | End: 2024-06-17

## 2024-06-17 RX ORDER — HEPARIN SODIUM 5000 [USP'U]/ML
5000 INJECTION, SOLUTION INTRAVENOUS; SUBCUTANEOUS ONCE
Status: CANCELLED | OUTPATIENT
Start: 2024-06-17 | End: 2024-06-17

## 2024-06-17 RX ORDER — GABAPENTIN 100 MG/1
CAPSULE ORAL
Qty: 3 CAPSULE | Refills: 0 | Status: ON HOLD | OUTPATIENT
Start: 2024-06-17 | End: 2024-06-25 | Stop reason: ALTCHOICE

## 2024-06-17 RX ORDER — CHLORHEXIDINE GLUCONATE ORAL RINSE 1.2 MG/ML
SOLUTION DENTAL
Qty: 120 ML | Refills: 0 | Status: ON HOLD | OUTPATIENT
Start: 2024-06-17 | End: 2024-06-25 | Stop reason: SINTOL

## 2024-06-17 RX ORDER — LEVOFLOXACIN 5 MG/ML
500 INJECTION, SOLUTION INTRAVENOUS ONCE
Status: CANCELLED | OUTPATIENT
Start: 2024-06-17 | End: 2024-06-17

## 2024-06-17 RX ORDER — SODIUM CHLORIDE 9 MG/ML
10 INJECTION, SOLUTION INTRAVENOUS CONTINUOUS
Status: CANCELLED | OUTPATIENT
Start: 2024-06-17

## 2024-06-17 NOTE — TELEPHONE ENCOUNTER
From: Maureen See  To: Shantel De Leon  Sent: 6/13/2024 7:51 PM EDT  Subject: Call    Patricia I apologize. I’ve been sleeping alot during the day now from the pain in my rectal stump  And just overall fatigue. By the time I was able  To call office was closed. I had my phone on silent. I will wait for your call tomorrow.

## 2024-06-17 NOTE — TELEPHONE ENCOUNTER
From: Maureen See  To: Shantel De Leon  Sent: 6/17/2024 2:30 PM EDT  Subject: Surgery date    Hi Patricia I think Jonah at your  said it was changed to June 25th and I told her that was ok. I will be ready. In so much pain all I do is sleep. I’ve been on medical leave of absence last month and half. So  Yes for next Tuesday June 25th. Will see you and Dr De Leon on Wednesday this week.

## 2024-06-18 ENCOUNTER — PRE-ADMISSION TESTING (OUTPATIENT)
Dept: PREADMISSION TESTING | Facility: HOSPITAL | Age: 54
End: 2024-06-18
Payer: COMMERCIAL

## 2024-06-18 ENCOUNTER — LAB (OUTPATIENT)
Dept: LAB | Facility: LAB | Age: 54
End: 2024-06-18
Payer: COMMERCIAL

## 2024-06-18 ENCOUNTER — APPOINTMENT (OUTPATIENT)
Dept: OBSTETRICS AND GYNECOLOGY | Facility: CLINIC | Age: 54
End: 2024-06-18
Payer: COMMERCIAL

## 2024-06-18 VITALS
RESPIRATION RATE: 16 BRPM | TEMPERATURE: 97.3 F | SYSTOLIC BLOOD PRESSURE: 145 MMHG | HEART RATE: 85 BPM | OXYGEN SATURATION: 98 % | HEIGHT: 63 IN | DIASTOLIC BLOOD PRESSURE: 89 MMHG | BODY MASS INDEX: 32.27 KG/M2 | WEIGHT: 182.1 LBS

## 2024-06-18 DIAGNOSIS — Z01.818 PRE-OP TESTING: ICD-10-CM

## 2024-06-18 DIAGNOSIS — Z01.818 PRE-OP TESTING: Primary | ICD-10-CM

## 2024-06-18 LAB
ALBUMIN SERPL BCP-MCNC: 4.3 G/DL (ref 3.4–5)
ALP SERPL-CCNC: 123 U/L (ref 33–110)
ALT SERPL W P-5'-P-CCNC: 53 U/L (ref 7–45)
ANION GAP SERPL CALC-SCNC: 13 MMOL/L (ref 10–20)
AST SERPL W P-5'-P-CCNC: 41 U/L (ref 9–39)
ATRIAL RATE: 81 BPM
BASOPHILS # BLD AUTO: 0.05 X10*3/UL (ref 0–0.1)
BASOPHILS NFR BLD AUTO: 0.6 %
BILIRUB SERPL-MCNC: 0.3 MG/DL (ref 0–1.2)
BUN SERPL-MCNC: 16 MG/DL (ref 6–23)
CALCIUM SERPL-MCNC: 9.4 MG/DL (ref 8.6–10.3)
CHLORIDE SERPL-SCNC: 104 MMOL/L (ref 98–107)
CO2 SERPL-SCNC: 27 MMOL/L (ref 21–32)
CREAT SERPL-MCNC: 0.75 MG/DL (ref 0.5–1.05)
EGFRCR SERPLBLD CKD-EPI 2021: >90 ML/MIN/1.73M*2
EOSINOPHIL # BLD AUTO: 0.26 X10*3/UL (ref 0–0.7)
EOSINOPHIL NFR BLD AUTO: 3 %
ERYTHROCYTE [DISTWIDTH] IN BLOOD BY AUTOMATED COUNT: 13.6 % (ref 11.5–14.5)
GLUCOSE SERPL-MCNC: 121 MG/DL (ref 74–99)
HCT VFR BLD AUTO: 39 % (ref 36–46)
HGB BLD-MCNC: 12.7 G/DL (ref 12–16)
IMM GRANULOCYTES # BLD AUTO: 0.06 X10*3/UL (ref 0–0.7)
IMM GRANULOCYTES NFR BLD AUTO: 0.7 % (ref 0–0.9)
LYMPHOCYTES # BLD AUTO: 2.08 X10*3/UL (ref 1.2–4.8)
LYMPHOCYTES NFR BLD AUTO: 24.3 %
MCH RBC QN AUTO: 30 PG (ref 26–34)
MCHC RBC AUTO-ENTMCNC: 32.6 G/DL (ref 32–36)
MCV RBC AUTO: 92 FL (ref 80–100)
MONOCYTES # BLD AUTO: 0.65 X10*3/UL (ref 0.1–1)
MONOCYTES NFR BLD AUTO: 7.6 %
NEUTROPHILS # BLD AUTO: 5.46 X10*3/UL (ref 1.2–7.7)
NEUTROPHILS NFR BLD AUTO: 63.8 %
NRBC BLD-RTO: 0 /100 WBCS (ref 0–0)
P AXIS: 70 DEGREES
P OFFSET: 205 MS
P ONSET: 148 MS
PLATELET # BLD AUTO: 247 X10*3/UL (ref 150–450)
POTASSIUM SERPL-SCNC: 3.6 MMOL/L (ref 3.5–5.3)
PR INTERVAL: 142 MS
PROT SERPL-MCNC: 6.8 G/DL (ref 6.4–8.2)
Q ONSET: 219 MS
QRS COUNT: 13 BEATS
QRS DURATION: 82 MS
QT INTERVAL: 416 MS
QTC CALCULATION(BAZETT): 483 MS
QTC FREDERICIA: 459 MS
R AXIS: 35 DEGREES
RBC # BLD AUTO: 4.23 X10*6/UL (ref 4–5.2)
SODIUM SERPL-SCNC: 140 MMOL/L (ref 136–145)
T AXIS: 44 DEGREES
T OFFSET: 427 MS
VENTRICULAR RATE: 81 BPM
WBC # BLD AUTO: 8.6 X10*3/UL (ref 4.4–11.3)

## 2024-06-18 PROCEDURE — 36415 COLL VENOUS BLD VENIPUNCTURE: CPT

## 2024-06-18 PROCEDURE — 93005 ELECTROCARDIOGRAM TRACING: CPT | Performed by: NURSE PRACTITIONER

## 2024-06-18 PROCEDURE — 80053 COMPREHEN METABOLIC PANEL: CPT

## 2024-06-18 PROCEDURE — 85025 COMPLETE CBC W/AUTO DIFF WBC: CPT

## 2024-06-18 PROCEDURE — 87081 CULTURE SCREEN ONLY: CPT | Mod: AHULAB | Performed by: NURSE PRACTITIONER

## 2024-06-18 ASSESSMENT — ENCOUNTER SYMPTOMS
BRUISES/BLEEDS EASILY: 0
ARTHRALGIAS: 1
SKIN CHANGES: 1
ABDOMINAL PAIN: 1
NECK NEGATIVE: 1
RESPIRATORY NEGATIVE: 1
MYALGIAS: 1
CONSTITUTIONAL NEGATIVE: 1
NEUROLOGICAL NEGATIVE: 1
PALPITATIONS: 0
DYSPNEA AT REST: 0

## 2024-06-18 NOTE — CPM/PAT H&P
[facility]/PAT Evaluation       Name: Maureen See (Maureen See)  /Age: 1970/53 y.o.         Date of Consult: 24     Referring Provider: Dr. De Leon    Surgery, Date, and Length: Robot Assisted Completion Proctectomy, 24, 210 min.    Maureen See is a 53 year-old female who presents to the [facility] for perioperative risk assessment prior to surgery.    Patient presents with a primary diagnosis of Ulcerative colitis.   Status post total colectomy for treatment of ulcerative colitis  She is scheduled for surgery to have her rectal stump removed on 2024 and will continue with a permanent ostomy.    This note was created in part upon personal review of patient's medical records.      Patient is scheduled to have a Robot Assisted Completion Proctectomy.    Pt denies any past history of anesthetic complications such as PONV, awareness, prolonged sedation, dental damage, aspiration, cardiac arrest, difficult intubation, difficult I.V. access or unexpected hospital admissions.  NO malignant hyperthermia and or pseudocholinesterase deficiency.  No history of blood transfusions     The patient is not a Zoroastrianism and will accept blood and blood products if medically indicated.       Past Medical History:   Diagnosis Date    Anemia     Anxiety     Asthma (HHS-HCC)     Depression     Familial hypercholesterolemia     Fatty liver     GERD (gastroesophageal reflux disease)     Ileostomy in place (Multi) 2023    Inflammatory bowel disease     Iron deficiency anemia     Obesity     Prediabetes     2023 A1C 5.5%    Ulcerative colitis (Multi)     Vitamin D deficiency        Past Surgical History:   Procedure Laterality Date     SECTION, LOW TRANSVERSE  2000    10/14/1992 & 2000    FLEXIBLE SIGMOIDOSCOPY  2024    TOTAL COLECTOMY  2023 OPERATION/PROCEDURE: Single-incision laparoscopic total abdominal colectomy with end ileostomy    WISDOM TOOTH EXTRACTION          Family History   Problem Relation Name Age of Onset    Arthritis Mother Pam Cruz     Hypertension Mother Pam Cruz     Heart disease Father Jordan Cruz     Hypertension Father Jordan Cruz     Colon polyps Father Jordan Cruz     Cancer Maternal Grandmother Morena cid     Diabetes Maternal Grandmother Morena cid     Colon cancer Maternal Grandmother Morena cid     Cancer Paternal Grandfather Tyrell cruz     Cancer Father's Brother Gianfranco Cruz     Diabetes Son Adam Noar     Diabetes Brother Raleigh Cruz     Hypertension Brother Raleigh Cruz      Social History     Socioeconomic History    Marital status:      Spouse name: Not on file    Number of children: Not on file    Years of education: Not on file    Highest education level: Not on file   Occupational History    Not on file   Tobacco Use    Smoking status: Former     Current packs/day: 0.00     Average packs/day: 0.5 packs/day for 31.4 years (15.7 ttl pk-yrs)     Types: Cigarettes     Start date: 1984     Quit date: 2015     Years since quittin.7    Smokeless tobacco: Never   Vaping Use    Vaping status: Never Used   Substance and Sexual Activity    Alcohol use: Not Currently     Alcohol/week: 1.0 standard drink of alcohol     Types: 1 Glasses of wine per week    Drug use: Never    Sexual activity: Yes     Partners: Male     Birth control/protection: Other     Comment: Birth control pills   Other Topics Concern    Not on file   Social History Narrative    Not on file     Social Determinants of Health     Financial Resource Strain: Not on file   Food Insecurity: Not on file   Transportation Needs: Not on file   Physical Activity: Not on file   Stress: Not on file   Social Connections: Not on file   Intimate Partner Violence: Not on file   Housing Stability: Not on file        Allergies   Allergen Reactions    Amoxicillin Hives and Rash    Aspirin GI Upset and Nausea/vomiting    Azithromycin Rash and Nausea/vomiting     Clindamycin Rash    Sulfamethoxazole Hives         Current Outpatient Medications:     escitalopram (Lexapro) 20 mg tablet, Take 1 tablet (20 mg) by mouth once daily at bedtime., Disp: , Rfl:     esomeprazole (NexIUM) 40 mg DR capsule, Take 1 capsule (40 mg) by mouth 2 times a day. Do not open capsule. (Patient taking differently: Take 1 capsule (40 mg) by mouth once daily in the morning. Take before meals. Per patient she opens capsule and mixes with applesauce. Takes 30 minutes before breakfast.), Disp: 180 capsule, Rfl: 3    norethindrone (Micronor) 0.35 mg tablet, TAKE 1 TABLET BY MOUTH EVERY DAY (Patient taking differently: Take 1 tablet (0.35 mg) by mouth once daily at bedtime.), Disp: 84 tablet, Rfl: 12    rosuvastatin (Crestor) 20 mg tablet, Take 1 tablet (20 mg) by mouth once daily. (Patient taking differently: Take 1 tablet (20 mg) by mouth once daily. Takes in the evening), Disp: 90 tablet, Rfl: 3    chlorhexidine (Peridex) 0.12 % solution, Rinse mouth with 15 ml after toothbrushing the night before surgery and on the morning of surgery.  Expectorate after rinsing.  Do not swallow., Disp: 120 mL, Rfl: 0    dicyclomine (Bentyl) 10 mg capsule, Take 1 capsule (10 mg) by mouth 4 times a day before meals. Take with meals and at bedtime (Patient taking differently: Take 1 capsule (10 mg) by mouth 4 times a day before meals. Take with meals and at bedtime- As needed), Disp: 120 capsule, Rfl: 11    gabapentin (Neurontin) 100 mg capsule, Take one capsule by mouth starting three days before surgery at bedtime., Disp: 3 capsule, Rfl: 0      PAT ROS:   Constitutional:   neg    Neuro/Psych:   neg    Eyes:    use of corrective lenses  Ears:    no hearing aides  Nose:   Mouth:    Missing teeth  Throat:   neg    Neck:   neg    Cardio:    Functional 4 Mets. Patient denies SOB walking up 2 flights of stairs    no chest pain   no palpitations   no dyspnea  Respiratory:   neg    Endocrine:   GI:    Rectal pain, constant  "pressure.   abdominal pain  :   neg    Musculoskeletal:    arthralgias   myalgias  Hematologic:    does not bruise/bleed easily   no history of blood transfusion   no blood clots  Skin:   skin changes (Eczema rash bilateral arms)      Physical Exam  Physical exam within normal limits.   Abdominal:      Comments: Ostomy bag present.          PAT AIRWAY:   Airway:     Mallampati::  II    Neck ROM::  Full   No broken teeth, no dentures and missing teeth            Visit Vitals  /89   Pulse 85   Temp 36.3 °C (97.3 °F) (Tympanic)   Resp 16   Ht 1.59 m (5' 2.6\")   Wt 82.6 kg (182 lb 1.6 oz)   SpO2 98%   BMI 32.67 kg/m²   Smoking Status Former   BSA 1.91 m²      Plan    Cardiovascular:  Patient denies any chest pain, tightness, heaviness, pressure, radiating pain, palpitations, irregular heartbeats, lightheadedness, cough, congestion, shortness of breath, EPPERSON, PND, near syncope, weight loss or gain.    HLD: Crestor-patient to take dos    EKG in PAT on 06/18/24  Encounter Date: 06/18/24   ECG 12 Lead   Result Value    Ventricular Rate 81    Atrial Rate 81    NH Interval 142    QRS Duration 82    QT Interval 416    QTC Calculation(Bazett) 483    P Axis 70    R Axis 35    T Axis 44    QRS Count 13    Q Onset 219    P Onset 148    P Offset 205    T Offset 427    QTC Fredericia 459    Narrative    Normal sinus rhythm  Prolonged QT  Abnormal ECG  When compared with ECG of 26-APR-2023 11:25,  No significant change was found  Confirmed by Dennys Amador ([id number]) on 6/18/2024 3:03:43 PM       RCRI: 1 Risk of Mace: 6.0%    Pulm:  History of Asthma- has not used inhalers in awhile  Denies any shortness of breath or activity intolerance.    Stop Bang= 1 (age) low risk    GI/:  GERD  Ulcerative Colitis  Bentyl-patient to stop one day prior to surgery  Nexium-patient to take dos    Heme:  Anemia  H/H 12.7/39 (06/18/24)    Patient instructed to ambulate as soon as possible postoperatively to decrease thromboembolic " risk.    Initiate mechanical DVT prophylaxis as soon as possible and initiate chemical prophylaxis when deemed safe from a bleeding standpoint post surgery.    Caprini=6    Risk assessment complete.  Patient is scheduled for an intermediate surgical risk procedure.      Labs/testing obtained in PAT on 06/18/24  CBC, CMP, EKG, MRSA    Lab Results   Component Value Date    WBC 8.6 06/18/2024    HGB 12.7 06/18/2024    HCT 39.0 06/18/2024    MCV 92 06/18/2024     06/18/2024     Lab Results   Component Value Date    GLUCOSE 121 (H) 06/18/2024    CALCIUM 9.4 06/18/2024     06/18/2024    K 3.6 06/18/2024    CO2 27 06/18/2024     06/18/2024    BUN 16 06/18/2024    CREATININE 0.75 06/18/2024     Lab Results   Component Value Date    ALT 53 (H) 06/18/2024    AST 41 (H) 06/18/2024    ALKPHOS 123 (H) 06/18/2024    BILITOT 0.3 06/18/2024     Labs reviewed. AST, ALT and Alk phos slightly elevated, patient has had previous labs with similar results, otherwise labs unremarkable.       Follow up: MRSA    Preoperative medication instructions were provided and reviewed with the patient.  Any additional testing or evaluation was explained to the patient.  Nothing by mouth instructions were discussed and patient's questions were answered prior to conclusion to this encounter.  Patient verbalized understanding of preoperative instructions given in preadmission testing; discharge instructions available in EMR.    This note was dictated with speech recognition.  Minor errors may have been detected during use of speech recognition.

## 2024-06-18 NOTE — PREPROCEDURE INSTRUCTIONS
Medication List            Accurate as of June 18, 2024  1:37 PM. Always use your most recent med list.                chlorhexidine 0.12 % solution  Commonly known as: Peridex  Rinse mouth with 15 ml after toothbrushing the night before surgery and on the morning of surgery.  Expectorate after rinsing.  Do not swallow.     dicyclomine 10 mg capsule  Commonly known as: Bentyl  Take 1 capsule (10 mg) by mouth 4 times a day before meals. Take with meals and at bedtime  Medication Adjustments for Surgery: Stop 1 day before surgery     esomeprazole 40 mg DR capsule  Commonly known as: NexIUM  Take 1 capsule (40 mg) by mouth 2 times a day. Do not open capsule.  Medication Adjustments for Surgery: Take morning of surgery with sip of water, no other fluids     gabapentin 100 mg capsule  Commonly known as: Neurontin  Take one capsule by mouth starting three days before surgery at bedtime.  Notes to patient: Take as directed     Lexapro 20 mg tablet  Generic drug: escitalopram  Medication Adjustments for Surgery: Take morning of surgery with sip of water, no other fluids     norethindrone 0.35 mg tablet  Commonly known as: Micronor  TAKE 1 TABLET BY MOUTH EVERY DAY  Medication Adjustments for Surgery: Take morning of surgery with sip of water, no other fluids     rosuvastatin 20 mg tablet  Commonly known as: Crestor  Take 1 tablet (20 mg) by mouth once daily.  Medication Adjustments for Surgery: Take morning of surgery with sip of water, no other fluids                            **Concerning above medication instructions, if medication is normally taken at night, continue normal schedule.**  **DO NOT TAKE NIGHT PRIOR AND MORNING OF SURGERY**    CONTACT SURGEON'S OFFICE IF YOU DEVELOP:  * Fever = 100.4 F   * New respiratory symptoms (e.g. cough, shortness of breath, respiratory distress, sore throat)  * Recent loss of taste or smell  *Flu like symptoms such as headache, fatigue or gastrointestinal symptoms  * You develop  any open sores, shingles, burning or painful urination   AND/OR:  * You no longer wish to have the surgery.  * Any other personal circumstances change that may lead to the need to cancel or defer this surgery.  *You were admitted to any hospital within one week of your planned procedure.    SMOKING:  *Quitting smoking can make a huge difference to your health and recovery from surgery.    *If you need help with quitting, call 1-267-QUIT-NOW.    THE DAY BEFORE SURGERY:   Nothing by mouth (no solids or liquids) after midnight.   If diabetic, please check fasting blood sugar upon waking up.    If fasting sugar is <80 mg/dl, please drink 100ml/3oz of apple juice no later than 2 hours prior to arrival time.      SURGICAL TIME  *You will be contacted between 2 p.m. and 6 p.m. the business day before your surgery with your arrival time.  *If you haven't received a call by 6pm, call [phone].  *Scheduled surgery times may change and you will be notified if this occurs-check your personal voicemail for any updates.    ON THE MORNING OF SURGERY:  *Wear comfortable, loose fitting clothing.   *Do not use moisturizers, creams, lotions or perfume.  *All jewelry and valuables should be left at home.  *Prosthetic devices such as contact lenses, hearing aids, dentures, eyelash extensions, hairpins and body piercing must be removed before surgery.    BRING WITH YOU:  *Photo ID and insurance card  *Current list of medicines and allergies  *Pacemaker/Defibrillator/Heart stent cards  *CPAP machine and mask  *Slings/splints/crutches  *Copy of your complete Advanced Directive/DHPOA-if applicable  *Neurostimulator implant remote    PARKING AND ARRIVAL:  *Check in at the Main Entrance desk and let them know you are here for surgery.  *You will be directed to the 2nd floor surgical waiting area.    AFTER OUTPATIENT SURGERY:  *A responsible adult MUST accompany you at the time of discharge and stay with you for 24 hours after your  surgery.  *You may NOT drive yourself home after surgery.  *You may use a taxi or ride sharing service ([employer], [employer]) to return home ONLY if you are accompanied by a friend or family member.  *Instructions for resuming your medications will be provided by your surgeon.         Patient Information: Oral/Dental Rinse  **This is a prescription; pick it up at your preferred local pharmacy **  What is oral/dental rinse?   It is a mouthwash. It is a way of cleaning the mouth with a germ killing solution before your surgery.  The solution contains chlorhexidine, commonly known as CHG.   It is used inside the mouth to kill a bacteria known as Staphylococcus aureus.  Let your doctor know if you are allergic to Chlorhexidine.    Why do I need to use CHG oral/dental rinse?  The CHG oral/dental rinse helps to kill a bacteria in your mouth known a Staphylococcus aureus.     This reduces the risk of infection at the surgical site.      Using your CHG oral/dental rinse  STEPS:  Use your CHG oral/dental rinse after you brush your teeth the night before (at bedtime) and the morning of your surgery.  Follow all directions on your prescription label.    Use the cap on the container to measure 15ml (fill cap to fill line)  Swish (gargle if you can) the mouthwash in your mouth for at least 30 seconds, (do not to swallow) spit out  After you use your CHG rinse, do not rinse your mouth with water, drink or eat.  Please refer to prescription label for the appropriate time to resume oral intake  Dental rinse comes in one size bottle: 473ml ~16oz.  You will have leftover    rinse, discard after this use.    What side effects might I have using the CHG oral/dental rinse?  CHG rinse will stick to plaque on the teeth.  Brush and floss just before use.  Teeth brushing will help avoid staining of plaque during use.    Who should I contact if I have questions about the CHG oral/dental rinse?  Please call [facility],  Preadmission Testing at [phone] if you have any questions      Home Preoperative Antibacterial Shower     What is a home preoperative antibacterial shower?  This shower is a way of cleaning the skin with a germ killing soap before surgery.  The soap contains chlorhexidine, commonly known as CHG.  CHG is a soap for your skin with germ killing ability.  Let your doctor know if you are allergic to chlorhexidine.    Why do I need to take a preoperative antibacterial shower?  Skin is not sterile.  It is best to try to make your skin as free of germs as possible before surgery.  Proper cleansing with a germ killing soap before surgery can lower the number of germs on your skin.  This helps to reduce the risk of infection at the surgical site.  Following the instructions listed below will help you prepare your skin for surgery.      How do I use the CHG skin cleanser?  Steps:  Begin using your CHG soap five days before your scheduled surgery on ________________________.    Days 1-4 Shower before bed:  Wash your face and genitals with your normal soap and rinse.    Wash and rinse your hair using the CHG soap. Rinse completely, do not condition your   Hair.          3.    Apply the CHG soap to a clean wet washcloth.  Turn the water off or move away                From the water spray to avoid premature rinsing of the CHG soap as you are applying.     4.   Lather your entire body from the neck down.  Do not use on your face or genitals.   Pay special attention to the area(s) where your incision(s) will be located unless they are on your face.  Avoid scrubbing your skin too hard.  The important point is to have the CHG soap sit on your skin for 3 minutes.    When the 3 minutes are up, turn on the water and rinse the CHG soap off your body completely.   Pat yourself dry with a clean, freshly-laundered towel.  Dress in clean, freshly laundered night clothes.    Be sure to sleep with clean, freshly laundered sheets.  Day 5:   Last shower is the morning before surgery: Follow above Instructions.    NOTE:    *Hair extensions should be removed    *Keep CHG soap out of eyes and ear canals   *DO NOT wash with regular soap on your body after you have used the CHG        soap solution  *DO NOT apply powders, lotions, or perfume.  *Deodorant may be used days 1-4, BUT NOT the day of surgery    Who should I contact if I have any questions regarding the use of CHG soap?  Call the [facility], Preadmission Testing at [phone] if you have any questions.              Patient Information: Pre-Operative Infection Prevention Measures     Why did I have my nose, under my arms and groin swabbed?  The purpose of the swab is to identify Staphylococcus aureus inside your nose or on your skin.  The swab was sent to the laboratory for culture.  A positive swab/culture for Staphylococcus aureus is called colonization or carriage.      What is Staphylococcus aureus?  Staphylococcus aureus, also known as “staph”, is a germ found on the skin or in the nose of healthy people.  Sometimes Staphylococcus aureus can get into the body and cause an infection.  This can be minor (such as pimples, boils or other skin problems).  It might also be serious (such as blood infection, pneumonia or a surgical site infection).    What is Staphylococcus aureus colonization or carriage?  Colonization or carriage means that a person has the germ but is not sick from it.  These bacteria can be spread on the hands or when breathing or sneezing.    How is Staphylococcus aureus spread?  It is most often spread by close contact with a person or item that carries it.    What happens if my culture is positive for Staphylococcus aureus?  Your doctor/medical team will use this information to guide any antibiotic treatment which may be necessary.  Regardless of the culture results, we will clean the inside of your nose with a betadine swab just before you have  your surgery.      Will I get an infection if I have Staphylococcus aureus in my nose or on my skin?  Anyone can get an infection with Staphylococcus aureus.  However, the best way to reduce your risk of infection is to follow the instructions provided to you for the use of your CHG soap and dental rinse.        Who should I contact if I have any questions?  Call the [facility], Preadmission Testing at [phone] if you have any questions.

## 2024-06-19 ENCOUNTER — OFFICE VISIT (OUTPATIENT)
Dept: SURGERY | Facility: CLINIC | Age: 54
End: 2024-06-19
Payer: COMMERCIAL

## 2024-06-19 VITALS
HEART RATE: 76 BPM | HEIGHT: 61 IN | SYSTOLIC BLOOD PRESSURE: 163 MMHG | TEMPERATURE: 97.9 F | WEIGHT: 182 LBS | DIASTOLIC BLOOD PRESSURE: 101 MMHG | BODY MASS INDEX: 34.36 KG/M2

## 2024-06-19 DIAGNOSIS — K51.011 ULCERATIVE PANCOLITIS WITH RECTAL BLEEDING (MULTI): Primary | ICD-10-CM

## 2024-06-19 PROCEDURE — 99213 OFFICE O/P EST LOW 20 MIN: CPT | Performed by: COLON & RECTAL SURGERY

## 2024-06-19 ASSESSMENT — PAIN SCALES - GENERAL: PAINLEVEL: 8

## 2024-06-19 ASSESSMENT — ENCOUNTER SYMPTOMS: DEPRESSION: 0

## 2024-06-20 LAB — STAPHYLOCOCCUS SPEC CULT: ABNORMAL

## 2024-06-24 ENCOUNTER — ANESTHESIA EVENT (OUTPATIENT)
Dept: OPERATING ROOM | Facility: HOSPITAL | Age: 54
End: 2024-06-24
Payer: COMMERCIAL

## 2024-06-24 ENCOUNTER — PATIENT MESSAGE (OUTPATIENT)
Dept: SURGERY | Facility: CLINIC | Age: 54
End: 2024-06-24
Payer: COMMERCIAL

## 2024-06-24 NOTE — TELEPHONE ENCOUNTER
From: Maureen See  To: Shantel De Leon  Sent: 6/24/2024 12:04 PM EDT  Subject: Shower    Hi Patricia. Quick question. When I shower tonight and tomorrow morning I just need to use the special soap from my neck to all over my stomach right? Or do I need to do my arms and legs? I wash my hair separately in sink. I just want to make sure.

## 2024-06-25 ENCOUNTER — ANESTHESIA (OUTPATIENT)
Dept: OPERATING ROOM | Facility: HOSPITAL | Age: 54
End: 2024-06-25
Payer: COMMERCIAL

## 2024-06-25 ENCOUNTER — HOSPITAL ENCOUNTER (INPATIENT)
Facility: HOSPITAL | Age: 54
End: 2024-06-25
Attending: COLON & RECTAL SURGERY | Admitting: COLON & RECTAL SURGERY
Payer: COMMERCIAL

## 2024-06-25 DIAGNOSIS — K51.90 ULCERATIVE COLITIS WITHOUT COMPLICATIONS, UNSPECIFIED LOCATION (MULTI): ICD-10-CM

## 2024-06-25 DIAGNOSIS — Z98.890 S/P ROBOT-ASSISTED SURGICAL PROCEDURE: ICD-10-CM

## 2024-06-25 DIAGNOSIS — K51.90 ULCERATIVE COLITIS WITHOUT COMPLICATIONS (MULTI): Primary | ICD-10-CM

## 2024-06-25 PROCEDURE — 7100000002 HC RECOVERY ROOM TIME - EACH INCREMENTAL 1 MINUTE: Performed by: COLON & RECTAL SURGERY

## 2024-06-25 PROCEDURE — 2500000005 HC RX 250 GENERAL PHARMACY W/O HCPCS: Performed by: STUDENT IN AN ORGANIZED HEALTH CARE EDUCATION/TRAINING PROGRAM

## 2024-06-25 PROCEDURE — 45395 LAP REMOVAL OF RECTUM: CPT | Performed by: COLON & RECTAL SURGERY

## 2024-06-25 PROCEDURE — 88307 TISSUE EXAM BY PATHOLOGIST: CPT | Mod: TC,AHULAB | Performed by: COLON & RECTAL SURGERY

## 2024-06-25 PROCEDURE — 0DTP4ZZ RESECTION OF RECTUM, PERCUTANEOUS ENDOSCOPIC APPROACH: ICD-10-PCS | Performed by: COLON & RECTAL SURGERY

## 2024-06-25 PROCEDURE — 2500000004 HC RX 250 GENERAL PHARMACY W/ HCPCS (ALT 636 FOR OP/ED): Performed by: STUDENT IN AN ORGANIZED HEALTH CARE EDUCATION/TRAINING PROGRAM

## 2024-06-25 PROCEDURE — 2500000004 HC RX 250 GENERAL PHARMACY W/ HCPCS (ALT 636 FOR OP/ED): Mod: JZ | Performed by: COLON & RECTAL SURGERY

## 2024-06-25 PROCEDURE — 7100000001 HC RECOVERY ROOM TIME - INITIAL BASE CHARGE: Performed by: COLON & RECTAL SURGERY

## 2024-06-25 PROCEDURE — 2500000001 HC RX 250 WO HCPCS SELF ADMINISTERED DRUGS (ALT 637 FOR MEDICARE OP): Performed by: COLON & RECTAL SURGERY

## 2024-06-25 PROCEDURE — A44212 PR LAP,SURG,COLECT,TOT,W/PROCTECT,W/ILEOST: Performed by: ANESTHESIOLOGY

## 2024-06-25 PROCEDURE — 3600000010 HC OR TIME - EACH INCREMENTAL 1 MINUTE - PROCEDURE LEVEL FIVE: Performed by: COLON & RECTAL SURGERY

## 2024-06-25 PROCEDURE — 2720000007 HC OR 272 NO HCPCS: Performed by: COLON & RECTAL SURGERY

## 2024-06-25 PROCEDURE — 2500000004 HC RX 250 GENERAL PHARMACY W/ HCPCS (ALT 636 FOR OP/ED): Performed by: ANESTHESIOLOGY

## 2024-06-25 PROCEDURE — 2500000005 HC RX 250 GENERAL PHARMACY W/O HCPCS: Performed by: NURSE ANESTHETIST, CERTIFIED REGISTERED

## 2024-06-25 PROCEDURE — 2500000004 HC RX 250 GENERAL PHARMACY W/ HCPCS (ALT 636 FOR OP/ED): Performed by: COLON & RECTAL SURGERY

## 2024-06-25 PROCEDURE — 1100000001 HC PRIVATE ROOM DAILY

## 2024-06-25 PROCEDURE — 3700000002 HC GENERAL ANESTHESIA TIME - EACH INCREMENTAL 1 MINUTE: Performed by: COLON & RECTAL SURGERY

## 2024-06-25 PROCEDURE — A44212 PR LAP,SURG,COLECT,TOT,W/PROCTECT,W/ILEOST: Performed by: NURSE ANESTHETIST, CERTIFIED REGISTERED

## 2024-06-25 PROCEDURE — 3700000001 HC GENERAL ANESTHESIA TIME - INITIAL BASE CHARGE: Performed by: COLON & RECTAL SURGERY

## 2024-06-25 PROCEDURE — 3600000005 HC OR TIME - INITIAL BASE CHARGE - PROCEDURE LEVEL FIVE: Performed by: COLON & RECTAL SURGERY

## 2024-06-25 PROCEDURE — 2500000005 HC RX 250 GENERAL PHARMACY W/O HCPCS: Performed by: COLON & RECTAL SURGERY

## 2024-06-25 PROCEDURE — 2500000004 HC RX 250 GENERAL PHARMACY W/ HCPCS (ALT 636 FOR OP/ED): Performed by: NURSE ANESTHETIST, CERTIFIED REGISTERED

## 2024-06-25 RX ORDER — SODIUM CHLORIDE, SODIUM LACTATE, POTASSIUM CHLORIDE, CALCIUM CHLORIDE 600; 310; 30; 20 MG/100ML; MG/100ML; MG/100ML; MG/100ML
100 INJECTION, SOLUTION INTRAVENOUS CONTINUOUS
Status: DISCONTINUED | OUTPATIENT
Start: 2024-06-25 | End: 2024-06-28

## 2024-06-25 RX ORDER — ONDANSETRON HYDROCHLORIDE 2 MG/ML
INJECTION, SOLUTION INTRAVENOUS AS NEEDED
Status: DISCONTINUED | OUTPATIENT
Start: 2024-06-25 | End: 2024-06-25

## 2024-06-25 RX ORDER — LIDOCAINE HYDROCHLORIDE 10 MG/ML
0.1 INJECTION, SOLUTION EPIDURAL; INFILTRATION; INTRACAUDAL; PERINEURAL ONCE
Status: DISCONTINUED | OUTPATIENT
Start: 2024-06-25 | End: 2024-06-25 | Stop reason: HOSPADM

## 2024-06-25 RX ORDER — HYDROMORPHONE HYDROCHLORIDE 1 MG/ML
INJECTION, SOLUTION INTRAMUSCULAR; INTRAVENOUS; SUBCUTANEOUS AS NEEDED
Status: DISCONTINUED | OUTPATIENT
Start: 2024-06-25 | End: 2024-06-25

## 2024-06-25 RX ORDER — ONDANSETRON HYDROCHLORIDE 2 MG/ML
4 INJECTION, SOLUTION INTRAVENOUS ONCE AS NEEDED
Status: COMPLETED | OUTPATIENT
Start: 2024-06-25 | End: 2024-06-25

## 2024-06-25 RX ORDER — HEPARIN SODIUM 5000 [USP'U]/ML
5000 INJECTION, SOLUTION INTRAVENOUS; SUBCUTANEOUS ONCE
Status: COMPLETED | OUTPATIENT
Start: 2024-06-25 | End: 2024-06-25

## 2024-06-25 RX ORDER — ROSUVASTATIN CALCIUM 20 MG/1
20 TABLET, COATED ORAL DAILY
Status: DISPENSED | OUTPATIENT
Start: 2024-06-26

## 2024-06-25 RX ORDER — OXYCODONE HYDROCHLORIDE 5 MG/1
5 TABLET ORAL EVERY 4 HOURS PRN
Status: DISCONTINUED | OUTPATIENT
Start: 2024-06-25 | End: 2024-06-25 | Stop reason: HOSPADM

## 2024-06-25 RX ORDER — OXYCODONE HYDROCHLORIDE 5 MG/1
5 TABLET ORAL EVERY 4 HOURS PRN
Status: ACTIVE | OUTPATIENT
Start: 2024-06-25

## 2024-06-25 RX ORDER — FENTANYL CITRATE 50 UG/ML
INJECTION, SOLUTION INTRAMUSCULAR; INTRAVENOUS AS NEEDED
Status: DISCONTINUED | OUTPATIENT
Start: 2024-06-25 | End: 2024-06-25

## 2024-06-25 RX ORDER — LABETALOL HYDROCHLORIDE 5 MG/ML
5 INJECTION, SOLUTION INTRAVENOUS ONCE AS NEEDED
Status: DISCONTINUED | OUTPATIENT
Start: 2024-06-25 | End: 2024-06-25 | Stop reason: HOSPADM

## 2024-06-25 RX ORDER — PANTOPRAZOLE SODIUM 40 MG/1
40 TABLET, DELAYED RELEASE ORAL
Status: DISCONTINUED | OUTPATIENT
Start: 2024-06-26 | End: 2024-06-26

## 2024-06-25 RX ORDER — SODIUM CHLORIDE 9 MG/ML
10 INJECTION, SOLUTION INTRAVENOUS CONTINUOUS
Status: DISCONTINUED | OUTPATIENT
Start: 2024-06-25 | End: 2024-06-25

## 2024-06-25 RX ORDER — MIDAZOLAM HYDROCHLORIDE 1 MG/ML
INJECTION INTRAMUSCULAR; INTRAVENOUS AS NEEDED
Status: DISCONTINUED | OUTPATIENT
Start: 2024-06-25 | End: 2024-06-25

## 2024-06-25 RX ORDER — GABAPENTIN 300 MG/1
300 CAPSULE ORAL 3 TIMES DAILY
Status: DISPENSED | OUTPATIENT
Start: 2024-06-25

## 2024-06-25 RX ORDER — SODIUM CHLORIDE 9 MG/ML
40 INJECTION, SOLUTION INTRAVENOUS CONTINUOUS
Status: DISCONTINUED | OUTPATIENT
Start: 2024-06-25 | End: 2024-06-28

## 2024-06-25 RX ORDER — PROPOFOL 10 MG/ML
INJECTION, EMULSION INTRAVENOUS CONTINUOUS PRN
Status: DISCONTINUED | OUTPATIENT
Start: 2024-06-25 | End: 2024-06-25

## 2024-06-25 RX ORDER — ONDANSETRON 4 MG/1
4 TABLET, ORALLY DISINTEGRATING ORAL EVERY 8 HOURS PRN
Status: ACTIVE | OUTPATIENT
Start: 2024-06-25

## 2024-06-25 RX ORDER — ACETAMINOPHEN 325 MG/1
650 TABLET ORAL EVERY 6 HOURS
Status: DISPENSED | OUTPATIENT
Start: 2024-06-25

## 2024-06-25 RX ORDER — ROCURONIUM BROMIDE 10 MG/ML
INJECTION, SOLUTION INTRAVENOUS AS NEEDED
Status: DISCONTINUED | OUTPATIENT
Start: 2024-06-25 | End: 2024-06-25

## 2024-06-25 RX ORDER — SODIUM CHLORIDE, SODIUM LACTATE, POTASSIUM CHLORIDE, CALCIUM CHLORIDE 600; 310; 30; 20 MG/100ML; MG/100ML; MG/100ML; MG/100ML
100 INJECTION, SOLUTION INTRAVENOUS CONTINUOUS
Status: DISCONTINUED | OUTPATIENT
Start: 2024-06-25 | End: 2024-06-25 | Stop reason: HOSPADM

## 2024-06-25 RX ORDER — HYDROMORPHONE HYDROCHLORIDE 0.2 MG/ML
0.2 INJECTION INTRAMUSCULAR; INTRAVENOUS; SUBCUTANEOUS EVERY 2 HOUR PRN
Status: DISPENSED | OUTPATIENT
Start: 2024-06-25

## 2024-06-25 RX ORDER — ENOXAPARIN SODIUM 100 MG/ML
40 INJECTION SUBCUTANEOUS EVERY 24 HOURS
Status: DISPENSED | OUTPATIENT
Start: 2024-06-25

## 2024-06-25 RX ORDER — LEVOFLOXACIN 5 MG/ML
500 INJECTION, SOLUTION INTRAVENOUS ONCE
Status: DISCONTINUED | OUTPATIENT
Start: 2024-06-25 | End: 2024-06-25 | Stop reason: HOSPADM

## 2024-06-25 RX ORDER — ONDANSETRON HYDROCHLORIDE 2 MG/ML
4 INJECTION, SOLUTION INTRAVENOUS EVERY 8 HOURS PRN
Status: ACTIVE | OUTPATIENT
Start: 2024-06-25

## 2024-06-25 RX ORDER — OXYCODONE HYDROCHLORIDE 5 MG/1
10 TABLET ORAL EVERY 4 HOURS PRN
Status: DISPENSED | OUTPATIENT
Start: 2024-06-25

## 2024-06-25 RX ORDER — ESCITALOPRAM OXALATE 20 MG/1
20 TABLET ORAL NIGHTLY
Status: DISPENSED | OUTPATIENT
Start: 2024-06-25

## 2024-06-25 RX ORDER — METRONIDAZOLE 500 MG/100ML
500 INJECTION, SOLUTION INTRAVENOUS ONCE
Status: COMPLETED | OUTPATIENT
Start: 2024-06-25 | End: 2024-06-25

## 2024-06-25 RX ORDER — LIDOCAINE HYDROCHLORIDE 20 MG/ML
INJECTION, SOLUTION EPIDURAL; INFILTRATION; INTRACAUDAL; PERINEURAL AS NEEDED
Status: DISCONTINUED | OUTPATIENT
Start: 2024-06-25 | End: 2024-06-25

## 2024-06-25 RX ORDER — METHOCARBAMOL 100 MG/ML
1000 INJECTION, SOLUTION INTRAMUSCULAR; INTRAVENOUS EVERY 8 HOURS PRN
Status: DISCONTINUED | OUTPATIENT
Start: 2024-06-25 | End: 2024-06-28

## 2024-06-25 RX ORDER — DIPHENHYDRAMINE HYDROCHLORIDE 50 MG/ML
12.5 INJECTION INTRAMUSCULAR; INTRAVENOUS ONCE AS NEEDED
Status: DISCONTINUED | OUTPATIENT
Start: 2024-06-25 | End: 2024-06-25 | Stop reason: HOSPADM

## 2024-06-25 RX ORDER — CEFAZOLIN 1 G/1
INJECTION, POWDER, FOR SOLUTION INTRAVENOUS AS NEEDED
Status: DISCONTINUED | OUTPATIENT
Start: 2024-06-25 | End: 2024-06-25

## 2024-06-25 RX ORDER — NALOXONE HYDROCHLORIDE 0.4 MG/ML
0.2 INJECTION, SOLUTION INTRAMUSCULAR; INTRAVENOUS; SUBCUTANEOUS EVERY 5 MIN PRN
Status: ACTIVE | OUTPATIENT
Start: 2024-06-25

## 2024-06-25 RX ORDER — SODIUM CHLORIDE, SODIUM LACTATE, POTASSIUM CHLORIDE, CALCIUM CHLORIDE 600; 310; 30; 20 MG/100ML; MG/100ML; MG/100ML; MG/100ML
INJECTION, SOLUTION INTRAVENOUS CONTINUOUS PRN
Status: DISCONTINUED | OUTPATIENT
Start: 2024-06-25 | End: 2024-06-25

## 2024-06-25 RX ORDER — KETOROLAC TROMETHAMINE 30 MG/ML
15 INJECTION, SOLUTION INTRAMUSCULAR; INTRAVENOUS EVERY 6 HOURS SCHEDULED
Status: ACTIVE | OUTPATIENT
Start: 2024-06-26 | End: 2024-06-28

## 2024-06-25 RX ORDER — KETOROLAC TROMETHAMINE 30 MG/ML
30 INJECTION, SOLUTION INTRAMUSCULAR; INTRAVENOUS ONCE
Status: COMPLETED | OUTPATIENT
Start: 2024-06-25 | End: 2024-06-25

## 2024-06-25 ASSESSMENT — PAIN SCALES - GENERAL
PAINLEVEL_OUTOF10: 0 - NO PAIN
PAINLEVEL_OUTOF10: 8
PAINLEVEL_OUTOF10: 0 - NO PAIN
PAINLEVEL_OUTOF10: 10 - WORST POSSIBLE PAIN
PAINLEVEL_OUTOF10: 7
PAINLEVEL_OUTOF10: 8
PAINLEVEL_OUTOF10: 8
PAINLEVEL_OUTOF10: 7
PAINLEVEL_OUTOF10: 8
PAINLEVEL_OUTOF10: 7
PAINLEVEL_OUTOF10: 0 - NO PAIN
PAINLEVEL_OUTOF10: 10 - WORST POSSIBLE PAIN
PAINLEVEL_OUTOF10: 8
PAINLEVEL_OUTOF10: 0 - NO PAIN
PAINLEVEL_OUTOF10: 8
PAINLEVEL_OUTOF10: 0 - NO PAIN
PAINLEVEL_OUTOF10: 8
PAINLEVEL_OUTOF10: 0 - NO PAIN
PAINLEVEL_OUTOF10: 8

## 2024-06-25 ASSESSMENT — PAIN - FUNCTIONAL ASSESSMENT
PAIN_FUNCTIONAL_ASSESSMENT: 0-10
PAIN_FUNCTIONAL_ASSESSMENT: VAS (VISUAL ANALOG SCALE)
PAIN_FUNCTIONAL_ASSESSMENT: 0-10
PAIN_FUNCTIONAL_ASSESSMENT: VAS (VISUAL ANALOG SCALE)
PAIN_FUNCTIONAL_ASSESSMENT: 0-10
PAIN_FUNCTIONAL_ASSESSMENT: VAS (VISUAL ANALOG SCALE)
PAIN_FUNCTIONAL_ASSESSMENT: 0-10
PAIN_FUNCTIONAL_ASSESSMENT: 0-10
PAIN_FUNCTIONAL_ASSESSMENT: UNABLE TO SELF-REPORT
PAIN_FUNCTIONAL_ASSESSMENT: 0-10
PAIN_FUNCTIONAL_ASSESSMENT: VAS (VISUAL ANALOG SCALE)
PAIN_FUNCTIONAL_ASSESSMENT: UNABLE TO SELF-REPORT
PAIN_FUNCTIONAL_ASSESSMENT: 0-10
PAIN_FUNCTIONAL_ASSESSMENT: 0-10
PAIN_FUNCTIONAL_ASSESSMENT: VAS (VISUAL ANALOG SCALE)
PAIN_FUNCTIONAL_ASSESSMENT: VAS (VISUAL ANALOG SCALE)
PAIN_FUNCTIONAL_ASSESSMENT: 0-10

## 2024-06-25 ASSESSMENT — COLUMBIA-SUICIDE SEVERITY RATING SCALE - C-SSRS
2. HAVE YOU ACTUALLY HAD ANY THOUGHTS OF KILLING YOURSELF?: NO
1. IN THE PAST MONTH, HAVE YOU WISHED YOU WERE DEAD OR WISHED YOU COULD GO TO SLEEP AND NOT WAKE UP?: NO
6. HAVE YOU EVER DONE ANYTHING, STARTED TO DO ANYTHING, OR PREPARED TO DO ANYTHING TO END YOUR LIFE?: NO

## 2024-06-25 ASSESSMENT — PAIN DESCRIPTION - DESCRIPTORS: DESCRIPTORS: ACHING

## 2024-06-25 NOTE — ANESTHESIA POSTPROCEDURE EVALUATION
Patient: Maureen See    Procedure Summary       Date: 06/25/24 Room / Location: [facility] A OR 08 / Virtual [facility] A OR    Anesthesia Start: 1142 Anesthesia Stop: 1414    Procedure: Robot Assisted Completion Proctectomy Diagnosis:       Ulcerative colitis without complications, unspecified location (Multi)      (Ulcerative colitis without complications, unspecified location (Multi) [K51.90])    Surgeons: Shantel De Leon MD Responsible Provider: Cam Barker MD    Anesthesia Type: general ASA Status: 3            Anesthesia Type: general    Vitals Value Taken Time   /82 06/25/24 1413   Temp 36 06/25/24 1416   Pulse 101 06/25/24 1415   Resp 15 06/25/24 1416   SpO2 95 % 06/25/24 1415   Vitals shown include unfiled device data.    Anesthesia Post Evaluation    Patient location during evaluation: PACU  Patient participation: waiting for patient participation  Level of consciousness: sedated  Pain management: adequate  Airway patency: patent  Cardiovascular status: acceptable  Respiratory status: acceptable, face mask and oral airway  Hydration status: acceptable  Postoperative Nausea and Vomiting: none  Comments: Mrs See had severe pain in the PACU and was not getting relief per the PACU RN. I was called to assess her and treated her pain with IV Ketamine total dose of 20 mg over approximately 5 minutes after which she had excellent analgesia for approximately two hours. The remaining 30 mg of ketamine in the syringe was wasted into the [facility] with the PACU RN as a witness and she made notations in her records of the wasting as well.      No notable events documented.

## 2024-06-25 NOTE — PERIOPERATIVE NURSING NOTE
1915 Assuming care of pt at this time. Pt resting quietly asleep in bed.   1930 report sent to room 723 RN via secure chat. Pt into extended care status at this time. Pt resting quietly asleep in bed.   1937 attempting to call pt  x2, no answer straight vm.  1949 Surgical SEAN bedside.   1956 pt  not in WR, to be called and updated upon transfer to floor.   2005 Dr. Garcia bedside aware of pt continued discomfort, no new orders at this time. Pt provided with heat packs and provided with repositioning.   2034 Spoke with pt , made aware of plan of care and transport up to room.  2049 Pt taken up to room by this RN at this time in stable condition. Report previously sent to receiving RN. All belongings taken with pt to room, family updated on pt status.

## 2024-06-25 NOTE — ANESTHESIA PROCEDURE NOTES
Airway  Date/Time: 6/25/2024 11:55 AM  Urgency: elective    Airway not difficult    Staffing  Performed: CRNA   Authorized by: Cam Barker MD    Performed by: KERI Trotter-CRNA, GLENIS  Patient location during procedure: OR    Indications and Patient Condition  Indications for airway management: anesthesia and airway protection  Spontaneous ventilation: present  Sedation level: deep  Preoxygenated: yes  Patient position: sniffing  Mask difficulty assessment: 1 - vent by mask    Final Airway Details  Final airway type: endotracheal airway      Successful airway: ETT  Cuffed: yes   Successful intubation technique: video laryngoscopy  Facilitating devices/methods: intubating stylet  Endotracheal tube insertion site: oral  Blade size: #3  ETT size (mm): 7.0  Cormack-Lehane Classification: grade I - full view of glottis  Placement verified by: capnometry   Measured from: lips  ETT to lips (cm): 21  Number of attempts at approach: 1

## 2024-06-25 NOTE — OP NOTE
Robot Assisted Completion Proctectomy Operative Note     Date: 2024  OR Location: [facility] A OR    Name: Maureen See, : 1970, Age: 53 y.o., MRN: [id number], Sex: female    Diagnosis  Pre-op Diagnosis     * Ulcerative colitis without complications, unspecified location (Multi) [K51.90] Post-op Diagnosis     * Ulcerative colitis without complications, unspecified location (Multi) [K51.90]     Procedures  Robot Assisted Completion Proctectomy  81043 - AK LAPS COLECTOMY ABDL W/PROCTECTOMY W/ILEOSTOMY      Surgeons      * Shantel De Leon - Primary    Resident/Fellow/Other Assistant:  Surgeons and Role:  * No surgeons found with a matching role *    Procedure Summary  Anesthesia: General  ASA: III  Anesthesia Staff: Anesthesiologist: Cam Barker MD  CRNA: KERI Trotter-JAIRO, GLENIS  Estimated Blood Loss: 50mL  Intra-op Medications:   Administrations occurring from 1200 to 1530 on 24:   Medication Name Total Dose   BUPivacaine HCl (Marcaine) 0.5 % (5 mg/mL) 30 mL in sodium chloride 0.9% 30 mL syringe 50 mL   HYDROmorphone (Dilaudid) injection 0.5 mg 1 mg   sodium chloride 0.9% infusion Cannot be calculated              Anesthesia Record               Intraprocedure I/O Totals          Intake    Propofol Drip 0.00 mL    The total shown is the total volume documented since Anesthesia Start was filed.    sodium chloride 0.9% infusion 1000.00 mL    metroNIDAZOLE (Flagyl) 500 mg in NaCl (iso-os) 100 mL 100.00 mL    Total Intake 1100 mL       Output    Urine 200 mL    Total Output 200 mL       Net    Net Volume 900 mL          Specimen:   ID Type Source Tests Collected by Time   1 : Rectum and Anus Tissue ANUS RESECTION SURGICAL PATHOLOGY EXAM Maggi Nolasco RN 2024 1330        Staff:   Circulator: Maggi  Scrmansi Person: Alicja Lopez Circulator: Tamara Lopez Scrub: Kristin  Scrub Person: Addie         Drains and/or Catheters:   Closed/Suction Drain Superior Abdomen Bulb 19 Fr. (Active)   Dressing  Status Clean;Dry;Occlusive 06/25/24 1411   Drainage Appearance Bloody 06/25/24 1411   Output (mL) 35 mL 06/25/24 1435       Urethral Catheter Non-latex 16 Fr. (Active)   Site Assessment Clean;Skin intact 06/25/24 1411   Collection Container Standard drainage bag 06/25/24 1411   Securement Method Securing device (Describe) 06/25/24 1411         Findings: no adhesions - staple line at the peritoneal reflection.     Indications: Maureen See is an 53 y.o. female who is having surgery for Ulcerative colitis without complications, unspecified location (Multi) [K51.90].     The patient was seen in the preoperative area. The risks, benefits, complications, treatment options, non-operative alternatives, expected recovery and outcomes were discussed with the patient. The possibilities of reaction to medication, pulmonary aspiration, injury to surrounding structures, bleeding, recurrent infection, the need for additional procedures, failure to diagnose a condition, and creating a complication requiring transfusion or operation were discussed with the patient. The patient concurred with the proposed plan, giving informed consent.  The site of surgery was properly noted/marked if necessary per policy. The patient has been actively warmed in preoperative area. Preoperative antibiotics have been ordered and given within 1 hours of incision. Venous thrombosis prophylaxis have been ordered including bilateral sequential compression devices and chemical prophylaxis    Procedure Details: Procedure Details: Patient was brought to to the operating room, huddle was performed all were in agreement, they were moved to the table in the supine split leg position, and general endotracheal anesthesia was induced.  IV antibiotics and a heparin shot were administered and they were prepped using ChloraPrep which was allowed to dry for 3 minutes and taped and draped in the usual sterile fashion.  The stoma appliance was removed and the stoma was  prepped using Betadine and covered using a Ray kristen and Ioban.      An 8mm incision was made and the Optiview trocar was used to enter the peritoneal cavity with ease. There was no injury to the underlying bowel.  Insufflation was established and there were no adhesions.  2 8 mm robotic ports were placed in the left abdomen and 1 in the right below the stoma, and a 5 mm assistant port was placed inferolateral to the stoma.  The robot was docked towards the pelvis.  A monopolar scissors, bipolar grasper and a cadiere were introduced.  The uterus was retracted using a 2.0 PDS on a Erich needle through the abdominal wall.  There was a small fibroid on the uterus the tubes and ovaries appeared normal.      The sides of the peritoneum on the right and the left were dissected out we identified the left ureter the presacral tissues were dissected and the 2 dissections were joined and the superior hemorrhoidal was taken in this plane using robotic Weck clips.  The avascular presacral plane was dissected down to the pelvic floor.   The bilateral lateral attachments were taken with ease and anterior was dissected posterior to Denonvillier's fascia down to the pelvic floor.  This was all done with minimal bleeding.  We were able to dissect through the levators adjacent to the right and left sides of the rectum.  We then suctioned out the pelvis and a 19 Danish Morgan drain was placed through the LLQ incision down posterior to the rectum, a four-quadrant tap block was performed instilling 10 cc of quarter percent Marcaine in the right upper quadrant right lower quadrant left upper quadrant left lower quadrant under direct vision.The drain was then sutured in place, and the port sites were closed using 4.0 Monocryl and covered using liquiband.     We then went down below.  The perineum was prepped using betadine, and a butt drape was used.  4 anal everting sutures of 1.0 Vicryl were placed external to the anus.  The  intersphincteric grove was incised using cut on the bovie cautery.  The intersphincteric space was dissected with blunt dissection and bovie cautery.  A finger was easily able to find the dissection from above and the bovie was used to divide remaining levator.  The rectum was brought out posteriorly and the anterior levator was taken using the cautery with care to avoid the vagina.  The specimen was sent as rectum and anus.      The drain was positioned in the pelvis.  There was no small bowel.  Any bleeding was stopped.  The levators were reapproximated using 2.0 Vicryl stitches in a figure of eight fashion.  The external sphincters were brought together in the same way.  The skin was reapproximated using 3.0 Vicryl sutures in a vertical mattress fashion.  The skin was covered using ABD and mesh panties.  A stoma appliance was applied above and a drain sponge.            Complications:  None; patient tolerated the procedure well.    Disposition: PACU - hemodynamically stable.  Condition: stable       Attending Attestation: I performed the procedure.    Shantel De Leon  Phone Number: [phone]

## 2024-06-25 NOTE — ANESTHESIA PREPROCEDURE EVALUATION
Patient: Maureen See    Procedure Information       Date/Time: 06/25/24 1200    Procedure: Robot Assisted Completion Proctectomy    Location: [facility] OR 08 / [facility] OR    Surgeons: Shantel De Leon MD            Relevant Problems   Cardiac   (+) Familial hypercholesterolemia      Neuro   (+) Anxiety   (+) Mild major depression (CMS-HCC)      GI   (+) Acid reflux   (+) IBS (irritable bowel syndrome)   (+) Ulcerative colitis (Multi)   (+) Ulcerative colitis without complications (Multi)      Liver   (+) Fatty liver      Hematology   (+) Iron deficiency anemia      ID   (+) Toenail fungus       Clinical information reviewed:   Tobacco  Allergies  Meds   Med Hx  Surg Hx  OB Status  Fam Hx  Soc   Hx        NPO Detail:  NPO/Void Status  Carbohydrate Drink Given Prior to Surgery? : N  Date of Last Liquid: 06/25/24  Time of Last Liquid: 0730  Date of Last Solid: 06/24/24  Time of Last Solid: 1800  Last Intake Type: Clear fluids  Time of Last Void: 1055         Physical Exam    Airway  Mallampati: II  TM distance: >3 FB  Neck ROM: full     Cardiovascular   Rhythm: regular  Rate: normal     Dental    Pulmonary   Breath sounds clear to auscultation     Abdominal            Anesthesia Plan    History of general anesthesia?: yes  History of complications of general anesthesia?: no    ASA 3     general     intravenous induction   Anesthetic plan and risks discussed with patient.    Plan discussed with CRNA.

## 2024-06-26 LAB
ANION GAP SERPL CALC-SCNC: 15 MMOL/L (ref 10–20)
BUN SERPL-MCNC: 15 MG/DL (ref 6–23)
CALCIUM SERPL-MCNC: 8.6 MG/DL (ref 8.6–10.3)
CHLORIDE SERPL-SCNC: 104 MMOL/L (ref 98–107)
CO2 SERPL-SCNC: 26 MMOL/L (ref 21–32)
CREAT SERPL-MCNC: 0.76 MG/DL (ref 0.5–1.05)
EGFRCR SERPLBLD CKD-EPI 2021: >90 ML/MIN/1.73M*2
ERYTHROCYTE [DISTWIDTH] IN BLOOD BY AUTOMATED COUNT: 13.6 % (ref 11.5–14.5)
GLUCOSE BLD MANUAL STRIP-MCNC: 206 MG/DL (ref 74–99)
GLUCOSE SERPL-MCNC: 132 MG/DL (ref 74–99)
HCT VFR BLD AUTO: 37.8 % (ref 36–46)
HGB BLD-MCNC: 11.8 G/DL (ref 12–16)
MCH RBC QN AUTO: 29.6 PG (ref 26–34)
MCHC RBC AUTO-ENTMCNC: 31.2 G/DL (ref 32–36)
MCV RBC AUTO: 95 FL (ref 80–100)
NRBC BLD-RTO: 0 /100 WBCS (ref 0–0)
PLATELET # BLD AUTO: 277 X10*3/UL (ref 150–450)
POTASSIUM SERPL-SCNC: 4.5 MMOL/L (ref 3.5–5.3)
RBC # BLD AUTO: 3.99 X10*6/UL (ref 4–5.2)
SODIUM SERPL-SCNC: 140 MMOL/L (ref 136–145)
WBC # BLD AUTO: 14.9 X10*3/UL (ref 4.4–11.3)

## 2024-06-26 PROCEDURE — 82947 ASSAY GLUCOSE BLOOD QUANT: CPT

## 2024-06-26 PROCEDURE — 36415 COLL VENOUS BLD VENIPUNCTURE: CPT | Performed by: COLON & RECTAL SURGERY

## 2024-06-26 PROCEDURE — 9420000001 HC RT PATIENT EDUCATION 5 MIN

## 2024-06-26 PROCEDURE — 2500000004 HC RX 250 GENERAL PHARMACY W/ HCPCS (ALT 636 FOR OP/ED): Performed by: COLON & RECTAL SURGERY

## 2024-06-26 PROCEDURE — 85027 COMPLETE CBC AUTOMATED: CPT | Performed by: COLON & RECTAL SURGERY

## 2024-06-26 PROCEDURE — 2500000004 HC RX 250 GENERAL PHARMACY W/ HCPCS (ALT 636 FOR OP/ED): Performed by: STUDENT IN AN ORGANIZED HEALTH CARE EDUCATION/TRAINING PROGRAM

## 2024-06-26 PROCEDURE — 80048 BASIC METABOLIC PNL TOTAL CA: CPT | Performed by: COLON & RECTAL SURGERY

## 2024-06-26 PROCEDURE — 1100000001 HC PRIVATE ROOM DAILY

## 2024-06-26 PROCEDURE — 2500000001 HC RX 250 WO HCPCS SELF ADMINISTERED DRUGS (ALT 637 FOR MEDICARE OP): Performed by: COLON & RECTAL SURGERY

## 2024-06-26 PROCEDURE — 2500000002 HC RX 250 W HCPCS SELF ADMINISTERED DRUGS (ALT 637 FOR MEDICARE OP, ALT 636 FOR OP/ED): Performed by: COLON & RECTAL SURGERY

## 2024-06-26 PROCEDURE — 2500000005 HC RX 250 GENERAL PHARMACY W/O HCPCS: Performed by: COLON & RECTAL SURGERY

## 2024-06-26 RX ORDER — ESOMEPRAZOLE MAGNESIUM 40 MG/1
40 GRANULE, DELAYED RELEASE ORAL
Status: DISPENSED | OUTPATIENT
Start: 2024-06-27

## 2024-06-26 RX ORDER — TAMSULOSIN HYDROCHLORIDE 0.4 MG/1
0.4 CAPSULE ORAL DAILY
Status: DISPENSED | OUTPATIENT
Start: 2024-06-26

## 2024-06-26 RX ORDER — PANTOPRAZOLE SODIUM 40 MG/1
40 TABLET, DELAYED RELEASE ORAL
Status: DISPENSED | OUTPATIENT
Start: 2024-06-27

## 2024-06-26 RX ORDER — PANTOPRAZOLE SODIUM 40 MG/10ML
40 INJECTION, POWDER, LYOPHILIZED, FOR SOLUTION INTRAVENOUS
Status: ACTIVE | OUTPATIENT
Start: 2024-06-27

## 2024-06-26 ASSESSMENT — PAIN - FUNCTIONAL ASSESSMENT: PAIN_FUNCTIONAL_ASSESSMENT: 0-10

## 2024-06-26 ASSESSMENT — PAIN DESCRIPTION - LOCATION: LOCATION: ABDOMEN

## 2024-06-26 ASSESSMENT — COGNITIVE AND FUNCTIONAL STATUS - GENERAL
DRESSING REGULAR LOWER BODY CLOTHING: A LOT
TURNING FROM BACK TO SIDE WHILE IN FLAT BAD: A LOT
MOVING FROM LYING ON BACK TO SITTING ON SIDE OF FLAT BED WITH BEDRAILS: A LOT
MOVING TO AND FROM BED TO CHAIR: A LOT
DRESSING REGULAR UPPER BODY CLOTHING: A LOT
WALKING IN HOSPITAL ROOM: A LOT
MOBILITY SCORE: 12
TOILETING: A LOT
DAILY ACTIVITIY SCORE: 15
STANDING UP FROM CHAIR USING ARMS: A LOT
HELP NEEDED FOR BATHING: A LOT
CLIMB 3 TO 5 STEPS WITH RAILING: A LOT
PERSONAL GROOMING: A LITTLE

## 2024-06-26 ASSESSMENT — PAIN SCALES - GENERAL
PAINLEVEL_OUTOF10: 0 - NO PAIN
PAINLEVEL_OUTOF10: 10 - WORST POSSIBLE PAIN

## 2024-06-26 ASSESSMENT — ACTIVITIES OF DAILY LIVING (ADL): LACK_OF_TRANSPORTATION: NO

## 2024-06-26 NOTE — PROGRESS NOTES
"Maureen See is a 53 y.o. female on day 1 of admission presenting with Ulcerative colitis (Multi).    Subjective   Today patient is feeling okay following surgery. Her bottom is very sore and she is having a hard time getting comfortable. Eating breakfast, no nausea/vomiting/bloating. Has not been getting up and moving yet.       Objective     Physical Exam  Vitals reviewed.   Constitutional:       Appearance: Normal appearance.   Cardiovascular:      Rate and Rhythm: Normal rate and regular rhythm.      Pulses: Normal pulses.      Heart sounds: Normal heart sounds.   Pulmonary:      Effort: Pulmonary effort is normal.      Breath sounds: Normal breath sounds.      Comments: 2L NC  Abdominal:      General: Bowel sounds are normal.      Palpations: Abdomen is soft.      Comments: Abdomen soft, appropriately tender, mildly distended. Stoma is beefy red, moist, producing brown liquid stool, and gas, well pouched. DILAN in place with 285ml in 24hrs serosanguinous drainage.      Musculoskeletal:      Cervical back: Normal range of motion.   Skin:     General: Skin is warm and dry.   Neurological:      Mental Status: She is alert and oriented to person, place, and time.   Psychiatric:         Behavior: Behavior normal.         Last Recorded Vitals  Blood pressure 127/75, pulse 95, temperature 36.9 °C (98.5 °F), temperature source Temporal, resp. rate 18, height 1.549 m (5' 1\"), weight 81.7 kg (180 lb 1.2 oz), SpO2 98%.  Intake/Output last 3 Shifts:  I/O last 3 completed shifts:  In: 2100 (25.7 mL/kg) [P.O.:300; I.V.:1700 (20.8 mL/kg); IV Piggyback:100]  Out: 985 (12.1 mL/kg) [Urine:700 (0.2 mL/kg/hr); Drains:285]  Weight: 81.7 kg     Medications  Scheduled medications  acetaminophen, 650 mg, oral, q6h  enoxaparin, 40 mg, subcutaneous, q24h  escitalopram, 20 mg, oral, Nightly  gabapentin, 300 mg, oral, TID  ketorolac, 15 mg, intravenous, q6h AMELIA  oxygen, , inhalation, Continuous - 02/gases  pantoprazole, 40 mg, oral, Daily " "before breakfast  rosuvastatin, 20 mg, oral, Daily      Continuous medications  lactated Ringer's, 100 mL/hr, Last Rate: 100 mL/hr (06/26/24 1015)  sodium chloride 0.9%, 40 mL/hr, Last Rate: 40 mL/hr (06/26/24 1015)      PRN medications  PRN medications: HYDROmorphone, methocarbamol, naloxone, ondansetron ODT **OR** ondansetron, oxyCODONE, oxyCODONE    Relevant Results  Results for orders placed or performed during the hospital encounter of 06/25/24 (from the past 24 hour(s))   Basic Metabolic Panel   Result Value Ref Range    Glucose 132 (H) 74 - 99 mg/dL    Sodium 140 136 - 145 mmol/L    Potassium 4.5 3.5 - 5.3 mmol/L    Chloride 104 98 - 107 mmol/L    Bicarbonate 26 21 - 32 mmol/L    Anion Gap 15 10 - 20 mmol/L    Urea Nitrogen 15 6 - 23 mg/dL    Creatinine 0.76 0.50 - 1.05 mg/dL    eGFR >90 >60 mL/min/1.73m*2    Calcium 8.6 8.6 - 10.3 mg/dL   CBC   Result Value Ref Range    WBC 14.9 (H) 4.4 - 11.3 x10*3/uL    nRBC 0.0 0.0 - 0.0 /100 WBCs    RBC 3.99 (L) 4.00 - 5.20 x10*6/uL    Hemoglobin 11.8 (L) 12.0 - 16.0 g/dL    Hematocrit 37.8 36.0 - 46.0 %    MCV 95 80 - 100 fL    MCH 29.6 26.0 - 34.0 pg    MCHC 31.2 (L) 32.0 - 36.0 g/dL    RDW 13.6 11.5 - 14.5 %    Platelets 277 150 - 450 x10*3/uL     No orders to display           Assessment/Plan   Principal Problem:    Ulcerative colitis (Multi)  Active Problems:    Ulcerative colitis without complications (Multi)      Maureen See is on Day 1 of admission. Patient presented to preop on 6/25 for scheduled procedure. She is now POD 1 s/p robot assisted completion proctectomy with  which showed findings of \"no adhesions - staple line at the peritoneal reflection \". Today She is doing well, just has some discomfort in her bottom. Her abdomen is soft, appropriately tender, and mildly distended. Stoma is Beefy red, moist, producing brown liquid stool, and gas, well pouched. DILAN drain is producing serosanguinous drainage.      Neuro: post-surgical pain, currently " well managed  Hx: anxiety, depression  - OOB/ ambulate as much as tolerated  - Scheduled tylenol, neurontin, toradol  - PRN dilaudid, robaxin, oxycodone  - Home meds: lexapro, rosuvastatin  - music therapy     CV: RRR, BP stable  Hx: HLD  - VS every 4 hours      Pulm: Patent airways, equal and symmetrical chest expansion, on 2L NC  Hx: asthma  - IS every hour while awake   - Pulse ox every 4 hours with VS      GI: Robot Assisted Completion Proctectomy   Abdomen slightly distended, soft, appropriately tender, no peritoneal signs,   Hx: ulcerative colitis s/p tac with end ileostomy May 2023  - regular diet  - OOB/ambulation  - PRN antiemetic   - PPI     : voiding without difficulty  - Clay catheter in place  - Electrolytes wnl  - Monitor I&Os      HEME:   - DVT Proph   - SCDs/ ambulate/lovenox     ID: Afebrile  - WBC 14.9 from 8.6  - Monitor for s/s infection     Dispo: Encourage ambulation as much as possible. Ordered music therapy for additional pain measures. discontinue clay tomorrow. Continue care on nursing floor. Plan of care discussed  with Dr. De Leon.      I spent 35 minutes in the professional and overall care of this patient.      KERI Mariscal-CNP

## 2024-06-26 NOTE — PROGRESS NOTES
06/26/24 1029   Discharge Planning   Living Arrangements Spouse/significant other;Children   Support Systems Spouse/significant other;Children   Assistance Needed Independent prior to admission   Type of Residence Private residence   Number of Stairs to Enter Residence 3   Number of Stairs Within Residence 0   Do you have animals or pets at home? Yes   Type of Animals or Pets 4 cats and 1 dog   Who is requesting discharge planning? Patient   Home or Post Acute Services None   Patient expects to be discharged to: Home   Does the patient need discharge transport arranged? No   Financial Resource Strain   How hard is it for you to pay for the very basics like food, housing, medical care, and heating? Not very   Housing Stability   In the last 12 months, was there a time when you were not able to pay the mortgage or rent on time? N   In the last 12 months, how many places have you lived? 1   In the last 12 months, was there a time when you did not have a steady place to sleep or slept in a shelter (including now)? N   Transportation Needs   In the past 12 months, has lack of transportation kept you from medical appointments or from getting medications? no   In the past 12 months, has lack of transportation kept you from meetings, work, or from getting things needed for daily living? No     Met with patient at the bedside to discuss discharge plan.  She is POD 1 for a proctectomy.  Patient has an ileostomy (for about a year) and is independent with the care of it.  She plans to be discharged home and her  will transport her when medically cleared.  PLAN: pain control, OOB  ADOD: 2-3 days  Christina Leong RN

## 2024-06-26 NOTE — POST-PROCEDURE NOTE
Subjective:  Examined in PACU, patient in a lot of pain at that time primarily lower back pain which she reports is chronic.       PE:  Constitutional: A&Ox3, drowsy, appears uncomfortable   Eyes: PERRL, clear sclera.  Head/Neck: Neck supple.  Cardiovascular: Normal rate and regular rhythm.   Respiratory/Thorax: Unlabored breathing. On RA  Gastrointestinal: abd soft, mildly distended, appropriately tender, stoma was Beefy red, moist, well pouched. DILAN with SS output   Genitourinary: awaiting independent void   Musculoskeletal: ROM intact.  Neurological: A&Ox3, No focal deficits.  Psychological: Appropriate mood and behavior.       Radiology and labs reviewed. VSS, afebrile.     POD0 complete proctectomy with ileostomy with Dr. De Leon 2/2   Intra-op findings: no adhesions - staple line at the peritoneal reflection.     Plan:   - Diet: regular diet  - add IV robaxin for lower back pain, continue toradol   - Continue current pain regimen   - PRN antiemetic   - DVT Proph: SCDs/ ambulate/ Lovenox  - Bowel regimen: PRN Miralax  - Monitor VS every 4 hours   - Labs ordered for AM   - IS every hour while awake   - Encourage ambulation / OOB as tolerated   - Follow up intra-op cultures  - remove clay POD2  - consult enterostomal nurse, document stoma output   - drain care Qshift  - Continue supportive care     Dispo: Pain and nausea control as needed. OOB as able.     I spent 35 minutes in the professional and overall care of this patient.     Bev Novoa PA-C  Department of Surgical Services  [facility]

## 2024-06-26 NOTE — PROGRESS NOTES
Music Therapy Note    Maureen See was referred by Shantel De Leon    Therapy Session  Referral Type: New referral this admission  Visit Type: New visit  Session Start Time: 1134  Session End Time: 1150  Intervention Delivery: In-person  Conflict of Service: None  Number of family members present: 0  Number of staff members present: 0     Pre-assessment  Pain Score: 7  Mood/Affect: Calm, Participative  Verbalized Emotional State:  (physical pain)         Treatment/Interventions  Music Therapy Interventions: Assessment  Interruption: No  Patient Fell Asleep at End of Session: No    Post-assessment  Unable to Assess Reason: Did not provide expressive therapy intervention  Continue Visiting: Yes  Total Session Time (min): 16 minutes    Narrative  Assessment Detail: Upon assessment pt reported she was experiencing pain. Pt shared she recently recieved medicine and was waiting for it to settle. MT introduced self and services, offering music interventions to assist with this admission. Pt engaged in discussion regarding music preferences, particularly Hip-Hop, Country, and Pop. Through discussion pt discussed medical history. Pt shared activities she will be excited to do after recovery including Hip-Hop classes. Pt and MT discussed pt plans for pain management, coping, and relaxation during recovery. Pt expressed interest in word searches, music, and coloring. Pt also mentioned having support through a facebook group with people who have similar medical histories.  Follow-up: MT will follow up with pt as able    Education Documentation  Coping Strategies, taught by Soraya Austin at 6/26/2024  1:16 PM.  Learner: Patient  Readiness: Acceptance  Method: Explanation  Response: Verbalizes Understanding    Relaxation, taught by Soraya Austin at 6/26/2024  1:16 PM.  Learner: Patient  Readiness: Acceptance  Method: Explanation  Response: Verbalizes Understanding    Pain Management, taught by Soraya Austin at  6/26/2024  1:16 PM.  Learner: Patient  Readiness: Acceptance  Method: Explanation  Response: Verbalizes Understanding

## 2024-06-27 LAB
ANION GAP SERPL CALC-SCNC: 10 MMOL/L (ref 10–20)
BUN SERPL-MCNC: 12 MG/DL (ref 6–23)
CALCIUM SERPL-MCNC: 7.8 MG/DL (ref 8.6–10.3)
CHLORIDE SERPL-SCNC: 109 MMOL/L (ref 98–107)
CO2 SERPL-SCNC: 26 MMOL/L (ref 21–32)
CREAT SERPL-MCNC: 0.71 MG/DL (ref 0.5–1.05)
EGFRCR SERPLBLD CKD-EPI 2021: >90 ML/MIN/1.73M*2
ERYTHROCYTE [DISTWIDTH] IN BLOOD BY AUTOMATED COUNT: 14 % (ref 11.5–14.5)
GLUCOSE SERPL-MCNC: 111 MG/DL (ref 74–99)
HCT VFR BLD AUTO: 34.4 % (ref 36–46)
HGB BLD-MCNC: 10.6 G/DL (ref 12–16)
MCH RBC QN AUTO: 29.3 PG (ref 26–34)
MCHC RBC AUTO-ENTMCNC: 30.8 G/DL (ref 32–36)
MCV RBC AUTO: 95 FL (ref 80–100)
NRBC BLD-RTO: 0 /100 WBCS (ref 0–0)
PLATELET # BLD AUTO: 185 X10*3/UL (ref 150–450)
POTASSIUM SERPL-SCNC: 3.6 MMOL/L (ref 3.5–5.3)
RBC # BLD AUTO: 3.62 X10*6/UL (ref 4–5.2)
SODIUM SERPL-SCNC: 141 MMOL/L (ref 136–145)
WBC # BLD AUTO: 9.2 X10*3/UL (ref 4.4–11.3)

## 2024-06-27 PROCEDURE — 2500000002 HC RX 250 W HCPCS SELF ADMINISTERED DRUGS (ALT 637 FOR MEDICARE OP, ALT 636 FOR OP/ED): Performed by: COLON & RECTAL SURGERY

## 2024-06-27 PROCEDURE — 1100000001 HC PRIVATE ROOM DAILY

## 2024-06-27 PROCEDURE — 2500000004 HC RX 250 GENERAL PHARMACY W/ HCPCS (ALT 636 FOR OP/ED): Performed by: STUDENT IN AN ORGANIZED HEALTH CARE EDUCATION/TRAINING PROGRAM

## 2024-06-27 PROCEDURE — 2500000001 HC RX 250 WO HCPCS SELF ADMINISTERED DRUGS (ALT 637 FOR MEDICARE OP): Performed by: COLON & RECTAL SURGERY

## 2024-06-27 PROCEDURE — 36415 COLL VENOUS BLD VENIPUNCTURE: CPT

## 2024-06-27 PROCEDURE — 82374 ASSAY BLOOD CARBON DIOXIDE: CPT

## 2024-06-27 PROCEDURE — 85027 COMPLETE CBC AUTOMATED: CPT

## 2024-06-27 PROCEDURE — 2500000004 HC RX 250 GENERAL PHARMACY W/ HCPCS (ALT 636 FOR OP/ED): Performed by: COLON & RECTAL SURGERY

## 2024-06-27 PROCEDURE — 2500000001 HC RX 250 WO HCPCS SELF ADMINISTERED DRUGS (ALT 637 FOR MEDICARE OP)

## 2024-06-27 NOTE — PROGRESS NOTES
"Maureen See is a 53 y.o. female on day 2 of admission presenting with Ulcerative colitis (Multi).    Subjective   NAEO. Patient awake, lying in bed, NAD. Tolerating breakfast. Having some pain to her buttom and some generalized achy to her abdomen, but for the most part it is controlled. Patient denies any N/V. Is on 1L NC, but denies any SOB.        Objective     Physical Exam  Vitals reviewed.   Constitutional:       Appearance: Normal appearance.   Cardiovascular:      Rate and Rhythm: Normal rate and regular rhythm.      Pulses: Normal pulses.      Heart sounds: Normal heart sounds.   Pulmonary:      Effort: Pulmonary effort is normal.      Breath sounds: Normal breath sounds.      Comments: 1L NC  Abdominal:      General: Bowel sounds are normal.      Palpations: Abdomen is soft.      Comments: Abdomen soft, appropriately tender, mildly distended. Stoma is beefy red, moist, producing brown mushy stool, and gas, well pouched. DILAN 160ml/24h, SS   Musculoskeletal:      Cervical back: Normal range of motion.   Skin:     General: Skin is warm and dry.   Neurological:      Mental Status: She is alert and oriented to person, place, and time.   Psychiatric:         Behavior: Behavior normal.         Last Recorded Vitals  Blood pressure 139/70, pulse 90, temperature 36.5 °C (97.7 °F), resp. rate 16, height 1.549 m (5' 1\"), weight 81.7 kg (180 lb 1.2 oz), SpO2 98%.  Intake/Output last 3 Shifts:  I/O last 3 completed shifts:  In: 3843.1 (47.1 mL/kg) [P.O.:1700; I.V.:2143.1 (26.2 mL/kg)]  Out: 2900 (35.5 mL/kg) [Urine:2200 (0.7 mL/kg/hr); Drains:300; Stool:400]  Weight: 81.7 kg     Medications  Scheduled medications  acetaminophen, 650 mg, oral, q6h  enoxaparin, 40 mg, subcutaneous, q24h  escitalopram, 20 mg, oral, Nightly  pantoprazole, 40 mg, oral, Daily before breakfast   Or  esomeprazole, 40 mg, nasoduodenal tube, Daily before breakfast   Or  pantoprazole, 40 mg, intravenous, Daily before breakfast  gabapentin, 300 mg, " oral, TID  ketorolac, 15 mg, intravenous, q6h AMELIA  oxygen, , inhalation, Continuous - 02/gases  rosuvastatin, 20 mg, oral, Daily  tamsulosin, 0.4 mg, oral, Daily      Continuous medications  lactated Ringer's, 100 mL/hr, Last Rate: 100 mL/hr (06/26/24 1751)  sodium chloride 0.9%, 40 mL/hr, Last Rate: 40 mL/hr (06/26/24 1751)      PRN medications  PRN medications: HYDROmorphone, methocarbamol, naloxone, ondansetron ODT **OR** ondansetron, oxyCODONE, oxyCODONE    Relevant Results  Results for orders placed or performed during the hospital encounter of 06/25/24 (from the past 24 hour(s))   POCT GLUCOSE   Result Value Ref Range    POCT Glucose 206 (H) 74 - 99 mg/dL   CBC   Result Value Ref Range    WBC 9.2 4.4 - 11.3 x10*3/uL    nRBC 0.0 0.0 - 0.0 /100 WBCs    RBC 3.62 (L) 4.00 - 5.20 x10*6/uL    Hemoglobin 10.6 (L) 12.0 - 16.0 g/dL    Hematocrit 34.4 (L) 36.0 - 46.0 %    MCV 95 80 - 100 fL    MCH 29.3 26.0 - 34.0 pg    MCHC 30.8 (L) 32.0 - 36.0 g/dL    RDW 14.0 11.5 - 14.5 %    Platelets 185 150 - 450 x10*3/uL   Basic Metabolic Panel   Result Value Ref Range    Glucose 111 (H) 74 - 99 mg/dL    Sodium 141 136 - 145 mmol/L    Potassium 3.6 3.5 - 5.3 mmol/L    Chloride 109 (H) 98 - 107 mmol/L    Bicarbonate 26 21 - 32 mmol/L    Anion Gap 10 10 - 20 mmol/L    Urea Nitrogen 12 6 - 23 mg/dL    Creatinine 0.71 0.50 - 1.05 mg/dL    eGFR >90 >60 mL/min/1.73m*2    Calcium 7.8 (L) 8.6 - 10.3 mg/dL     No orders to display           Assessment/Plan   Principal Problem:    Ulcerative colitis (Multi)  Active Problems:    Ulcerative colitis without complications (Multi)      Maureen LIPSCOMB Esa is on Day 2 of admission. Patient presented to preop on 6/25 for scheduled procedure. She is now POD 2 s/p robot assisted completion proctectomy with Dr. De Leon. Findings- no adhesions - staple line at the peritoneal reflection.     Neuro: post-surgical pain, currently well managed  Hx: anxiety, depression  - OOB/ ambulate as much as tolerated  -  Scheduled tylenol, neurontin, toradol  - PRN dilaudid, robaxin, oxycodone  - Home meds: lexapro, rosuvastatin  - music therapy     CV: RRR, BP stable  Hx: HLD  - VS every 4 hours      Pulm: Patent airways, equal and symmetrical chest expansion, on 1L NC  Hx: asthma  - IS every hour while awake   - Pulse ox every 4 hours with VS   - Wean off oxygen if able      GI: Robot Assisted Completion Proctectomy   Hx: ulcerative colitis s/p tac with end ileostomy May 2023  - regular diet  - OOB/ambulation  - PRN antiemetic   - PPI     : voiding without difficulty  - Birch removed, TOV  - Electrolytes wnl  - Monitor I&Os      HEME:   - DVT Proph   - SCDs/ ambulate/lovenox     ID: Afebrile  - WBC WNL  - Monitor for s/s infection     Dispo:  OOB, ambulate. Hopefull discharge this weekend.       I spent 35 minutes in the professional and overall care of this patient.      Sumi Hamilton, APRN-CNP

## 2024-06-27 NOTE — PROGRESS NOTES
06/27/24 1410   ACS Disability Status   Are you deaf or do you have serious difficulty hearing? N   Are you blind or do you have serious difficulty seeing, even when wearing glasses? N   Because of a physical, mental, or emotional condition, do you have serious difficulty concentrating, remembering, or making decisions? (5 years old or older) N   Do you have serious difficulty walking or climbing stairs? N   Do you have serious difficulty dressing or bathing? N   Because of a physical, mental, or emotional condition, do you have serious difficulty doing errands alone such as visiting the doctor? N

## 2024-06-27 NOTE — CONSULTS
"[facility] nursing visit outcome: Ambulated to [facility] with Mrs. See. Perineal ab pad was replaced. Pouch seal was assessed and pouch was emptied.      [facility] next scheduled visit/plan: plan to change pouch to home system tomorrow afternoon    Stoma Type: Ileostomy  Location: RUQ  Mucosal Condition and Color: Moist, Red (seen through pouch)  Character of Output: Brown and Slightly Thick  Emptying Frequency: per nursing  Removed/Current Pouching System: 2 3/4\" ConvaTec moldable flat wafer, barrier ring, Natura Invisiclose pouch  Current Wearing Time: 2 Days    Recommendations: return to home system before discharge    Incision: no active bleeding noted from perineum  Drain: Morgan, LLQ, and Serosanguineous    Comments: Mrs. See is well-versed in pouching and will not need lessons.     Time Increment: 30 minutes    Brianna Verma, BSN, RN, CWOCN     "

## 2024-06-27 NOTE — PROGRESS NOTES
06/27/24 1123   Current Planned Discharge Disposition   Current Planned Discharge Disposition Home     Patient is not medically ready for discharge.  She is POD 2 for a proctectomy  PLAN: wean O2, pain control  DISP: home with spouse  ADOD: 2-3 days

## 2024-06-28 PROBLEM — Z87.19 HX OF ULCERATIVE COLITIS: Status: ACTIVE | Noted: 2024-06-28

## 2024-06-28 LAB
ANION GAP SERPL CALC-SCNC: 9 MMOL/L (ref 10–20)
BUN SERPL-MCNC: 10 MG/DL (ref 6–23)
CALCIUM SERPL-MCNC: 7.8 MG/DL (ref 8.6–10.3)
CHLORIDE SERPL-SCNC: 107 MMOL/L (ref 98–107)
CO2 SERPL-SCNC: 27 MMOL/L (ref 21–32)
CREAT SERPL-MCNC: 0.68 MG/DL (ref 0.5–1.05)
EGFRCR SERPLBLD CKD-EPI 2021: >90 ML/MIN/1.73M*2
ERYTHROCYTE [DISTWIDTH] IN BLOOD BY AUTOMATED COUNT: 13.9 % (ref 11.5–14.5)
GLUCOSE SERPL-MCNC: 121 MG/DL (ref 74–99)
HCT VFR BLD AUTO: 33.1 % (ref 36–46)
HGB BLD-MCNC: 10.2 G/DL (ref 12–16)
MCH RBC QN AUTO: 29.5 PG (ref 26–34)
MCHC RBC AUTO-ENTMCNC: 30.8 G/DL (ref 32–36)
MCV RBC AUTO: 96 FL (ref 80–100)
NRBC BLD-RTO: 0 /100 WBCS (ref 0–0)
PLATELET # BLD AUTO: 199 X10*3/UL (ref 150–450)
POTASSIUM SERPL-SCNC: 3.3 MMOL/L (ref 3.5–5.3)
RBC # BLD AUTO: 3.46 X10*6/UL (ref 4–5.2)
SODIUM SERPL-SCNC: 140 MMOL/L (ref 136–145)
WBC # BLD AUTO: 8.1 X10*3/UL (ref 4.4–11.3)

## 2024-06-28 PROCEDURE — 2500000002 HC RX 250 W HCPCS SELF ADMINISTERED DRUGS (ALT 637 FOR MEDICARE OP, ALT 636 FOR OP/ED)

## 2024-06-28 PROCEDURE — 80048 BASIC METABOLIC PNL TOTAL CA: CPT

## 2024-06-28 PROCEDURE — 36415 COLL VENOUS BLD VENIPUNCTURE: CPT

## 2024-06-28 PROCEDURE — 2500000004 HC RX 250 GENERAL PHARMACY W/ HCPCS (ALT 636 FOR OP/ED): Performed by: STUDENT IN AN ORGANIZED HEALTH CARE EDUCATION/TRAINING PROGRAM

## 2024-06-28 PROCEDURE — 2500000001 HC RX 250 WO HCPCS SELF ADMINISTERED DRUGS (ALT 637 FOR MEDICARE OP): Performed by: COLON & RECTAL SURGERY

## 2024-06-28 PROCEDURE — 9420000001 HC RT PATIENT EDUCATION 5 MIN

## 2024-06-28 PROCEDURE — 2500000002 HC RX 250 W HCPCS SELF ADMINISTERED DRUGS (ALT 637 FOR MEDICARE OP, ALT 636 FOR OP/ED): Performed by: COLON & RECTAL SURGERY

## 2024-06-28 PROCEDURE — 2500000004 HC RX 250 GENERAL PHARMACY W/ HCPCS (ALT 636 FOR OP/ED): Performed by: COLON & RECTAL SURGERY

## 2024-06-28 PROCEDURE — 2500000001 HC RX 250 WO HCPCS SELF ADMINISTERED DRUGS (ALT 637 FOR MEDICARE OP)

## 2024-06-28 PROCEDURE — 1100000001 HC PRIVATE ROOM DAILY

## 2024-06-28 PROCEDURE — 85027 COMPLETE CBC AUTOMATED: CPT

## 2024-06-28 RX ORDER — METHOCARBAMOL 500 MG/1
1000 TABLET, FILM COATED ORAL EVERY 6 HOURS SCHEDULED
Status: DISPENSED | OUTPATIENT
Start: 2024-06-28

## 2024-06-28 RX ORDER — POTASSIUM CHLORIDE 20 MEQ/1
40 TABLET, EXTENDED RELEASE ORAL ONCE
Status: COMPLETED | OUTPATIENT
Start: 2024-06-28 | End: 2024-06-28

## 2024-06-28 ASSESSMENT — COGNITIVE AND FUNCTIONAL STATUS - GENERAL
DRESSING REGULAR UPPER BODY CLOTHING: A LITTLE
DRESSING REGULAR LOWER BODY CLOTHING: A LITTLE
CLIMB 3 TO 5 STEPS WITH RAILING: A LOT
STANDING UP FROM CHAIR USING ARMS: A LOT
HELP NEEDED FOR BATHING: A LITTLE
WALKING IN HOSPITAL ROOM: A LITTLE
TOILETING: A LITTLE
DAILY ACTIVITIY SCORE: 19
MOVING FROM LYING ON BACK TO SITTING ON SIDE OF FLAT BED WITH BEDRAILS: A LITTLE
MOBILITY SCORE: 16
PERSONAL GROOMING: A LITTLE
WALKING IN HOSPITAL ROOM: A LOT
MOVING TO AND FROM BED TO CHAIR: A LITTLE
DRESSING REGULAR UPPER BODY CLOTHING: A LITTLE
EATING MEALS: A LITTLE
MOVING FROM LYING ON BACK TO SITTING ON SIDE OF FLAT BED WITH BEDRAILS: A LITTLE
TURNING FROM BACK TO SIDE WHILE IN FLAT BAD: A LITTLE
HELP NEEDED FOR BATHING: A LITTLE
MOBILITY SCORE: 15
STANDING UP FROM CHAIR USING ARMS: A LITTLE
PERSONAL GROOMING: A LITTLE
TOILETING: A LOT
DRESSING REGULAR LOWER BODY CLOTHING: A LITTLE
TURNING FROM BACK TO SIDE WHILE IN FLAT BAD: A LITTLE
DAILY ACTIVITIY SCORE: 17
CLIMB 3 TO 5 STEPS WITH RAILING: TOTAL
MOVING TO AND FROM BED TO CHAIR: A LITTLE

## 2024-06-28 ASSESSMENT — PAIN - FUNCTIONAL ASSESSMENT
PAIN_FUNCTIONAL_ASSESSMENT: 0-10
PAIN_FUNCTIONAL_ASSESSMENT: 0-10

## 2024-06-28 ASSESSMENT — PAIN SCALES - GENERAL
PAINLEVEL_OUTOF10: 3
PAINLEVEL_OUTOF10: 9
PAINLEVEL_OUTOF10: 6
PAINLEVEL_OUTOF10: 9
PAINLEVEL_OUTOF10: 5 - MODERATE PAIN

## 2024-06-28 ASSESSMENT — PAIN DESCRIPTION - LOCATION
LOCATION: ABDOMEN
LOCATION: BUTTOCKS

## 2024-06-28 ASSESSMENT — PAIN SCALES - PAIN ASSESSMENT IN ADVANCED DEMENTIA (PAINAD)
TOTALSCORE: MEDICATION (SEE MAR)
TOTALSCORE: MEDICATION (SEE MAR)

## 2024-06-28 NOTE — PROGRESS NOTES
"Maureen See is a 53 y.o. female on day 3 of admission presenting with Ulcerative colitis (Multi).    Subjective   Pt with pain - improving and better with robaxin, stoma still working, no nausea    Physical Exam  Gen - pale appearing with dark eyes  Abd - softly distended, appr tender, wounds CDI, drain with SSD, better   Extr - no edema   Perineum - bruising - incision approximated, minimal drainage.   Last Recorded Vitals  Blood pressure 132/75, pulse 87, temperature 36.2 °C (97.1 °F), temperature source Temporal, resp. rate 18, height 1.549 m (5' 1\"), weight 81.7 kg (180 lb 1.2 oz), SpO2 99%.  Intake/Output last 3 Shifts:  I/O last 3 completed shifts:  In: 1220 (14.9 mL/kg) [P.O.:1220]  Out: 2780 (34 mL/kg) [Urine:1850 (0.6 mL/kg/hr); Drains:180; Stool:750]  Weight: 81.7 kg     Relevant Results  Lab Results   Component Value Date    GLUCOSE 121 (H) 06/28/2024    CALCIUM 7.8 (L) 06/28/2024     06/28/2024    K 3.3 (L) 06/28/2024    CO2 27 06/28/2024     06/28/2024    BUN 10 06/28/2024    CREATININE 0.68 06/28/2024     Lab Results   Component Value Date    WBC 8.1 06/28/2024    HGB 10.2 (L) 06/28/2024    HCT 33.1 (L) 06/28/2024    MCV 96 06/28/2024     06/28/2024           Assessment/Plan   Principal Problem:    Ulcerative colitis (Multi)  Active Problems:    Ulcerative colitis without complications (Multi)    Impression - POD # 3 s/p CP   Plan   OOB/IS   Lovenox/SCD's  and will need for home  Pain meds and robaxin - will need for home   Hopefully ready for home tomorrow likely the drain will be ready to com out.          I spent 10 minutes in the professional and overall care of this patient.      Shantel De Leon MD      "

## 2024-06-28 NOTE — CARE PLAN
The clinical goals for the shift include ambulate 3 times within this shift    Problem: Pain  Goal: Takes deep breaths with improved pain control throughout the shift  Outcome: Progressing  Goal: Turns in bed with improved pain control throughout the shift  Outcome: Progressing  Goal: Walks with improved pain control throughout the shift  Outcome: Progressing  Goal: Performs ADL's with improved pain control throughout shift  Outcome: Progressing  Goal: Free from opioid side effects throughout the shift  Outcome: Progressing  Goal: Free from acute confusion related to pain meds throughout the shift  Outcome: Progressing     Problem: Skin  Goal: Participates in plan/prevention/treatment measures  Outcome: Progressing  Flowsheets (Taken 6/28/2024 1125)  Participates in plan/prevention/treatment measures: Increase activity/out of bed for meals  Note: Patient ambulated around the halls  Goal: Prevent/manage excess moisture  Outcome: Progressing  Goal: Prevent/minimize sheer/friction injuries  Outcome: Progressing  Goal: Promote/optimize nutrition  Outcome: Progressing  Goal: Promote skin healing  Outcome: Progressing

## 2024-06-29 ENCOUNTER — PHARMACY VISIT (OUTPATIENT)
Dept: PHARMACY | Facility: CLINIC | Age: 54
End: 2024-06-29
Payer: COMMERCIAL

## 2024-06-29 LAB
ANION GAP SERPL CALC-SCNC: 10 MMOL/L (ref 10–20)
BUN SERPL-MCNC: 7 MG/DL (ref 6–23)
CALCIUM SERPL-MCNC: 8.3 MG/DL (ref 8.6–10.3)
CHLORIDE SERPL-SCNC: 105 MMOL/L (ref 98–107)
CO2 SERPL-SCNC: 26 MMOL/L (ref 21–32)
CREAT SERPL-MCNC: 0.67 MG/DL (ref 0.5–1.05)
EGFRCR SERPLBLD CKD-EPI 2021: >90 ML/MIN/1.73M*2
ERYTHROCYTE [DISTWIDTH] IN BLOOD BY AUTOMATED COUNT: 13.8 % (ref 11.5–14.5)
GLUCOSE SERPL-MCNC: 168 MG/DL (ref 74–99)
HCT VFR BLD AUTO: 32.1 % (ref 36–46)
HGB BLD-MCNC: 10.7 G/DL (ref 12–16)
MCH RBC QN AUTO: 29.6 PG (ref 26–34)
MCHC RBC AUTO-ENTMCNC: 33.3 G/DL (ref 32–36)
MCV RBC AUTO: 89 FL (ref 80–100)
NRBC BLD-RTO: 0 /100 WBCS (ref 0–0)
PLATELET # BLD AUTO: 228 X10*3/UL (ref 150–450)
POTASSIUM SERPL-SCNC: 3.1 MMOL/L (ref 3.5–5.3)
RBC # BLD AUTO: 3.62 X10*6/UL (ref 4–5.2)
SODIUM SERPL-SCNC: 138 MMOL/L (ref 136–145)
WBC # BLD AUTO: 9.8 X10*3/UL (ref 4.4–11.3)

## 2024-06-29 PROCEDURE — 2500000004 HC RX 250 GENERAL PHARMACY W/ HCPCS (ALT 636 FOR OP/ED): Performed by: COLON & RECTAL SURGERY

## 2024-06-29 PROCEDURE — 2500000001 HC RX 250 WO HCPCS SELF ADMINISTERED DRUGS (ALT 637 FOR MEDICARE OP)

## 2024-06-29 PROCEDURE — 85027 COMPLETE CBC AUTOMATED: CPT

## 2024-06-29 PROCEDURE — 2500000002 HC RX 250 W HCPCS SELF ADMINISTERED DRUGS (ALT 637 FOR MEDICARE OP, ALT 636 FOR OP/ED): Performed by: STUDENT IN AN ORGANIZED HEALTH CARE EDUCATION/TRAINING PROGRAM

## 2024-06-29 PROCEDURE — 1100000001 HC PRIVATE ROOM DAILY

## 2024-06-29 PROCEDURE — RXMED WILLOW AMBULATORY MEDICATION CHARGE

## 2024-06-29 PROCEDURE — 2500000005 HC RX 250 GENERAL PHARMACY W/O HCPCS

## 2024-06-29 PROCEDURE — 80051 ELECTROLYTE PANEL: CPT

## 2024-06-29 PROCEDURE — 2500000002 HC RX 250 W HCPCS SELF ADMINISTERED DRUGS (ALT 637 FOR MEDICARE OP, ALT 636 FOR OP/ED): Performed by: COLON & RECTAL SURGERY

## 2024-06-29 PROCEDURE — 2500000001 HC RX 250 WO HCPCS SELF ADMINISTERED DRUGS (ALT 637 FOR MEDICARE OP): Performed by: COLON & RECTAL SURGERY

## 2024-06-29 PROCEDURE — 36415 COLL VENOUS BLD VENIPUNCTURE: CPT

## 2024-06-29 RX ORDER — LIDOCAINE 560 MG/1
1 PATCH PERCUTANEOUS; TOPICAL; TRANSDERMAL DAILY
Status: DISPENSED | OUTPATIENT
Start: 2024-06-29

## 2024-06-29 RX ORDER — POTASSIUM CHLORIDE 20 MEQ/1
40 TABLET, EXTENDED RELEASE ORAL ONCE
Status: COMPLETED | OUTPATIENT
Start: 2024-06-29 | End: 2024-06-29

## 2024-06-29 RX ORDER — METHOCARBAMOL 500 MG/1
1000 TABLET, FILM COATED ORAL 4 TIMES DAILY PRN
Qty: 56 TABLET | Refills: 0 | Status: SHIPPED | OUTPATIENT
Start: 2024-06-29 | End: 2024-07-06

## 2024-06-29 RX ORDER — ONDANSETRON 4 MG/1
4 TABLET, FILM COATED ORAL EVERY 6 HOURS PRN
Qty: 20 TABLET | Refills: 0 | Status: SHIPPED | OUTPATIENT
Start: 2024-06-29

## 2024-06-29 RX ORDER — ENOXAPARIN SODIUM 100 MG/ML
40 INJECTION SUBCUTANEOUS DAILY
Qty: 26 EACH | Refills: 0 | Status: SHIPPED | OUTPATIENT
Start: 2024-06-29 | End: 2024-07-25

## 2024-06-29 RX ORDER — KETOROLAC TROMETHAMINE 10 MG/1
10 TABLET, FILM COATED ORAL EVERY 6 HOURS PRN
Status: DISPENSED | OUTPATIENT
Start: 2024-06-29 | End: 2024-07-01

## 2024-06-29 ASSESSMENT — COGNITIVE AND FUNCTIONAL STATUS - GENERAL
WALKING IN HOSPITAL ROOM: A LOT
CLIMB 3 TO 5 STEPS WITH RAILING: TOTAL
HELP NEEDED FOR BATHING: A LITTLE
PERSONAL GROOMING: A LITTLE
DRESSING REGULAR UPPER BODY CLOTHING: A LITTLE
STANDING UP FROM CHAIR USING ARMS: A LITTLE
MOVING FROM LYING ON BACK TO SITTING ON SIDE OF FLAT BED WITH BEDRAILS: A LITTLE
MOBILITY SCORE: 15
TOILETING: A LITTLE
TURNING FROM BACK TO SIDE WHILE IN FLAT BAD: A LITTLE
DAILY ACTIVITIY SCORE: 19
DRESSING REGULAR LOWER BODY CLOTHING: A LITTLE
MOVING TO AND FROM BED TO CHAIR: A LITTLE

## 2024-06-29 ASSESSMENT — PAIN SCALES - PAIN ASSESSMENT IN ADVANCED DEMENTIA (PAINAD)
CONSOLABILITY: DISTRACTED OR REASSURED BY VOICE/TOUCH
CONSOLABILITY: NO NEED TO CONSOLE
BREATHING: NORMAL
FACIALEXPRESSION: FACIAL GRIMACING
BODYLANGUAGE: RELAXED
TOTALSCORE: MEDICATION (SEE MAR)
NEGVOCALIZATION: OCCASIONAL MOAN/GROAN, LOW SPEECH, NEGATIVE/DISAPPROVING QUALITY
TOTALSCORE: 3

## 2024-06-29 ASSESSMENT — PAIN DESCRIPTION - DESCRIPTORS
DESCRIPTORS: SORE
DESCRIPTORS: SORE

## 2024-06-29 ASSESSMENT — PAIN SCALES - GENERAL
PAINLEVEL_OUTOF10: 7
PAINLEVEL_OUTOF10: 9
PAINLEVEL_OUTOF10: 8
PAINLEVEL_OUTOF10: 8
PAINLEVEL_OUTOF10: 7
PAINLEVEL_OUTOF10: 7
PAINLEVEL_OUTOF10: 6
PAINLEVEL_OUTOF10: 9

## 2024-06-29 ASSESSMENT — PAIN - FUNCTIONAL ASSESSMENT
PAIN_FUNCTIONAL_ASSESSMENT: 0-10

## 2024-06-29 ASSESSMENT — PAIN SCALES - WONG BAKER
WONGBAKER_NUMERICALRESPONSE: HURTS LITTLE MORE
WONGBAKER_NUMERICALRESPONSE: HURTS LITTLE MORE
WONGBAKER_NUMERICALRESPONSE: HURTS LITTLE BIT
WONGBAKER_NUMERICALRESPONSE: HURTS LITTLE MORE

## 2024-06-29 ASSESSMENT — PAIN DESCRIPTION - LOCATION: LOCATION: RECTUM

## 2024-06-29 NOTE — CARE PLAN
Problem: Pain  Goal: Takes deep breaths with improved pain control throughout the shift  6/29/2024 1201 by Carmelo Funes RN  Outcome: Progressing  6/29/2024 1154 by Carmelo Funes RN  Outcome: Progressing  Goal: Turns in bed with improved pain control throughout the shift  6/29/2024 1201 by Carmelo Funes RN  Outcome: Progressing  6/29/2024 1154 by Carmelo Funes RN  Outcome: Progressing  Goal: Walks with improved pain control throughout the shift  6/29/2024 1201 by Carmelo Funes RN  Outcome: Progressing  6/29/2024 1154 by Carmelo Funes RN  Outcome: Progressing  Goal: Performs ADL's with improved pain control throughout shift  6/29/2024 1201 by Carmelo Funes RN  Outcome: Progressing  6/29/2024 1154 by Carmelo Funes RN  Outcome: Progressing  Goal: Participates in PT with improved pain control throughout the shift  6/29/2024 1201 by Carmelo Funes RN  Outcome: Progressing  6/29/2024 1154 by Carmelo Funes RN  Outcome: Progressing  Goal: Free from opioid side effects throughout the shift  6/29/2024 1201 by Carmelo Funes RN  Outcome: Progressing  6/29/2024 1154 by Carmelo Funes RN  Outcome: Progressing  Goal: Free from acute confusion related to pain meds throughout the shift  6/29/2024 1201 by Carmelo Funes RN  Outcome: Progressing  6/29/2024 1154 by Carmelo Funes RN  Outcome: Progressing     Problem: Skin  Goal: Decreased wound size/increased tissue granulation at next dressing change  6/29/2024 1201 by Carmelo Funes RN  Outcome: Progressing  6/29/2024 1154 by Carmelo Funes RN  Outcome: Progressing  Goal: Participates in plan/prevention/treatment measures  6/29/2024 1201 by Carmelo Funes RN  Outcome: Progressing  6/29/2024 1154 by Carmelo Funes RN  Outcome: Progressing  Goal: Prevent/manage excess moisture  6/29/2024 1201 by Carmelo Funes RN  Outcome: Progressing  6/29/2024 1154 by Carmelo Funes RN  Outcome: Progressing  Goal: Prevent/minimize sheer/friction injuries  6/29/2024 1201 by Carmelo Funes RN  Outcome: Progressing  6/29/2024 1154 by Carmelo Funes RN  Outcome:  Progressing  Goal: Promote/optimize nutrition  6/29/2024 1201 by Carmelo Funes RN  Outcome: Progressing  6/29/2024 1154 by Carmelo Funes RN  Outcome: Progressing  Goal: Promote skin healing  6/29/2024 1201 by Carmelo Funes RN  Outcome: Progressing  6/29/2024 1154 by Carmelo Funes RN  Outcome: Progressing     Problem: Pain - Adult  Goal: Verbalizes/displays adequate comfort level or baseline comfort level  6/29/2024 1201 by Carmelo Funes RN  Outcome: Progressing  6/29/2024 1154 by Carmelo Funes RN  Outcome: Progressing     Problem: Safety - Adult  Goal: Free from fall injury  6/29/2024 1201 by Carmelo Funes RN  Outcome: Progressing  6/29/2024 1154 by Carmelo Funes RN  Outcome: Progressing     Problem: Discharge Planning  Goal: Discharge to home or other facility with appropriate resources  6/29/2024 1201 by Carmelo Funes RN  Outcome: Progressing  6/29/2024 1154 by Carmelo Funes RN  Outcome: Progressing     Problem: Chronic Conditions and Co-morbidities  Goal: Patient's chronic conditions and co-morbidity symptoms are monitored and maintained or improved  6/29/2024 1201 by Carmelo Funes RN  Outcome: Progressing  6/29/2024 1154 by Carmelo Funes RN  Outcome: Progressing   The patient's goals for the shift include      The clinical goals for the shift include Control pain and maintain safety

## 2024-06-29 NOTE — CARE PLAN
The patient's goals for the shift include      The clinical goals for the shift include Control pain and maintain safety      Problem: Pain  Goal: Takes deep breaths with improved pain control throughout the shift  6/29/2024 1454 by Carmelo Funes RN  Outcome: Progressing  6/29/2024 1203 by Carmelo Funes RN  Outcome: Progressing  6/29/2024 1201 by Carmelo Funes RN  Outcome: Progressing  6/29/2024 1154 by Carmelo Funes RN  Outcome: Progressing  Goal: Turns in bed with improved pain control throughout the shift  6/29/2024 1454 by Carmelo Funes RN  Outcome: Progressing  6/29/2024 1203 by Carmelo Funes RN  Outcome: Progressing  6/29/2024 1201 by Carmelo Funes RN  Outcome: Progressing  6/29/2024 1154 by Carmelo Funes RN  Outcome: Progressing  Goal: Walks with improved pain control throughout the shift  6/29/2024 1454 by Carmelo Funes RN  Outcome: Progressing  6/29/2024 1203 by Carmelo Funes RN  Outcome: Progressing  6/29/2024 1201 by Carmelo Funes RN  Outcome: Progressing  6/29/2024 1154 by Carmelo Funes RN  Outcome: Progressing  Goal: Performs ADL's with improved pain control throughout shift  6/29/2024 1454 by Carmelo Funes RN  Outcome: Progressing  6/29/2024 1203 by Carmelo Funes RN  Outcome: Progressing  6/29/2024 1201 by Carmelo Funes RN  Outcome: Progressing  6/29/2024 1154 by Carmelo Funes RN  Outcome: Progressing  Goal: Participates in PT with improved pain control throughout the shift  6/29/2024 1454 by Carmelo Funes RN  Outcome: Progressing  6/29/2024 1203 by Carmelo Funes RN  Outcome: Progressing  6/29/2024 1201 by Carmelo Funes RN  Outcome: Progressing  6/29/2024 1154 by Carmelo Funes RN  Outcome: Progressing  Goal: Free from opioid side effects throughout the shift  6/29/2024 1454 by Carmelo Funes RN  Outcome: Progressing  6/29/2024 1203 by Carmelo Funes RN  Outcome: Progressing  6/29/2024 1201 by Carmelo Funes RN  Outcome: Progressing  6/29/2024 1154 by Carmelo Funes RN  Outcome: Progressing  Goal: Free from acute confusion related to pain meds throughout the shift  6/29/2024  1454 by Carmelo Funes RN  Outcome: Progressing  6/29/2024 1203 by Carmelo Funes RN  Outcome: Progressing  6/29/2024 1201 by Carmelo Funes RN  Outcome: Progressing  6/29/2024 1154 by Carmelo Funes RN  Outcome: Progressing     Problem: Skin  Goal: Decreased wound size/increased tissue granulation at next dressing change  6/29/2024 1454 by Carmelo Funes RN  Outcome: Progressing  6/29/2024 1203 by Carmelo Funes RN  Outcome: Progressing  6/29/2024 1201 by Carmelo Funes RN  Outcome: Progressing  6/29/2024 1154 by Carmelo Funes RN  Outcome: Progressing  Goal: Participates in plan/prevention/treatment measures  6/29/2024 1454 by Carmelo Funes RN  Outcome: Progressing  6/29/2024 1203 by Carmelo Funes RN  Outcome: Progressing  6/29/2024 1201 by Carmelo Funes RN  Outcome: Progressing  6/29/2024 1154 by Carmelo Funes RN  Outcome: Progressing  Goal: Prevent/manage excess moisture  6/29/2024 1454 by Carmelo Funes RN  Outcome: Progressing  6/29/2024 1203 by Carmelo Funes RN  Outcome: Progressing  6/29/2024 1201 by Carmelo Funes RN  Outcome: Progressing  6/29/2024 1154 by Carmelo Funes RN  Outcome: Progressing  Goal: Prevent/minimize sheer/friction injuries  6/29/2024 1454 by Carmelo Funes, JAYJAY  Outcome: Progressing  6/29/2024 1203 by Carmelo Funes RN  Outcome: Progressing  6/29/2024 1201 by Carmelo Funes RN  Outcome: Progressing  6/29/2024 1154 by Carmelo Funes RN  Outcome: Progressing  Goal: Promote/optimize nutrition  6/29/2024 1454 by Carmelo Funes RN  Outcome: Progressing  6/29/2024 1203 by Carmelo Funes RN  Outcome: Progressing  6/29/2024 1201 by Carmelo Funes RN  Outcome: Progressing  6/29/2024 1154 by Carmelo Funes RN  Outcome: Progressing  Goal: Promote skin healing  6/29/2024 1454 by Carmelo Funes RN  Outcome: Progressing  6/29/2024 1203 by Carmelo Funes RN  Outcome: Progressing  6/29/2024 1201 by Carmelo Funes RN  Outcome: Progressing  6/29/2024 1154 by Carmelo Funes RN  Outcome: Progressing     Problem: Pain - Adult  Goal: Verbalizes/displays adequate comfort level or baseline comfort level  6/29/2024  1454 by Carmelo Funes RN  Outcome: Progressing  6/29/2024 1203 by Carmelo Funes RN  Outcome: Progressing  6/29/2024 1201 by Carmelo Funes RN  Outcome: Progressing  6/29/2024 1154 by Carmelo Funes RN  Outcome: Progressing     Problem: Safety - Adult  Goal: Free from fall injury  6/29/2024 1454 by Carmelo Funes RN  Outcome: Progressing  6/29/2024 1203 by Carmelo Funes RN  Outcome: Progressing  6/29/2024 1201 by Carmelo Funes RN  Outcome: Progressing  6/29/2024 1154 by Carmelo Funes RN  Outcome: Progressing     Problem: Discharge Planning  Goal: Discharge to home or other facility with appropriate resources  6/29/2024 1454 by Carmelo Funes RN  Outcome: Progressing  6/29/2024 1203 by Carmelo Funes RN  Outcome: Progressing  6/29/2024 1201 by Carmelo Funes RN  Outcome: Progressing  6/29/2024 1154 by Carmelo Funes RN  Outcome: Progressing     Problem: Chronic Conditions and Co-morbidities  Goal: Patient's chronic conditions and co-morbidity symptoms are monitored and maintained or improved  6/29/2024 1454 by Carmelo Funes RN  Outcome: Progressing  6/29/2024 1203 by Carmelo Funes RN  Outcome: Progressing  6/29/2024 1201 by Carmelo Funes RN  Outcome: Progressing  6/29/2024 1154 by Carmelo Funes RN  Outcome: Progressing

## 2024-06-29 NOTE — CARE PLAN
The patient's goals for the shift include      The clinical goals for the shift include Control pain and maintain safety      Problem: Pain  Goal: Takes deep breaths with improved pain control throughout the shift  Outcome: Progressing  Goal: Turns in bed with improved pain control throughout the shift  Outcome: Progressing  Goal: Walks with improved pain control throughout the shift  Outcome: Progressing  Goal: Performs ADL's with improved pain control throughout shift  Outcome: Progressing  Goal: Participates in PT with improved pain control throughout the shift  Outcome: Progressing  Goal: Free from opioid side effects throughout the shift  Outcome: Progressing  Goal: Free from acute confusion related to pain meds throughout the shift  Outcome: Progressing     Problem: Skin  Goal: Decreased wound size/increased tissue granulation at next dressing change  Outcome: Progressing  Goal: Participates in plan/prevention/treatment measures  Outcome: Progressing  Goal: Prevent/manage excess moisture  Outcome: Progressing  Goal: Prevent/minimize sheer/friction injuries  Outcome: Progressing  Goal: Promote/optimize nutrition  Outcome: Progressing  Goal: Promote skin healing  Outcome: Progressing     Problem: Pain - Adult  Goal: Verbalizes/displays adequate comfort level or baseline comfort level  Outcome: Progressing     Problem: Safety - Adult  Goal: Free from fall injury  Outcome: Progressing     Problem: Discharge Planning  Goal: Discharge to home or other facility with appropriate resources  Outcome: Progressing     Problem: Chronic Conditions and Co-morbidities  Goal: Patient's chronic conditions and co-morbidity symptoms are monitored and maintained or improved  Outcome: Progressing

## 2024-06-29 NOTE — PROGRESS NOTES
"Maureen See is a 53 y.o. female on day 4 of admission presenting with Ulcerative colitis (Multi).    Subjective   Reports unmanageable pain this morning.   Tolerating regular diet. Denies N/V, fever, chills, SOB.        Objective     PE:  Constitutional: cooperative, tearful  Eyes: PERRL, clear sclera   ENMT: Moist mucous membranes  Head/Neck: Neck supple, no JVD  Cardiovascular: RRR. 2+ equal pulses of the distal extremities.  Respiratory/Thorax: CTAB. Good symmetric chest expansion. On RA  Gastrointestinal: Abdomen slightly distended, soft, appropriately tender, no peritoneal signs. Stoma beefy red, moist, producing green loose stool, and gas, well pouched (200 ml/24 hours). Laparotomy sites c/d/i, well approximate with Dermabond, no erythema or drainage. DILAN with LUQ with serosang output (90ml/24 hours) drain stripped  Genitourinary: Voiding independently   Musculoskeletal: ROM intact, no joint swelling, normal strength  Extremities: No peripheral edema  Neurological: A&Ox3, No focal deficits   Psychological: tearful and anxious  Skin: Warm and dry      Last Recorded Vitals  Blood pressure 128/80, pulse 82, temperature 36.9 °C (98.4 °F), temperature source Temporal, resp. rate 16, height 1.549 m (5' 1\"), weight 82 kg (180 lb 12.4 oz), SpO2 94%.  Intake/Output last 3 Shifts:  I/O last 3 completed shifts:  In: 976 (11.9 mL/kg) [P.O.:800; I.V.:176 (2.1 mL/kg)]  Out: 2135 (26 mL/kg) [Urine:1550 (0.5 mL/kg/hr); Drains:160; Stool:425]  Weight: 82 kg     Relevant Results  Scheduled medications  acetaminophen, 650 mg, oral, q6h  enoxaparin, 40 mg, subcutaneous, q24h  escitalopram, 20 mg, oral, Nightly  pantoprazole, 40 mg, oral, Daily before breakfast   Or  esomeprazole, 40 mg, nasoduodenal tube, Daily before breakfast   Or  pantoprazole, 40 mg, intravenous, Daily before breakfast  gabapentin, 300 mg, oral, TID  lidocaine, 1 patch, transdermal, Daily  methocarbamol, 1,000 mg, oral, q6h AMELIA  oxygen, , inhalation, " Continuous - 02/gases  rosuvastatin, 20 mg, oral, Daily  tamsulosin, 0.4 mg, oral, Daily      Continuous medications     PRN medications  PRN medications: HYDROmorphone, ketorolac, naloxone, ondansetron ODT **OR** ondansetron, oxyCODONE, oxyCODONE  No results found for this or any previous visit (from the past 24 hour(s)).  No orders to display            Assessment/Plan   Principal Problem:    Ulcerative colitis (Multi)  Active Problems:    Ulcerative colitis without complications (Multi)    Maureen See is a 53 y.o. female on day 4 of admission presenting with Ulcerative colitis (Multi). She is POD 4 from robot assisted completion proctectomy with Dr. De Leon. Intraoperative findings showed no adhesions - staple line at the peritoneal reflection.    A/P: POD 4    Neuro: Moderate to severe post operative pain  Hx: anxiety, depression   - Scheduled Tylenol, gabapentin, and robaxin. PRN oxycodone. Added lidocaine patch and PO toradol (no IV access) to regimen today.   - continue home lexapro  - OOB/ ambulate 5x per day     CV: RRR, VSS. -143  Hx: Familial hypercholesterolemia  - VS every 4 hours   - home rosuvastatin     Pulm: CTAB, On RA  - IS every hour while awake   - Pulse ox every 4 hours with VS     GI: Abdomen slightly distended, soft. Stoma beefy red, moist, producing green loose stool, and gas, well pouched (200 ml/24 hours). Laparotomy sites c/d/i, well approximate with Dermabond, no erythema or drainage. DILAN with LUQ with serosang output (90ml/24 hours) drain stripped. Tolerating diet. +BS  Hx: GERD, IBS, UC, fatty liver  - Continue regular diet  - enterostomal RN following   - DILAN drain care  - PRN antiemetic   - chewing gum   - continue home esomeprazole   - Bowel regimen:     : Voiding without difficulty   - continue flomax  - Monitor I&Os     HEME: H&H stable. Pending repeat labs   Hx: JOELLE  - DVT Proph: SCDs/ ambulate/ lovenox (will go home with)     ID: Afebrile  - no current s/s  infection    Dispo: Pending labs. Pain needs to be more manageable prior to dc. Hope to dc home with lovenox and pain medication tomorrow.          I spent 35 minutes in the professional and overall care of this patient.      Nellie Lucas, APRN-CNP

## 2024-06-29 NOTE — CARE PLAN
Problem: Pain  Goal: Takes deep breaths with improved pain control throughout the shift  6/29/2024 1203 by Carmelo Funes RN  Outcome: Progressing  6/29/2024 1201 by Carmelo Funes RN  Outcome: Progressing  6/29/2024 1154 by Carmelo Funes RN  Outcome: Progressing  Goal: Turns in bed with improved pain control throughout the shift  6/29/2024 1203 by Carmelo Funes RN  Outcome: Progressing  6/29/2024 1201 by Carmelo Funes RN  Outcome: Progressing  6/29/2024 1154 by Carmelo Funes RN  Outcome: Progressing  Goal: Walks with improved pain control throughout the shift  6/29/2024 1203 by Carmelo Funes RN  Outcome: Progressing  6/29/2024 1201 by Carmelo Funes RN  Outcome: Progressing  6/29/2024 1154 by Carmelo Funes RN  Outcome: Progressing  Goal: Performs ADL's with improved pain control throughout shift  6/29/2024 1203 by Carmelo Funes RN  Outcome: Progressing  6/29/2024 1201 by Carmelo Funes RN  Outcome: Progressing  6/29/2024 1154 by Carmelo Funes RN  Outcome: Progressing  Goal: Participates in PT with improved pain control throughout the shift  6/29/2024 1203 by Carmelo Funes RN  Outcome: Progressing  6/29/2024 1201 by Carmelo Funes RN  Outcome: Progressing  6/29/2024 1154 by Carmelo Funes RN  Outcome: Progressing  Goal: Free from opioid side effects throughout the shift  6/29/2024 1203 by Carmelo Funes RN  Outcome: Progressing  6/29/2024 1201 by Carmelo Funes RN  Outcome: Progressing  6/29/2024 1154 by Carmelo Funes RN  Outcome: Progressing  Goal: Free from acute confusion related to pain meds throughout the shift  6/29/2024 1203 by Carmelo Funes RN  Outcome: Progressing  6/29/2024 1201 by Carmelo Funes RN  Outcome: Progressing  6/29/2024 1154 by Carmelo Funes RN  Outcome: Progressing     Problem: Skin  Goal: Decreased wound size/increased tissue granulation at next dressing change  6/29/2024 1203 by Carmelo Funes RN  Outcome: Progressing  6/29/2024 1201 by Carmelo Funes RN  Outcome: Progressing  6/29/2024 1154 by Carmelo Funes, RN  Outcome: Progressing  Goal: Participates in  plan/prevention/treatment measures  6/29/2024 1203 by Carmelo Funes RN  Outcome: Progressing  6/29/2024 1201 by Carmelo Funes RN  Outcome: Progressing  6/29/2024 1154 by Carmelo Funes RN  Outcome: Progressing  Goal: Prevent/manage excess moisture  6/29/2024 1203 by Carmelo Funes RN  Outcome: Progressing  6/29/2024 1201 by Carmelo Funes RN  Outcome: Progressing  6/29/2024 1154 by Carmelo Funes RN  Outcome: Progressing  Goal: Prevent/minimize sheer/friction injuries  6/29/2024 1203 by Carmelo Funes RN  Outcome: Progressing  6/29/2024 1201 by Carmelo Funes RN  Outcome: Progressing  6/29/2024 1154 by Carmelo Funes RN  Outcome: Progressing  Goal: Promote/optimize nutrition  6/29/2024 1203 by Carmelo Funes RN  Outcome: Progressing  6/29/2024 1201 by Carmelo Funes RN  Outcome: Progressing  6/29/2024 1154 by Carmelo Funes RN  Outcome: Progressing  Goal: Promote skin healing  6/29/2024 1203 by Carmelo Funes RN  Outcome: Progressing  6/29/2024 1201 by Carmelo Funes RN  Outcome: Progressing  6/29/2024 1154 by Carmelo Funes RN  Outcome: Progressing     Problem: Pain - Adult  Goal: Verbalizes/displays adequate comfort level or baseline comfort level  6/29/2024 1203 by Carmelo Funes RN  Outcome: Progressing  6/29/2024 1201 by Carmelo Funes RN  Outcome: Progressing  6/29/2024 1154 by Carmelo Funes RN  Outcome: Progressing     Problem: Safety - Adult  Goal: Free from fall injury  6/29/2024 1203 by Carmelo Funes RN  Outcome: Progressing  6/29/2024 1201 by Carmelo Funes RN  Outcome: Progressing  6/29/2024 1154 by Carmelo Funes RN  Outcome: Progressing     Problem: Discharge Planning  Goal: Discharge to home or other facility with appropriate resources  6/29/2024 1203 by Carmelo Funes RN  Outcome: Progressing  6/29/2024 1201 by Carmelo Funes RN  Outcome: Progressing  6/29/2024 1154 by Carmelo Funes RN  Outcome: Progressing     Problem: Chronic Conditions and Co-morbidities  Goal: Patient's chronic conditions and co-morbidity symptoms are monitored and maintained or improved  6/29/2024 1203 by Carmelo Funes,  RN  Outcome: Progressing  6/29/2024 1201 by Carmelo Funes RN  Outcome: Progressing  6/29/2024 1154 by Carmelo Funes RN  Outcome: Progressing   The patient's goals for the shift include      The clinical goals for the shift include Control pain and maintain safety

## 2024-06-30 LAB
ANION GAP SERPL CALC-SCNC: 11 MMOL/L (ref 10–20)
BUN SERPL-MCNC: 7 MG/DL (ref 6–23)
CALCIUM SERPL-MCNC: 8.5 MG/DL (ref 8.6–10.3)
CHLORIDE SERPL-SCNC: 105 MMOL/L (ref 98–107)
CO2 SERPL-SCNC: 27 MMOL/L (ref 21–32)
CREAT SERPL-MCNC: 0.73 MG/DL (ref 0.5–1.05)
EGFRCR SERPLBLD CKD-EPI 2021: >90 ML/MIN/1.73M*2
ERYTHROCYTE [DISTWIDTH] IN BLOOD BY AUTOMATED COUNT: 13.8 % (ref 11.5–14.5)
GLUCOSE SERPL-MCNC: 152 MG/DL (ref 74–99)
HCT VFR BLD AUTO: 32.1 % (ref 36–46)
HGB BLD-MCNC: 10.5 G/DL (ref 12–16)
MCH RBC QN AUTO: 29.4 PG (ref 26–34)
MCHC RBC AUTO-ENTMCNC: 32.7 G/DL (ref 32–36)
MCV RBC AUTO: 90 FL (ref 80–100)
NRBC BLD-RTO: 0 /100 WBCS (ref 0–0)
PLATELET # BLD AUTO: 226 X10*3/UL (ref 150–450)
POTASSIUM SERPL-SCNC: 3.9 MMOL/L (ref 3.5–5.3)
RBC # BLD AUTO: 3.57 X10*6/UL (ref 4–5.2)
SODIUM SERPL-SCNC: 139 MMOL/L (ref 136–145)
WBC # BLD AUTO: 10.3 X10*3/UL (ref 4.4–11.3)

## 2024-06-30 PROCEDURE — 36415 COLL VENOUS BLD VENIPUNCTURE: CPT

## 2024-06-30 PROCEDURE — 1100000001 HC PRIVATE ROOM DAILY

## 2024-06-30 PROCEDURE — 2500000004 HC RX 250 GENERAL PHARMACY W/ HCPCS (ALT 636 FOR OP/ED): Performed by: COLON & RECTAL SURGERY

## 2024-06-30 PROCEDURE — 2500000001 HC RX 250 WO HCPCS SELF ADMINISTERED DRUGS (ALT 637 FOR MEDICARE OP)

## 2024-06-30 PROCEDURE — 2500000001 HC RX 250 WO HCPCS SELF ADMINISTERED DRUGS (ALT 637 FOR MEDICARE OP): Performed by: COLON & RECTAL SURGERY

## 2024-06-30 PROCEDURE — 2500000002 HC RX 250 W HCPCS SELF ADMINISTERED DRUGS (ALT 637 FOR MEDICARE OP, ALT 636 FOR OP/ED): Performed by: COLON & RECTAL SURGERY

## 2024-06-30 PROCEDURE — 80048 BASIC METABOLIC PNL TOTAL CA: CPT

## 2024-06-30 PROCEDURE — 85027 COMPLETE CBC AUTOMATED: CPT

## 2024-06-30 ASSESSMENT — PAIN SCALES - PAIN ASSESSMENT IN ADVANCED DEMENTIA (PAINAD): TOTALSCORE: MEDICATION (SEE MAR)

## 2024-06-30 ASSESSMENT — PAIN DESCRIPTION - ORIENTATION
ORIENTATION: LOWER
ORIENTATION: LOWER

## 2024-06-30 ASSESSMENT — PAIN DESCRIPTION - LOCATION
LOCATION: RECTUM

## 2024-06-30 ASSESSMENT — PAIN SCALES - GENERAL
PAINLEVEL_OUTOF10: 7
PAINLEVEL_OUTOF10: 4
PAINLEVEL_OUTOF10: 4
PAINLEVEL_OUTOF10: 8
PAINLEVEL_OUTOF10: 7
PAINLEVEL_OUTOF10: 4
PAINLEVEL_OUTOF10: 5 - MODERATE PAIN
PAINLEVEL_OUTOF10: 7

## 2024-06-30 ASSESSMENT — PAIN - FUNCTIONAL ASSESSMENT
PAIN_FUNCTIONAL_ASSESSMENT: 0-10

## 2024-06-30 ASSESSMENT — PAIN DESCRIPTION - DESCRIPTORS: DESCRIPTORS: SHARP

## 2024-06-30 NOTE — PROGRESS NOTES
"Maureen See is a 53 y.o. female on day 5 of admission presenting with Ulcerative colitis (Multi).    Subjective   Still having some rectal pain. Stoma functioning well. Tolerating regular diet. Denies N/V, fever, chills, SOB.        Objective     PE:  Constitutional: cooperative, tearful  Eyes: PERRL, clear sclera   ENMT: Moist mucous membranes  Head/Neck: Neck supple, no JVD  Cardiovascular: RRR. 2+ equal pulses of the distal extremities.  Respiratory/Thorax: CTAB. Good symmetric chest expansion. On RA  Gastrointestinal: Abdomen slightly distended, soft, appropriately tender. Stoma beefy red, moist, producing green loose stool, and gas, well pouched (75 ml/24 hours). Laparotomy sites c/d/i, well approximate with Dermabond, no erythema or drainage. DILAN with LUQ with serosang output (75ml/24 hours) drain stripped and removed  Genitourinary: Voiding independently   Musculoskeletal: ROM intact, no joint swelling, normal strength  Extremities: No peripheral edema  Neurological: A&Ox3, No focal deficits   Psychological: tearful and anxious  Skin: Warm and dry      Last Recorded Vitals  Blood pressure 128/82, pulse 75, temperature 36.7 °C (98.1 °F), temperature source Oral, resp. rate 16, height 1.549 m (5' 1\"), weight 83.6 kg (184 lb 4.9 oz), SpO2 98%.  Intake/Output last 3 Shifts:  I/O last 3 completed shifts:  In: 980 (11.7 mL/kg) [P.O.:820; Other:160]  Out: 1840 (22 mL/kg) [Urine:1500 (0.5 mL/kg/hr); Drains:165; Stool:175]  Weight: 83.6 kg     Relevant Results  Scheduled medications  acetaminophen, 650 mg, oral, q6h  enoxaparin, 40 mg, subcutaneous, q24h  escitalopram, 20 mg, oral, Nightly  pantoprazole, 40 mg, oral, Daily before breakfast   Or  esomeprazole, 40 mg, nasoduodenal tube, Daily before breakfast   Or  pantoprazole, 40 mg, intravenous, Daily before breakfast  gabapentin, 300 mg, oral, TID  lidocaine, 1 patch, transdermal, Daily  methocarbamol, 1,000 mg, oral, q6h AMELIA  oxygen, , inhalation, Continuous - " 02/gases  rosuvastatin, 20 mg, oral, Daily  tamsulosin, 0.4 mg, oral, Daily      Continuous medications     PRN medications  PRN medications: HYDROmorphone, ketorolac, naloxone, ondansetron ODT **OR** ondansetron, oxyCODONE, oxyCODONE  Results for orders placed or performed during the hospital encounter of 06/25/24 (from the past 24 hour(s))   CBC   Result Value Ref Range    WBC 10.3 4.4 - 11.3 x10*3/uL    nRBC 0.0 0.0 - 0.0 /100 WBCs    RBC 3.57 (L) 4.00 - 5.20 x10*6/uL    Hemoglobin 10.5 (L) 12.0 - 16.0 g/dL    Hematocrit 32.1 (L) 36.0 - 46.0 %    MCV 90 80 - 100 fL    MCH 29.4 26.0 - 34.0 pg    MCHC 32.7 32.0 - 36.0 g/dL    RDW 13.8 11.5 - 14.5 %    Platelets 226 150 - 450 x10*3/uL   Basic metabolic panel   Result Value Ref Range    Glucose 152 (H) 74 - 99 mg/dL    Sodium 139 136 - 145 mmol/L    Potassium 3.9 3.5 - 5.3 mmol/L    Chloride 105 98 - 107 mmol/L    Bicarbonate 27 21 - 32 mmol/L    Anion Gap 11 10 - 20 mmol/L    Urea Nitrogen 7 6 - 23 mg/dL    Creatinine 0.73 0.50 - 1.05 mg/dL    eGFR >90 >60 mL/min/1.73m*2    Calcium 8.5 (L) 8.6 - 10.3 mg/dL     No orders to display            Assessment/Plan   Principal Problem:    Ulcerative colitis (Multi)  Active Problems:    Ulcerative colitis without complications (Multi)    Maureen See is a 53 y.o. female on day 5 of admission presenting with Ulcerative colitis (Multi). She is POD 5 from robot assisted completion proctectomy with Dr. De Leon. Intraoperative findings showed no adhesions - staple line at the peritoneal reflection.    A/P: POD 5    Neuro: Moderate post operative pain  Hx: anxiety, depression   - Scheduled Tylenol, gabapentin, and robaxin. PRN oxycodone. Added lidocaine patch and PO toradol (no IV access) to regimen  - continue home lexapro  - OOB/ ambulate 5x per day     CV: RRR, VSS. SBP 120s  Hx: Familial hypercholesterolemia  - VS every 4 hours   - home rosuvastatin     Pulm: CTAB, On RA  - IS every hour while awake   - Pulse ox every 4 hours  with VS     GI: Abdomen slightly distended, soft. Stoma beefy red, moist, producing green loose stool, and gas, well pouched (75ml/24 hours). Laparotomy sites c/d/i, well approximate with Dermabond, no erythema or drainage. DILAN with LUQ with serosang output (75ml/24 hours). Tolerating diet. +BS  Hx: GERD, IBS, UC, fatty liver  - DILAN drain was removed by me this AM. Patient tolerated well. She was educated on site care  - Continue regular diet  - enterostomal RN following   - PRN antiemetic   - chewing gum   - continue home esomeprazole     : Voiding without difficulty   - continue flomax  - Monitor I&Os   - lytes and kidney function wnl     HEME: H&H stable. 10.5/32.1  Hx: JOELLE  - DVT Proph: SCDs/ ambulate/ lovenox (will go home with)     ID: Afebrile. WBC 10.3  - no current s/s infection    Dispo: Plan for dc tomorrow. Home with lovenox (already bedside) and pain medication.         I spent 35 minutes in the professional and overall care of this patient.      Nellie Lucas, APRN-CNP

## 2024-06-30 NOTE — DISCHARGE INSTRUCTIONS
Instructions After Laparoscopic Surgery    Wound Care:  -Ice packs to wounds every hour the first day  -Ok to shower in 24 hours  -no baths or swimming for 2 weeks  -keep wound clean and dry  -do not apply topical creams or ointments  -call if you notice redness around wound, foul-smelliing drainage, or increasing pain     Diet:          - GI soft low residual as discussed with Dietary          - Giovanni Shakes 3 times a day for 5 days    Activity          -Take it easy for the first 48 hours          -stairs and walks are fine          -resume activities gradually over the first week          -ask Dr De Leon before resuming strenuous physical exertions          -No driving while on pain medication    Medications          -Pain medicine prescription attached for severe pain          -Please take Motrin/ Ibuprofen 600 mg every 6 hours scheduled for 2-3 days, then every 6 hours as needed           -Please take Tylenol 625 mg every 6 hours scheduled for 2-3 days, then every 6 hours as needed          -Resume your home medications unless otherwise directed          - Oxycodone 5mg every 6 hours as needed for pain  Other Instructions          -Call to make appointment within 1-2 days: [phone]          -Call the doctor for the following:   severe unrelieved pain   fevers > 101F   Nausea/vomiting   wound issues   insurance/return to work forms   shortness of breath   chest pains    Other Instructions:  It is important to drink adequate fluid and avoid dehydration. Signs of dehydration include dark urine, decreased frequency in urine, pale mucous membrane, or dry mucous membranes. You should drink at least 8 8oz glasses of fluids a day and it should be a variety of fluids (water, juice, tea, coffee, etc.).  If you start to experience nausea, emesis, fever, or abdominal pain please call Dr. De Leon's office [phone].         Ileostomy:     Your ileostomy is formed from the small intestine.  Your stoma should  function around the clock with the passing of gas or stool.  If you become bloated and have no output from stoma in several hours call the office.   The goal of the ileostomy output is an oatmeal or pudding consistency. Output should be less than 1000cc (one liter) per 24 hours. You may empty your pouch when it is half full, on average four times per day.    Measure the output each time you empty the pouch, and record the amount on the tally sheet given to you. Calculate a 24 hour total at the same time each day.      Drink plenty of a variety of fluids to prevent dehydration. Signs of dehydration are: dry mouth, dark yellow urine in small amounts, and dizziness with change in position or increased feeling of weakness/tiredness.     Soft diet (no raw fruits or vegetables; food should be soft enough to smash or cut with a fork) for 5-7 days after hospital discharge.  After this time you should transition to your regular diet.    For diarrhea stools incorporate foods from the BRAT diet.  This is a bland diet that consists of foods low in fiber: bananas, rice, applesauce and toast are staples of the diet.  You can also add tea, tapioca and yogurt if you would like.  Be sure to drink plenty of fluids if you are having diarrhea, as you can become dehydrated quickly.     If you have diarrhea or have an ileostomy that is putting out very watery stool (output exceeds 1000 ml in 24 hours):   Please incorporate the following foods into your diet.  These foods add bulk to your stools or thicken up the stools  o Creamy peanut butter on toast or crackers  o Yogurt  o Bananas  o Applesauce  o Rice  o Pasta  o Pretzels  o Marshmallows   Avoid foods high in sugar and caffeine.  Examples: soda, coffee, cakes, pies, cookies   Avoid eating and drinking at the same time.  Ingest fluids about 30-45 minutes after solid food   Imodium:  Take 1-2 tablets by mouth ½ hour before breakfast, lunch, dinner, and at bedtime as needed.   Do not  "exceed 8 tablets in 24 hours.   Lomotil:  Take 1-2 tablets by mouth ½ hour before breakfast, lunch, dinner, and at bedtime as needed.  Do not exceed 8 tablets in 24 hours.   Metamucil fiber cookies:  Metamucil fiber cookies are used to add bulk to your stools and therefore stopping diarrhea.   You will need to doctor yourself to determine how many of these cookies you need per day.   Please drink 64 oz of G2 (Gatorade electrolyte beverage) per day.     HOW TO CHANGE YOUR Ileostomy POUCH  Gather Supplies:  1. Soft paper towels or washcloths  2. Non-lotion soap (Ivory® or Dial® are recommended)  3. Scissors with blunt tip  4. Baseplate/Wafer: Coloplast Bryan Flex Convex large \"red\"  5. Drainable Coloplast [place] Flex red   6. Accessory products: measuring guide, adhesive removers, stoma paste, stoma powder, belt  Prepare the New Pouch:  1. Trace the pattern onto the cover paper on the back of the base plate. (Note: the hole should be 1/8\" larger than the stoma)  2. Cut the hole out of the baseplate.  3. Remove the cover paper from the back of the baseplate.  4. Apply a bead of stoma paste to opening on the back of the wafer.  5. Set the baseplate aside with the sticky side facing up.  6. Close the pouch and set it aside.  Remove the Worn Pouch:  1. Empty the contents of the worn pouch into the toilet.   2. Wipe the end of the pouch clean using damp toilet tissue or paper towel.  3. Use the adhesive remover wipe or a soapy cloth and gentle pressure to remove the worn pouch from the skin.  Clean the Skin:  1. Wash the skin around the stoma with non-lotion soap.  2. Rinse the skin with warm water.  3. Pat the skin dry using a dry washcloth or paper towel.  4. If needed, apply stoma powder to any red or irritated skin.   5. Firmly brush the excess powder off.  Apply the New Pouch:  1. Center the hole of the baseplate around the stoma and press into place.   2. Remove the cover paper from the adhesive surface on the back of the " pouch.   3. Attach the pouch securely to the landing pad on the wafer.   4. Apply warm hand over the pouch for a few moments.  5. If directed: attach belt snugly, but not too tightly (2 fingers should fit between belt and abdomen).  Check the size of your stoma at least weekly for the first 6 weeks following surgery.   The best time to change your pouch is first thing in the morning, before you have eaten or had anything to drink.  Change your pouch every 3-4 days (twice a week) for the first 6 weeks after surgery.   If you have questions about changing your pouch or if you encounter leaks or irritated skin please contact your Ostomy Nurse by calling [phone]..

## 2024-07-01 ENCOUNTER — APPOINTMENT (OUTPATIENT)
Dept: CARDIOLOGY | Facility: HOSPITAL | Age: 54
DRG: 334 | End: 2024-07-01
Payer: COMMERCIAL

## 2024-07-01 ENCOUNTER — PHARMACY VISIT (OUTPATIENT)
Dept: PHARMACY | Facility: CLINIC | Age: 54
End: 2024-07-01
Payer: COMMERCIAL

## 2024-07-01 VITALS
OXYGEN SATURATION: 98 % | SYSTOLIC BLOOD PRESSURE: 142 MMHG | HEIGHT: 61 IN | WEIGHT: 182.54 LBS | TEMPERATURE: 98.2 F | HEART RATE: 82 BPM | RESPIRATION RATE: 16 BRPM | DIASTOLIC BLOOD PRESSURE: 80 MMHG | BODY MASS INDEX: 34.46 KG/M2

## 2024-07-01 VITALS
HEIGHT: 61 IN | OXYGEN SATURATION: 96 % | DIASTOLIC BLOOD PRESSURE: 82 MMHG | RESPIRATION RATE: 16 BRPM | WEIGHT: 184.3 LBS | BODY MASS INDEX: 34.8 KG/M2 | HEART RATE: 84 BPM | SYSTOLIC BLOOD PRESSURE: 129 MMHG | TEMPERATURE: 99.7 F

## 2024-07-01 LAB
ANION GAP SERPL CALC-SCNC: 10 MMOL/L (ref 10–20)
BUN SERPL-MCNC: 6 MG/DL (ref 6–23)
CALCIUM SERPL-MCNC: 8.6 MG/DL (ref 8.6–10.3)
CHLORIDE SERPL-SCNC: 106 MMOL/L (ref 98–107)
CO2 SERPL-SCNC: 30 MMOL/L (ref 21–32)
CREAT SERPL-MCNC: 0.67 MG/DL (ref 0.5–1.05)
EGFRCR SERPLBLD CKD-EPI 2021: >90 ML/MIN/1.73M*2
ERYTHROCYTE [DISTWIDTH] IN BLOOD BY AUTOMATED COUNT: 13.8 % (ref 11.5–14.5)
GLUCOSE SERPL-MCNC: 113 MG/DL (ref 74–99)
HCT VFR BLD AUTO: 32.9 % (ref 36–46)
HGB BLD-MCNC: 11 G/DL (ref 12–16)
MCH RBC QN AUTO: 30.3 PG (ref 26–34)
MCHC RBC AUTO-ENTMCNC: 33.4 G/DL (ref 32–36)
MCV RBC AUTO: 91 FL (ref 80–100)
NRBC BLD-RTO: 0 /100 WBCS (ref 0–0)
PLATELET # BLD AUTO: 232 X10*3/UL (ref 150–450)
POTASSIUM SERPL-SCNC: 3.7 MMOL/L (ref 3.5–5.3)
RBC # BLD AUTO: 3.63 X10*6/UL (ref 4–5.2)
SODIUM SERPL-SCNC: 142 MMOL/L (ref 136–145)
WBC # BLD AUTO: 9.8 X10*3/UL (ref 4.4–11.3)

## 2024-07-01 PROCEDURE — 82374 ASSAY BLOOD CARBON DIOXIDE: CPT

## 2024-07-01 PROCEDURE — 2500000002 HC RX 250 W HCPCS SELF ADMINISTERED DRUGS (ALT 637 FOR MEDICARE OP, ALT 636 FOR OP/ED): Performed by: COLON & RECTAL SURGERY

## 2024-07-01 PROCEDURE — 2500000001 HC RX 250 WO HCPCS SELF ADMINISTERED DRUGS (ALT 637 FOR MEDICARE OP)

## 2024-07-01 PROCEDURE — 2500000004 HC RX 250 GENERAL PHARMACY W/ HCPCS (ALT 636 FOR OP/ED): Performed by: COLON & RECTAL SURGERY

## 2024-07-01 PROCEDURE — 99238 HOSP IP/OBS DSCHRG MGMT 30/<: CPT | Performed by: REGISTERED NURSE

## 2024-07-01 PROCEDURE — 85027 COMPLETE CBC AUTOMATED: CPT

## 2024-07-01 PROCEDURE — RXMED WILLOW AMBULATORY MEDICATION CHARGE

## 2024-07-01 PROCEDURE — 2500000001 HC RX 250 WO HCPCS SELF ADMINISTERED DRUGS (ALT 637 FOR MEDICARE OP): Performed by: COLON & RECTAL SURGERY

## 2024-07-01 PROCEDURE — 2500000005 HC RX 250 GENERAL PHARMACY W/O HCPCS: Performed by: COLON & RECTAL SURGERY

## 2024-07-01 PROCEDURE — 36415 COLL VENOUS BLD VENIPUNCTURE: CPT

## 2024-07-01 PROCEDURE — 93005 ELECTROCARDIOGRAM TRACING: CPT

## 2024-07-01 RX ORDER — OXYCODONE HYDROCHLORIDE 5 MG/1
5 TABLET ORAL EVERY 4 HOURS PRN
Qty: 15 TABLET | Refills: 0 | Status: SHIPPED | OUTPATIENT
Start: 2024-07-01

## 2024-07-01 ASSESSMENT — COGNITIVE AND FUNCTIONAL STATUS - GENERAL
WALKING IN HOSPITAL ROOM: A LITTLE
DRESSING REGULAR LOWER BODY CLOTHING: A LOT
MOBILITY SCORE: 14
TOILETING: A LITTLE
PERSONAL GROOMING: A LITTLE
TURNING FROM BACK TO SIDE WHILE IN FLAT BAD: A LOT
DAILY ACTIVITIY SCORE: 18
HELP NEEDED FOR BATHING: A LITTLE
MOVING TO AND FROM BED TO CHAIR: A LOT
STANDING UP FROM CHAIR USING ARMS: A LOT
CLIMB 3 TO 5 STEPS WITH RAILING: A LITTLE
MOVING FROM LYING ON BACK TO SITTING ON SIDE OF FLAT BED WITH BEDRAILS: A LOT
DRESSING REGULAR UPPER BODY CLOTHING: A LITTLE

## 2024-07-01 ASSESSMENT — PAIN SCALES - GENERAL
PAINLEVEL_OUTOF10: 6
PAINLEVEL_OUTOF10: 4
PAINLEVEL_OUTOF10: 7
PAINLEVEL_OUTOF10: 5 - MODERATE PAIN
PAINLEVEL_OUTOF10: 8
PAINLEVEL_OUTOF10: 7

## 2024-07-01 ASSESSMENT — PAIN - FUNCTIONAL ASSESSMENT
PAIN_FUNCTIONAL_ASSESSMENT: 0-10

## 2024-07-01 ASSESSMENT — PAIN DESCRIPTION - ORIENTATION: ORIENTATION: LOWER

## 2024-07-01 ASSESSMENT — PAIN DESCRIPTION - LOCATION
LOCATION: ABDOMEN
LOCATION: RECTUM
LOCATION: ABDOMEN
LOCATION: BACK

## 2024-07-01 NOTE — DISCHARGE SUMMARY
Discharge Diagnosis  Ulcerative colitis (Multi)    Issues Requiring Follow-Up  Post-op    Test Results Pending At Discharge  Pending Labs       Order Current Status    Surgical Pathology Exam In process            Hospital Course   53 yr old female with UC s/p robot assisted completion proctectomy  on 6/25 with Dr. De Leon. Please see operative report for full details. Patient tolerated the procedure well and recovered briefly in PACU before being transitioned to regular nursing floor. Post-op course was uncomplicated. Diet was advanced as tolerated.  IV medication transitioned to oral as diet advanced. On the day of discharge, the pt was tolerating a diet, pain was controlled on PO pain medication, and they were ambulating and voiding spontaneously. They were discharged home in stable condition with instructions to follow up as outpatient.       Pertinent Physical Exam At Time of Discharge  Physical Exam  Constitutional: cooperative  Eyes: PERRL, clear sclera   ENMT: Moist mucous membranes  Head/Neck: Neck supple, no JVD  Cardiovascular: RRR. 2+ equal pulses of the distal extremities.  Respiratory/Thorax: CTAB. Good symmetric chest expansion. On RA  Gastrointestinal: Abdomen soft, appropriately tender. Stoma beefy red, moist, producing green mushy stool, and gas, well pouched (75 ml/24 hours). Laparotomy sites c/d/i, well approximate with Dermabond, no erythema or drainage.   Genitourinary: Voiding independently   Musculoskeletal: ROM intact, no joint swelling, normal strength  Extremities: No peripheral edema  Neurological: A&Ox3, No focal deficits   Psychological: tearful and anxious  Skin: Warm and dry    Home Medications     Medication List      START taking these medications     enoxaparin 40 mg/0.4 mL syringe; Commonly known as: Lovenox; Inject 0.4   mL (40 mg) under the skin once daily for 26 days.   methocarbamol 500 mg tablet; Commonly known as: Robaxin; Take 2 tablets   (1,000 mg) by mouth 4 times a day  as needed for muscle spasms (surgical   pain) for up to 7 days.   ondansetron 4 mg tablet; Commonly known as: Zofran; Take 1 tablet (4 mg)   by mouth every 6 hours if needed for nausea or vomiting.   oxyCODONE 5 mg immediate release tablet; Commonly known as: Roxicodone;   Take 1 tablet (5 mg) by mouth every 4 hours if needed for severe pain (7 -   10).     CHANGE how you take these medications     esomeprazole 40 mg DR capsule; Commonly known as: NexIUM; Take 1 capsule   (40 mg) by mouth 2 times a day. Do not open capsule.; What changed: when   to take this, additional instructions   rosuvastatin 20 mg tablet; Commonly known as: Crestor; Take 1 tablet (20   mg) by mouth once daily.; What changed: additional instructions     CONTINUE taking these medications     Lexapro 20 mg tablet; Generic drug: escitalopram       Outpatient Follow-Up  Future Appointments   Date Time Provider Department Center   9/25/2024  2:30 PM Brianna Hernandez MD [id number] [place]   10/3/2024  3:20 PM Magi Pollack DO [id number] [place]   10/4/2024  1:00 PM Sandra Lemus APRN-CNP [id number] [facility]   10/16/2024 10:40 AM Janet Trivedi MD [id number] [facility]   10/22/2024  1:45 PM Irene Silver MD [id number] [place]       Sumi Hamilton APRN-CNP

## 2024-07-01 NOTE — NURSING NOTE

## 2024-07-01 NOTE — CONSULTS
"WO nursing visit outcome: Ileostomy pouch was changed. Mrs. See participated in pouching and is well-versed in stoma care/pouching.   Per her request, [place] will be updated to request a pre-cut pouch.      [facility] next scheduled visit/plan: next change due on Thursday    Stoma Type: End Ileostomy  Stanley: No  Diameter: 1 1/8\" (slightly irregular/oval)  Location: RLQ  Protrusion: Budded  Mucosal Condition and Color: Moist, Red  Mucocutaneous Junction: Intact  Peristomal Skin: Clear, intact  Location of Skin Impairment: n/a  Peristomal Contour: Rounded  Supportive Tissue: Semi-Soft  Character of Output: Green and Thick/Mushy Liquid  Emptying Frequency: per nursing  Removed/Current Pouching System: 2 3/4\" ConvaTec moldable flat wafer, barrier ring, Natura Invisiclose pouch  Current Wearing Time: 6 Days    Recommendations:   Skin Care: stoma powder to denuded skin as needed with pouch change - Mrs. See uses stoma powder and Safe n' Simple skin barrier wipe  Pouching System: Coloplast Bryan , Soft Convex, and Drainable, CeraRing, Belt, (won't wear belt until more mobile)  Wear Time: 3-4 Days  Other: precut pouch is #[id number]    Incision: lap sites, SAURABH, not drainage, no erythema, dermabond on most sites  Drain:  LLQ drain has been removed, dry dressing in place    Supplier: [employer]    Comments: no additional teaching required; stoma is called \"Miranda\"    Time Increment: 30 minutes    Brianna Verma, BENNETTN, RN, CWOCN     "

## 2024-07-01 NOTE — CARE PLAN
The patient's goals for the shift include      The clinical goals for the shift include patient will remain safe and ring for assistance this shift    Problem: Pain  Goal: Takes deep breaths with improved pain control throughout the shift  Outcome: Progressing  Goal: Participates in PT with improved pain control throughout the shift  Outcome: Progressing  Goal: Free from opioid side effects throughout the shift  Outcome: Progressing  Goal: Free from acute confusion related to pain meds throughout the shift  Outcome: Progressing     Problem: Skin  Goal: Decreased wound size/increased tissue granulation at next dressing change  Outcome: Progressing  Goal: Participates in plan/prevention/treatment measures  Outcome: Progressing  Goal: Prevent/manage excess moisture  Outcome: Progressing  Goal: Prevent/minimize sheer/friction injuries  Outcome: Progressing  Goal: Promote/optimize nutrition  Outcome: Progressing  Goal: Promote skin healing  Outcome: Progressing     Problem: Pain - Adult  Goal: Verbalizes/displays adequate comfort level or baseline comfort level  Outcome: Progressing     Problem: Chronic Conditions and Co-morbidities  Goal: Patient's chronic conditions and co-morbidity symptoms are monitored and maintained or improved  Outcome: Progressing     Problem: Pain  Goal: Turns in bed with improved pain control throughout the shift  Outcome: Met  Goal: Walks with improved pain control throughout the shift  Outcome: Met  Goal: Performs ADL's with improved pain control throughout shift  Outcome: Met     Problem: Safety - Adult  Goal: Free from fall injury  Outcome: Met     Problem: Discharge Planning  Goal: Discharge to home or other facility with appropriate resources  Outcome: Met

## 2024-07-02 ENCOUNTER — TELEPHONE (OUTPATIENT)
Dept: SURGERY | Facility: CLINIC | Age: 54
End: 2024-07-02
Payer: COMMERCIAL

## 2024-07-02 ENCOUNTER — PATIENT OUTREACH (OUTPATIENT)
Dept: CARE COORDINATION | Facility: CLINIC | Age: 54
End: 2024-07-02
Payer: COMMERCIAL

## 2024-07-02 ENCOUNTER — PATIENT MESSAGE (OUTPATIENT)
Dept: SURGERY | Facility: CLINIC | Age: 54
End: 2024-07-02
Payer: COMMERCIAL

## 2024-07-02 NOTE — TELEPHONE ENCOUNTER
Post op call.  She is s/p 6/25/2024 Robotic CP, End ileostomy.  Discharge on July 1, 2024.  Message left for patient that I was calling to check on her.  I invited her to call the office with an update at [phone].  Germaine Rivera RN

## 2024-07-02 NOTE — PROGRESS NOTES
Outreach call to patient to support a smooth transition of care from recent admission.  Left voicemail message for patient with my contact information.  Enrolled patient in [employer] chatbot for additional support and patient education through transition period.  Will continue to monitor through transition period.      Kaleigh YANES, RN, [facility] Care Organization  O: [phone]

## 2024-07-02 NOTE — TELEPHONE ENCOUNTER
Post op call.  She is s/p 6/25/2024 Robotic CP, End ileostomy.  She has no abdominal pain.  Pretty uncomfortable with the perineal wound.  Walking around is getting easier.  Has to change positions frequently to get comfortable.  The perineal wound is spotting red/brown drainage. This started in the hospital. She will change the pad as needed.  She is taking Oxycodone and a muscle relaxer.  Denies fever/chills.  Ostomy working.  Urinating without issues.  We discussed that I do expect her to be uncomfortable in the perineal wound.  The worst will be the first two weeks post op.  She will call with purulent drainage, fever/chills, or any worrisome symptoms.  Germaine Rivera RN

## 2024-07-03 ENCOUNTER — PATIENT MESSAGE (OUTPATIENT)
Dept: SURGERY | Facility: CLINIC | Age: 54
End: 2024-07-03
Payer: COMMERCIAL

## 2024-07-03 ENCOUNTER — APPOINTMENT (OUTPATIENT)
Dept: GASTROENTEROLOGY | Facility: CLINIC | Age: 54
End: 2024-07-03
Payer: COMMERCIAL

## 2024-07-03 DIAGNOSIS — K51.00 ULCERATIVE PANCOLITIS WITHOUT COMPLICATION (MULTI): ICD-10-CM

## 2024-07-03 LAB
LABORATORY COMMENT REPORT: NORMAL
PATH REPORT.FINAL DX SPEC: NORMAL
PATH REPORT.GROSS SPEC: NORMAL
PATH REPORT.RELEVANT HX SPEC: NORMAL
PATH REPORT.TOTAL CANCER: NORMAL

## 2024-07-03 RX ORDER — OXYCODONE HYDROCHLORIDE 5 MG/1
5 TABLET ORAL EVERY 6 HOURS PRN
Qty: 15 TABLET | Refills: 0 | Status: SHIPPED | OUTPATIENT
Start: 2024-07-03 | End: 2024-07-10

## 2024-07-03 NOTE — TELEPHONE ENCOUNTER
From: Maureen See  To: Shantel De Leon  Sent: 7/3/2024 1:10 AM EDT  Subject: Pain    Pain in my wife’s bottom hit a 10. Gave her 2 oxycodone at time it was due. The hospital had Maureen on 2 of them every four hours or as needed. Within 20 mins she finally was comfortable and fell asleep. Is there possibly reason why dosage dropped? She was sent home with just 15 pills for five days and I’m concerned if her pain is still bad after the 15 pills used up. Maureen’s been fighting hard but this was just too much tonight for her. I wanted to immediately send you a note on what happened. Thank you.

## 2024-07-08 ENCOUNTER — DOCUMENTATION (OUTPATIENT)
Dept: CARE COORDINATION | Facility: CLINIC | Age: 54
End: 2024-07-08
Payer: COMMERCIAL

## 2024-07-08 ENCOUNTER — PATIENT OUTREACH (OUTPATIENT)
Dept: CARE COORDINATION | Facility: CLINIC | Age: 54
End: 2024-07-08
Payer: COMMERCIAL

## 2024-07-08 NOTE — PROGRESS NOTES
Maureen See  Program: [facility] Generic V2  MRN: [id number]  YOB: 1970    This patient had a RED on Sun, 7 Jul 2024 2:34:08 pm EDT    Question(s) with flags that caused the Alert (up to last 5 values   recorded)    Question: Is there a reason you haven't scheduled this appointment?     Date:     2024-07-07     Color:    red     Answers:  Other reasons    Question: Have you scheduled a follow-up visit with your primary care   doctor or specialist?     Date:     2024-07-07     Color:    red     Answers:  No    Question: Do you feel like you have the help you need?     Date:     2024-07-07     Color:    red     Answers:  No      Kaleigh GUAJARDON, RN, [facility] Care Organization  O: [phone]

## 2024-07-08 NOTE — PROGRESS NOTES
Attempted outreach due to chat notification.  Left a voice message with my contact information.      Kaleigh GUAJARDON, RN, [facility] Care Organization  O: [phone]

## 2024-07-09 ENCOUNTER — PATIENT MESSAGE (OUTPATIENT)
Dept: SURGERY | Facility: CLINIC | Age: 54
End: 2024-07-09
Payer: COMMERCIAL

## 2024-07-09 DIAGNOSIS — K51.00 ULCERATIVE PANCOLITIS WITHOUT COMPLICATION (MULTI): ICD-10-CM

## 2024-07-09 RX ORDER — TRAMADOL HYDROCHLORIDE 50 MG/1
50 TABLET ORAL EVERY 6 HOURS PRN
Qty: 15 TABLET | Refills: 0 | Status: SHIPPED | OUTPATIENT
Start: 2024-07-09 | End: 2024-07-16

## 2024-07-09 NOTE — TELEPHONE ENCOUNTER
From: Maureen See  To: Shantel Haley  Sent: 7/9/2024 7:34 AM EDT  Subject: Refills    Morning. Yesterday was little better. The pain was about a 6 last night. I’m taking the muscle relaxers still and completed the oxycodone. I have two pillows under me when I’m resting. I’m walking as much as I can handle. Akash will be bringing me tomorrow. Can I please get refill of oxycodone ? It  Has been helping. I’m obviously just not 100%. Also want to make sure I have pain under control going in and out of car to. There has been no way I can get into cars but know I need to come tomorrow. The one pill every 6 hours did not help. I had been taking same dose hospital had me on. I have been taking two pills every 6 hours. Thank you

## 2024-07-10 ENCOUNTER — OFFICE VISIT (OUTPATIENT)
Dept: SURGERY | Facility: CLINIC | Age: 54
End: 2024-07-10
Payer: COMMERCIAL

## 2024-07-10 VITALS
TEMPERATURE: 97.9 F | SYSTOLIC BLOOD PRESSURE: 150 MMHG | DIASTOLIC BLOOD PRESSURE: 79 MMHG | BODY MASS INDEX: 32.66 KG/M2 | HEIGHT: 61 IN | HEART RATE: 77 BPM | WEIGHT: 173 LBS

## 2024-07-10 DIAGNOSIS — Z09 SURGICAL FOLLOW-UP CARE: Primary | ICD-10-CM

## 2024-07-10 DIAGNOSIS — F41.9 ANXIETY: ICD-10-CM

## 2024-07-10 DIAGNOSIS — Z93.2 ILEOSTOMY IN PLACE (MULTI): ICD-10-CM

## 2024-07-10 DIAGNOSIS — K51.00 ULCERATIVE PANCOLITIS WITHOUT COMPLICATION (MULTI): ICD-10-CM

## 2024-07-10 PROCEDURE — 99211 OFF/OP EST MAY X REQ PHY/QHP: CPT | Performed by: NURSE PRACTITIONER

## 2024-07-10 PROCEDURE — 1036F TOBACCO NON-USER: CPT | Performed by: NURSE PRACTITIONER

## 2024-07-10 ASSESSMENT — PAIN SCALES - GENERAL: PAINLEVEL: 8

## 2024-07-10 NOTE — PROGRESS NOTES
"Chief Compliant:  POV      History Of Present Illness  Maureen See is a 53 y.o. female with h/o UC. S/p Laparoscopic total abdominal colectomy, end ileostomy 5/8/23. Most recently, she underwent robotic completion proctectomy on 6/25/24.   FINAL DIAGNOSIS   A. ANUS RESECTION:   -Colonic mucosa with prominent lymphoid follicles and pseudopolyps, consistent with out of circuit bowel  -Anal squamous epithelium, no significant pathologic findings     Note: Granuloma or dysplasia is not identified.       Denies any F/C, N/V, CP/SOB, dysuria.   Denies any abdominal pain. C/o perineal pain with movement and sitting. Scant amount of bloody drain from perineal wound. She does note a small amount of improvement everyday. Her  said she is moving around better.  Appetite: good  Energy: improving  Weight: stable  Ostomy: ileostomy  Ostomy output: oatmeal consistency     Physical Exam  Constitutional: Well developed, awake/alert/oriented x3, no distress, alert and cooperative   Eyes: Sclera anicteric, no conjunctival inflammation, conjugate gaze   ENMT: mucous membranes moist, no apparent injury,   Head/Neck: Neck supple, no apparent injury, trachea midline, no bruits   Respiratory/Thorax: Patent airways, CTAB, normal breath sounds with good chest expansion  Cardiovascular: Regular, rate and rhythm, no murmurs, normal S1 and S2   Gastrointestinal: Nondistended, soft, non-tender. Ileostomy pouched with non-bloody enteric contents in the appliance.  Extremities: normal extremities, no edema  Neurological: alert and oriented x3, normal strength, Normal gait   Psychological: Appropriate mood and behavior. Anxious   Skin: Warm and dry, no lesions, no rashes   Perineal wound with scant amount of serosanguinous drainage. Incision is clean and intact.     Last Recorded Vitals  Blood pressure 150/79, pulse 77, temperature 36.6 °C (97.9 °F), height 1.549 m (5' 1\"), weight 78.5 kg (173 lb).    Assessment:  H/o UC. Extreme " anxiety  S/p TAC EI 5/23  S/p Completion proctectomy 6/25/24  Perineal wound is healing nicely    Plan:  -Reassured patient that everything looks normal and she is progressing as expected.  -Diet: as tolerated. Discussed avoiding processes foods and sugary foods.  -Activity: Normal activities can be resumed. No lifting greater than 10 lbs for a full 6 weeks following surgery   -Wound care: Gentle handheld shower to perineal wound. May use pad or pull up to collect any drainage if she feels it is necessary  -Follow-up with me in 2 weeks.  -All questions and concerns were answered. Encouraged to call with any question or concerns.      Morenita Holcomb, KERI-CNP

## 2024-07-11 ENCOUNTER — PATIENT OUTREACH (OUTPATIENT)
Dept: CARE COORDINATION | Facility: CLINIC | Age: 54
End: 2024-07-11
Payer: COMMERCIAL

## 2024-07-11 NOTE — PROGRESS NOTES
Attempted outreach call to patient following up on an appointment with the care provider.  Left a voice message with my contact information.   Will continue to follow.        Kaleigh YANES, RN, [facility] Care Organization  O: [phone]

## 2024-07-14 ENCOUNTER — PATIENT MESSAGE (OUTPATIENT)
Dept: SURGERY | Facility: CLINIC | Age: 54
End: 2024-07-14
Payer: COMMERCIAL

## 2024-07-15 DIAGNOSIS — K51.00 ULCERATIVE PANCOLITIS WITHOUT COMPLICATION (MULTI): ICD-10-CM

## 2024-07-15 RX ORDER — TRAMADOL HYDROCHLORIDE 50 MG/1
50 TABLET ORAL EVERY 6 HOURS PRN
Qty: 15 TABLET | Refills: 0 | Status: SHIPPED | OUTPATIENT
Start: 2024-07-15 | End: 2024-07-24 | Stop reason: SDUPTHER

## 2024-07-24 ENCOUNTER — LAB (OUTPATIENT)
Dept: LAB | Facility: LAB | Age: 54
End: 2024-07-24
Payer: COMMERCIAL

## 2024-07-24 ENCOUNTER — APPOINTMENT (OUTPATIENT)
Dept: SURGERY | Facility: CLINIC | Age: 54
End: 2024-07-24
Payer: COMMERCIAL

## 2024-07-24 VITALS
HEART RATE: 91 BPM | SYSTOLIC BLOOD PRESSURE: 121 MMHG | WEIGHT: 175 LBS | BODY MASS INDEX: 33.04 KG/M2 | HEIGHT: 61 IN | DIASTOLIC BLOOD PRESSURE: 72 MMHG | TEMPERATURE: 98 F

## 2024-07-24 DIAGNOSIS — Z09 POSTOPERATIVE EXAMINATION: Primary | ICD-10-CM

## 2024-07-24 DIAGNOSIS — Z93.2 ILEOSTOMY IN PLACE (MULTI): ICD-10-CM

## 2024-07-24 DIAGNOSIS — K51.00 ULCERATIVE PANCOLITIS WITHOUT COMPLICATION (MULTI): ICD-10-CM

## 2024-07-24 LAB
ANION GAP SERPL CALC-SCNC: 13 MMOL/L (ref 10–20)
BUN SERPL-MCNC: 10 MG/DL (ref 6–23)
CALCIUM SERPL-MCNC: 9.8 MG/DL (ref 8.6–10.3)
CHLORIDE SERPL-SCNC: 104 MMOL/L (ref 98–107)
CO2 SERPL-SCNC: 27 MMOL/L (ref 21–32)
CREAT SERPL-MCNC: 0.79 MG/DL (ref 0.5–1.05)
EGFRCR SERPLBLD CKD-EPI 2021: 90 ML/MIN/1.73M*2
ERYTHROCYTE [DISTWIDTH] IN BLOOD BY AUTOMATED COUNT: 13.5 % (ref 11.5–14.5)
GLUCOSE SERPL-MCNC: 120 MG/DL (ref 74–99)
HCT VFR BLD AUTO: 38.4 % (ref 36–46)
HGB BLD-MCNC: 12.3 G/DL (ref 12–16)
MCH RBC QN AUTO: 29.6 PG (ref 26–34)
MCHC RBC AUTO-ENTMCNC: 32 G/DL (ref 32–36)
MCV RBC AUTO: 92 FL (ref 80–100)
NRBC BLD-RTO: 0 /100 WBCS (ref 0–0)
PLATELET # BLD AUTO: 288 X10*3/UL (ref 150–450)
POTASSIUM SERPL-SCNC: 4.8 MMOL/L (ref 3.5–5.3)
RBC # BLD AUTO: 4.16 X10*6/UL (ref 4–5.2)
SODIUM SERPL-SCNC: 139 MMOL/L (ref 136–145)
WBC # BLD AUTO: 8 X10*3/UL (ref 4.4–11.3)

## 2024-07-24 PROCEDURE — 80048 BASIC METABOLIC PNL TOTAL CA: CPT

## 2024-07-24 PROCEDURE — 85027 COMPLETE CBC AUTOMATED: CPT

## 2024-07-24 PROCEDURE — 36415 COLL VENOUS BLD VENIPUNCTURE: CPT

## 2024-07-24 PROCEDURE — 1036F TOBACCO NON-USER: CPT | Performed by: NURSE PRACTITIONER

## 2024-07-24 PROCEDURE — 99211 OFF/OP EST MAY X REQ PHY/QHP: CPT | Performed by: NURSE PRACTITIONER

## 2024-07-24 PROCEDURE — 3008F BODY MASS INDEX DOCD: CPT | Performed by: NURSE PRACTITIONER

## 2024-07-24 RX ORDER — TRAMADOL HYDROCHLORIDE 50 MG/1
50 TABLET ORAL NIGHTLY
Qty: 10 TABLET | Refills: 0 | Status: SHIPPED | OUTPATIENT
Start: 2024-07-24 | End: 2024-08-03

## 2024-07-24 ASSESSMENT — PAIN SCALES - GENERAL: PAINLEVEL: 8

## 2024-07-24 NOTE — PROGRESS NOTES
"Chief Compliant:  POV    History Of Present Illness  Maureen See is a 53 y.o. female with h/o UC. S/p Laparoscopic total abdominal colectomy, end ileostomy 5/8/23. Most recently, she underwent robotic completion proctectomy on 6/25/24.   FINAL DIAGNOSIS   A. ANUS RESECTION:   -Colonic mucosa with prominent lymphoid follicles and pseudopolyps, consistent with out of circuit bowel  -Anal squamous epithelium, no significant pathologic findings     Note: Granuloma or dysplasia is not identified.     She was last seen 7/10/24  Still having a lot of perineal pain. Taking Tylenol. Pain is worse at night. She is out of pain medication.  She has not been using handheld shower to perineal wound.  Having scant amount of drainage from perineal wound.  Denies any F/C, N/V, CP/SOB.   She does not think she is urinating enough but when she does urinate the urine is pale. Denies dysuria.   Denies any abdominal pain.  Appetite: good  Energy: improving  Weight: stable  Ostomy: ileostomy  Ostomy output: oatmeal consistency     Physical Exam  Constitutional: Well developed, awake/alert/oriented x3, no distress, alert and cooperative   Eyes: Sclera anicteric, no conjunctival inflammation, conjugate gaze   ENMT: mucous membranes moist, no apparent injury,   Head/Neck: Neck supple, no apparent injury, trachea midline, no bruits   Gastrointestinal: Nondistended, soft, non-tender. Ileostomy pouched with non-bloody enteric contents in the appliance. Incisions are clean and dry.  Extremities: normal extremities, no edema  Neurological: alert and oriented x3, normal strength, Ambulating with walker  Psychological: Appropriate mood and behavior. Anxious   Skin: Warm and dry, no lesions, no rashes   Perineal wound with scant amount of serous drainage. Incision is clean and intact.     Last Recorded Vitals  /72   Pulse 91   Temp 36.7 °C (98 °F)   Ht 1.549 m (5' 1\")   Wt 79.4 kg (175 lb)   BMI 33.07 kg/m²       Assessment:  H/o UC. " Extreme anxiety  S/p TAC EI 5/23  S/p Completion proctectomy 6/25/24  Perineal wound is healing nicely    Plan:  -Reassured patient that everything looks normal.  -Increase activity  -Sit on soft pillow  -Lab work today  -Tylenol 1000 mg 3-4 times daily  -Tramadol at bedtime. Acute pain lasting longer than 7 days d/t perineal wound. Safe usage discussed with the patient. OAARS report checked-no red flags.  R/B/A were discussed with the patient. The understands and agrees.  -Handheld shower to perineal wound  -Follow up with me 8/12  -Will extend her disability to 8/18. RTW 8/19/24  -All questions and concerns were answered. Encouraged to call with any question or concerns.    KERI Sifuentes-CNP 07/24/24 8:32 AM

## 2024-07-24 NOTE — PATIENT INSTRUCTIONS
-Increase activity  -Sit on soft pillow  -Lab work today  -Tylenol 1000 mg 3-4 times daily  -Tramadol at bedtime.   -Handheld shower to perineal wound  -Follow up with me 8/12

## 2024-07-25 ENCOUNTER — APPOINTMENT (OUTPATIENT)
Dept: DERMATOLOGY | Facility: CLINIC | Age: 54
End: 2024-07-25
Payer: COMMERCIAL

## 2024-07-30 ENCOUNTER — TELEPHONE (OUTPATIENT)
Dept: SURGERY | Facility: HOSPITAL | Age: 54
End: 2024-07-30
Payer: COMMERCIAL

## 2024-07-30 DIAGNOSIS — K51.00 ULCERATIVE PANCOLITIS WITHOUT COMPLICATION (MULTI): ICD-10-CM

## 2024-07-30 RX ORDER — TRAMADOL HYDROCHLORIDE 50 MG/1
50 TABLET ORAL NIGHTLY
Qty: 10 TABLET | Refills: 0 | Status: SHIPPED | OUTPATIENT
Start: 2024-07-30 | End: 2024-08-09

## 2024-07-30 NOTE — TELEPHONE ENCOUNTER
Víctor Xavier. I am walking a lot more and doing light chores around house dealing with the pain. Pain is not super bad during day so only take Tylenol one time. But bedtime is really really rough. I take the tramadol around 8:30 at night before bed to kick in. I have three more pills left. Would I be able to get one more refill please.  I went outside this weekend a lot and walked my yard with my .      Email from pt.  Acute pain lasting longer than 7 days d/t Perineal wound.   Refill for 10 days only to be taken at night..   Safe usage discussed with the patient.   OAARS report checked-no red flags.  R/B/A were discussed with the patient. The understands and agrees.

## 2024-07-31 ENCOUNTER — PATIENT OUTREACH (OUTPATIENT)
Dept: CARE COORDINATION | Facility: CLINIC | Age: 54
End: 2024-07-31
Payer: COMMERCIAL

## 2024-07-31 NOTE — PROGRESS NOTES
Attempted outreach call to patient to check in 30 days after hospital discharge to support smooth transition of care.  Left a voice message with my contact information.  No additional outreach needed at this time.    shelbie YANES, RN, [facility] Care Organization  O: [phone]

## 2024-08-12 ENCOUNTER — OFFICE VISIT (OUTPATIENT)
Dept: SURGERY | Facility: CLINIC | Age: 54
End: 2024-08-12
Payer: COMMERCIAL

## 2024-08-12 VITALS
HEART RATE: 79 BPM | TEMPERATURE: 98.2 F | SYSTOLIC BLOOD PRESSURE: 141 MMHG | DIASTOLIC BLOOD PRESSURE: 74 MMHG | WEIGHT: 174 LBS | BODY MASS INDEX: 32.85 KG/M2 | HEIGHT: 61 IN

## 2024-08-12 DIAGNOSIS — Z93.2 ILEOSTOMY IN PLACE (MULTI): ICD-10-CM

## 2024-08-12 DIAGNOSIS — Z09 POSTOPERATIVE EXAMINATION: ICD-10-CM

## 2024-08-12 DIAGNOSIS — F41.9 ANXIETY: ICD-10-CM

## 2024-08-12 DIAGNOSIS — K51.00 ULCERATIVE PANCOLITIS WITHOUT COMPLICATION (MULTI): Primary | ICD-10-CM

## 2024-08-12 PROCEDURE — 99211 OFF/OP EST MAY X REQ PHY/QHP: CPT | Performed by: NURSE PRACTITIONER

## 2024-08-12 PROCEDURE — 3008F BODY MASS INDEX DOCD: CPT | Performed by: NURSE PRACTITIONER

## 2024-08-12 ASSESSMENT — PAIN SCALES - GENERAL: PAINLEVEL: 3

## 2024-08-12 NOTE — PROGRESS NOTES
"Chief Compliant:  POV    History Of Present Illness  Maureen See is a 53 y.o. female with h/o UC. S/p Laparoscopic total abdominal colectomy, end ileostomy 5/8/23. Most recently, she underwent robotic completion proctectomy on 6/25/24.   FINAL DIAGNOSIS   A. ANUS RESECTION:   -Colonic mucosa with prominent lymphoid follicles and pseudopolyps, consistent with out of circuit bowel  -Anal squamous epithelium, no significant pathologic findings     Note: Granuloma or dysplasia is not identified.     She was last seen 7/24/24  Denies any F/C, N/V, CP/SOB.   Feels a lot better since last visit. Has been using handheld shower to perineal wound.  She has been sitting for longer periods of time.  Appetite: good  Energy: better  Weight: stable  Ostomy: ileostomy  Ostomy output: oatmeal consistency     Physical Exam  Constitutional: Well developed, awake/alert/oriented x3, no distress, alert and cooperative   Eyes: Sclera anicteric, no conjunctival inflammation, conjugate gaze   ENMT: mucous membranes moist, no apparent injury,   Head/Neck: Neck supple, no apparent injury, trachea midline, no bruits   Gastrointestinal: Nondistended, soft, non-tender. Ileostomy pouched with non-bloody enteric contents in the appliance. Incisions are healed.  Extremities: normal extremities, no edema  Neurological: alert and oriented x3, normal strength, Ambulating independently.  Psychological: Appropriate mood and behavior. Anxious   Skin: Warm and dry, no lesions, no rashes   Perineal wound is clean and dry    Last Recorded Vitals  /74   Pulse 79   Temp 36.8 °C (98.2 °F)   Ht 1.549 m (5' 1\")   Wt 78.9 kg (174 lb)   BMI 32.88 kg/m²     Assessment:  H/o UC. Extreme anxiety  S/p TAC EI 5/23  S/p Completion proctectomy 6/25/24  Perineal wound is healed     Plan:  -Diet: as tolerated  -Activity: Normal activities can be resumed. No lifting greater than 10 lbs for a full 6 weeks following surgery   -Wound care: Continue using hand held " shower  -Ok to RTW next week on a part-time   -Follow-up with Dr. De Leon in 6 months or sooner if any problems  -All questions and concerns were answered. Encouraged to call with any question or concerns.    Morenita Holcomb, KERI-CNP 07/24/24 8:32 AM

## 2024-08-14 ENCOUNTER — APPOINTMENT (OUTPATIENT)
Dept: SURGERY | Facility: CLINIC | Age: 54
End: 2024-08-14
Payer: COMMERCIAL

## 2024-09-09 DIAGNOSIS — Z30.09 BIRTH CONTROL COUNSELING: Primary | ICD-10-CM

## 2024-09-09 RX ORDER — NORETHINDRONE 0.35 MG/1
1 TABLET ORAL DAILY
Qty: 28 TABLET | Refills: 5 | Status: SHIPPED | OUTPATIENT
Start: 2024-09-09 | End: 2025-01-07

## 2024-09-16 ENCOUNTER — APPOINTMENT (OUTPATIENT)
Dept: SURGERY | Facility: CLINIC | Age: 54
End: 2024-09-16
Payer: COMMERCIAL

## 2024-09-25 ENCOUNTER — APPOINTMENT (OUTPATIENT)
Dept: PRIMARY CARE | Facility: CLINIC | Age: 54
End: 2024-09-25
Payer: COMMERCIAL

## 2024-09-27 DIAGNOSIS — E78.01 FAMILIAL HYPERCHOLESTEROLEMIA: ICD-10-CM

## 2024-09-27 RX ORDER — ROSUVASTATIN CALCIUM 20 MG/1
20 TABLET, COATED ORAL DAILY
Qty: 90 TABLET | Refills: 0 | Status: SHIPPED | OUTPATIENT
Start: 2024-09-27

## 2024-10-01 ENCOUNTER — PATIENT MESSAGE (OUTPATIENT)
Dept: SURGERY | Facility: CLINIC | Age: 54
End: 2024-10-01
Payer: COMMERCIAL

## 2024-10-03 ENCOUNTER — APPOINTMENT (OUTPATIENT)
Dept: DERMATOLOGY | Facility: CLINIC | Age: 54
End: 2024-10-03
Payer: COMMERCIAL

## 2024-10-16 ENCOUNTER — APPOINTMENT (OUTPATIENT)
Dept: RHEUMATOLOGY | Facility: CLINIC | Age: 54
End: 2024-10-16
Payer: COMMERCIAL

## 2024-10-21 ENCOUNTER — PATIENT MESSAGE (OUTPATIENT)
Dept: SURGERY | Facility: CLINIC | Age: 54
End: 2024-10-21
Payer: COMMERCIAL

## 2024-10-22 ENCOUNTER — APPOINTMENT (OUTPATIENT)
Dept: OBSTETRICS AND GYNECOLOGY | Facility: CLINIC | Age: 54
End: 2024-10-22
Payer: COMMERCIAL

## 2024-10-29 ENCOUNTER — OFFICE VISIT (OUTPATIENT)
Dept: SURGERY | Facility: CLINIC | Age: 54
End: 2024-10-29
Payer: COMMERCIAL

## 2024-10-29 VITALS
OXYGEN SATURATION: 99 % | SYSTOLIC BLOOD PRESSURE: 131 MMHG | TEMPERATURE: 97.7 F | BODY MASS INDEX: 32.12 KG/M2 | HEART RATE: 83 BPM | WEIGHT: 170 LBS | DIASTOLIC BLOOD PRESSURE: 77 MMHG

## 2024-10-29 DIAGNOSIS — K51.90 ULCERATIVE COLITIS WITHOUT COMPLICATIONS, UNSPECIFIED LOCATION (MULTI): Primary | ICD-10-CM

## 2024-10-29 PROCEDURE — 1036F TOBACCO NON-USER: CPT | Performed by: COLON & RECTAL SURGERY

## 2024-10-29 PROCEDURE — 99213 OFFICE O/P EST LOW 20 MIN: CPT | Performed by: COLON & RECTAL SURGERY

## 2024-10-29 ASSESSMENT — PAIN SCALES - GENERAL: PAINLEVEL_OUTOF10: 6

## 2024-10-30 ENCOUNTER — PATIENT MESSAGE (OUTPATIENT)
Dept: SURGERY | Facility: CLINIC | Age: 54
End: 2024-10-30
Payer: COMMERCIAL

## 2024-10-30 DIAGNOSIS — R10.2 PERINEAL PAIN IN FEMALE: ICD-10-CM

## 2024-11-04 ENCOUNTER — LAB (OUTPATIENT)
Dept: LAB | Facility: LAB | Age: 54
End: 2024-11-04
Payer: COMMERCIAL

## 2024-11-04 ENCOUNTER — APPOINTMENT (OUTPATIENT)
Dept: PRIMARY CARE | Facility: CLINIC | Age: 54
End: 2024-11-04
Payer: COMMERCIAL

## 2024-11-04 DIAGNOSIS — R10.84 GENERALIZED ABDOMINAL PAIN: ICD-10-CM

## 2024-11-04 DIAGNOSIS — R10.2 PERINEAL PAIN IN FEMALE: ICD-10-CM

## 2024-11-04 LAB
ALBUMIN SERPL BCP-MCNC: 4.3 G/DL (ref 3.4–5)
ALP SERPL-CCNC: 126 U/L (ref 33–110)
ALT SERPL W P-5'-P-CCNC: 23 U/L (ref 7–45)
ANION GAP SERPL CALC-SCNC: 14 MMOL/L (ref 10–20)
AST SERPL W P-5'-P-CCNC: 18 U/L (ref 9–39)
BILIRUB SERPL-MCNC: 0.3 MG/DL (ref 0–1.2)
BUN SERPL-MCNC: 15 MG/DL (ref 6–23)
CALCIUM SERPL-MCNC: 9.2 MG/DL (ref 8.6–10.6)
CHLORIDE SERPL-SCNC: 105 MMOL/L (ref 98–107)
CO2 SERPL-SCNC: 27 MMOL/L (ref 21–32)
CREAT SERPL-MCNC: 0.71 MG/DL (ref 0.5–1.05)
CRP SERPL-MCNC: 0.67 MG/DL
EGFRCR SERPLBLD CKD-EPI 2021: >90 ML/MIN/1.73M*2
ERYTHROCYTE [DISTWIDTH] IN BLOOD BY AUTOMATED COUNT: 14.6 % (ref 11.5–14.5)
ERYTHROCYTE [SEDIMENTATION RATE] IN BLOOD BY WESTERGREN METHOD: 16 MM/H (ref 0–30)
GLUCOSE SERPL-MCNC: 156 MG/DL (ref 74–99)
HCT VFR BLD AUTO: 37.6 % (ref 36–46)
HGB BLD-MCNC: 11.8 G/DL (ref 12–16)
LIPASE SERPL-CCNC: 18 U/L (ref 9–82)
MCH RBC QN AUTO: 27.2 PG (ref 26–34)
MCHC RBC AUTO-ENTMCNC: 31.4 G/DL (ref 32–36)
MCV RBC AUTO: 87 FL (ref 80–100)
NRBC BLD-RTO: 0 /100 WBCS (ref 0–0)
PLATELET # BLD AUTO: 254 X10*3/UL (ref 150–450)
POTASSIUM SERPL-SCNC: 3.9 MMOL/L (ref 3.5–5.3)
PROT SERPL-MCNC: 7 G/DL (ref 6.4–8.2)
RBC # BLD AUTO: 4.34 X10*6/UL (ref 4–5.2)
SODIUM SERPL-SCNC: 142 MMOL/L (ref 136–145)
WBC # BLD AUTO: 8.2 X10*3/UL (ref 4.4–11.3)

## 2024-11-04 PROCEDURE — 83690 ASSAY OF LIPASE: CPT

## 2024-11-04 PROCEDURE — 36415 COLL VENOUS BLD VENIPUNCTURE: CPT

## 2024-11-04 PROCEDURE — 86140 C-REACTIVE PROTEIN: CPT

## 2024-11-04 PROCEDURE — 85027 COMPLETE CBC AUTOMATED: CPT

## 2024-11-04 PROCEDURE — 85652 RBC SED RATE AUTOMATED: CPT

## 2024-11-04 PROCEDURE — 80053 COMPREHEN METABOLIC PANEL: CPT

## 2024-11-13 ENCOUNTER — APPOINTMENT (OUTPATIENT)
Dept: RHEUMATOLOGY | Facility: CLINIC | Age: 54
End: 2024-11-13
Payer: COMMERCIAL

## 2024-11-13 ENCOUNTER — HOSPITAL ENCOUNTER (OUTPATIENT)
Dept: RADIOLOGY | Facility: CLINIC | Age: 54
Discharge: HOME | End: 2024-11-13
Payer: COMMERCIAL

## 2024-11-13 DIAGNOSIS — K51.90 ULCERATIVE COLITIS WITHOUT COMPLICATIONS, UNSPECIFIED LOCATION (MULTI): ICD-10-CM

## 2024-11-13 PROCEDURE — 2550000001 HC RX 255 CONTRASTS: Performed by: COLON & RECTAL SURGERY

## 2024-11-13 PROCEDURE — 74177 CT ABD & PELVIS W/CONTRAST: CPT

## 2024-11-18 ENCOUNTER — PATIENT MESSAGE (OUTPATIENT)
Dept: SURGERY | Facility: CLINIC | Age: 54
End: 2024-11-18
Payer: COMMERCIAL

## 2024-11-18 DIAGNOSIS — R18.8 PELVIC FLUID COLLECTION: ICD-10-CM

## 2024-11-19 ENCOUNTER — APPOINTMENT (OUTPATIENT)
Dept: PAIN MEDICINE | Facility: CLINIC | Age: 54
End: 2024-11-19
Payer: COMMERCIAL

## 2024-11-19 DIAGNOSIS — R18.8 PELVIC FLUID COLLECTION: ICD-10-CM

## 2024-11-21 DIAGNOSIS — K52.9 COLITIS: Primary | ICD-10-CM

## 2024-11-22 ENCOUNTER — LAB (OUTPATIENT)
Dept: LAB | Facility: LAB | Age: 54
End: 2024-11-22
Payer: COMMERCIAL

## 2024-11-22 ENCOUNTER — APPOINTMENT (OUTPATIENT)
Dept: GASTROENTEROLOGY | Facility: CLINIC | Age: 54
End: 2024-11-22
Payer: COMMERCIAL

## 2024-11-22 DIAGNOSIS — K52.9 COLITIS: ICD-10-CM

## 2024-11-22 PROCEDURE — 85610 PROTHROMBIN TIME: CPT

## 2024-11-22 PROCEDURE — 36415 COLL VENOUS BLD VENIPUNCTURE: CPT

## 2024-11-23 LAB
INR PPP: 1 (ref 0.9–1.1)
PROTHROMBIN TIME: 11.7 SECONDS (ref 9.8–12.8)

## 2024-11-26 ENCOUNTER — HOSPITAL ENCOUNTER (OUTPATIENT)
Dept: RADIOLOGY | Facility: HOSPITAL | Age: 54
Discharge: HOME | End: 2024-11-26
Payer: COMMERCIAL

## 2024-11-26 VITALS
DIASTOLIC BLOOD PRESSURE: 65 MMHG | OXYGEN SATURATION: 100 % | TEMPERATURE: 99 F | RESPIRATION RATE: 18 BRPM | SYSTOLIC BLOOD PRESSURE: 149 MMHG | HEART RATE: 78 BPM

## 2024-11-26 DIAGNOSIS — R18.8 PELVIC FLUID COLLECTION: ICD-10-CM

## 2024-11-26 PROCEDURE — 76830 TRANSVAGINAL US NON-OB: CPT | Performed by: RADIOLOGY

## 2024-11-26 PROCEDURE — 76830 TRANSVAGINAL US NON-OB: CPT

## 2024-11-26 PROCEDURE — 7100000009 HC PHASE TWO TIME - INITIAL BASE CHARGE

## 2024-11-26 PROCEDURE — 7100000010 HC PHASE TWO TIME - EACH INCREMENTAL 1 MINUTE

## 2024-11-26 ASSESSMENT — PAIN - FUNCTIONAL ASSESSMENT: PAIN_FUNCTIONAL_ASSESSMENT: 0-10

## 2024-11-26 ASSESSMENT — PAIN SCALES - GENERAL: PAINLEVEL_OUTOF10: 7

## 2024-11-26 NOTE — POST-PROCEDURE NOTE
Interventional Radiology Brief Postprocedure Note    Attending: Dr. Shannon Burton    Assistant: Dr. Asher Franklin    Diagnosis:   1. Pelvic fluid collection  US pelvis transvaginal    US pelvis transvaginal    CANCELED: US guided abscess fluid collection drainage    CANCELED: US guided abscess fluid collection drainage        Description of procedure:   The presacral fluid collection was examined under ultrasound. The fluid collection appeared decreased in size compared to prior exam. Please refer to dictation within PACs for further details. The patient didn't have leukocytosis or fever. Due to the location of the fluid collection, a transvaginal approach for the aspiration/drainage would be required. The risks of a transvaginal approach aspiration/drainage seemed higher than the potential benefit, and this was explained to the patient. Aspiration/drainage of the fluid collection was not performed.    Anesthesia:  NA    Complications: None    Estimated Blood Loss: none    Medications (Filter: Administrations occurring from 1021 to 1448 on 11/26/24)      None          See detailed result report with images in PACS.    The patient tolerated the imaging well without incident or complication and is in stable condition.

## 2024-11-26 NOTE — PRE-PROCEDURE NOTE
Interventional Radiology Preprocedure Note    Indication for procedure: The encounter diagnosis was Pelvic fluid collection.    Relevant review of systems: NA    Relevant Labs:   Lab Results   Component Value Date    CREATININE 0.71 11/04/2024    EGFR >90 11/04/2024    INR 1.0 11/22/2024    PROTIME 11.7 11/22/2024       Planned Sedation/Anesthesia: Moderate    Airway assessment: normal    Directed physical examination:    Aox3  Normal rate of respirations    Mallampati: II (hard and soft palate, upper portion of tonsils and uvula visible)    ASA Score: ASA 2 - Patient with mild systemic disease with no functional limitations    Benefits, risks and alternatives of procedure and planned sedation have been discussed with the patient and/or their representative. All questions answered and they agree to proceed.

## 2024-12-02 ENCOUNTER — APPOINTMENT (OUTPATIENT)
Dept: PAIN MEDICINE | Facility: CLINIC | Age: 54
End: 2024-12-02
Payer: COMMERCIAL

## 2024-12-03 ENCOUNTER — APPOINTMENT (OUTPATIENT)
Dept: OBSTETRICS AND GYNECOLOGY | Facility: CLINIC | Age: 54
End: 2024-12-03
Payer: COMMERCIAL

## 2024-12-03 ENCOUNTER — PATIENT MESSAGE (OUTPATIENT)
Dept: SURGERY | Facility: CLINIC | Age: 54
End: 2024-12-03

## 2024-12-04 ENCOUNTER — OFFICE VISIT (OUTPATIENT)
Dept: PAIN MEDICINE | Facility: CLINIC | Age: 54
End: 2024-12-04
Payer: COMMERCIAL

## 2024-12-04 VITALS
HEIGHT: 61 IN | TEMPERATURE: 97 F | WEIGHT: 170 LBS | DIASTOLIC BLOOD PRESSURE: 94 MMHG | OXYGEN SATURATION: 99 % | RESPIRATION RATE: 15 BRPM | BODY MASS INDEX: 32.1 KG/M2 | SYSTOLIC BLOOD PRESSURE: 153 MMHG | HEART RATE: 90 BPM

## 2024-12-04 DIAGNOSIS — K62.89 RECTAL PAIN: Primary | ICD-10-CM

## 2024-12-04 DIAGNOSIS — K51.90 ULCERATIVE COLITIS WITHOUT COMPLICATIONS, UNSPECIFIED LOCATION (MULTI): ICD-10-CM

## 2024-12-04 PROCEDURE — 99214 OFFICE O/P EST MOD 30 MIN: CPT | Performed by: ANESTHESIOLOGY

## 2024-12-04 RX ORDER — GABAPENTIN 300 MG/1
300 CAPSULE ORAL 2 TIMES DAILY
Qty: 60 CAPSULE | Refills: 11 | Status: SHIPPED | OUTPATIENT
Start: 2024-12-04 | End: 2025-01-03

## 2024-12-04 ASSESSMENT — ENCOUNTER SYMPTOMS
OCCASIONAL FEELINGS OF UNSTEADINESS: 0
LOSS OF SENSATION IN FEET: 0

## 2024-12-04 ASSESSMENT — COLUMBIA-SUICIDE SEVERITY RATING SCALE - C-SSRS
2. HAVE YOU ACTUALLY HAD ANY THOUGHTS OF KILLING YOURSELF?: NO
6. HAVE YOU EVER DONE ANYTHING, STARTED TO DO ANYTHING, OR PREPARED TO DO ANYTHING TO END YOUR LIFE?: NO
1. IN THE PAST MONTH, HAVE YOU WISHED YOU WERE DEAD OR WISHED YOU COULD GO TO SLEEP AND NOT WAKE UP?: NO

## 2024-12-04 ASSESSMENT — PAIN SCALES - GENERAL
PAINLEVEL_OUTOF10: 7
PAINLEVEL_OUTOF10: 8

## 2024-12-04 ASSESSMENT — PAIN - FUNCTIONAL ASSESSMENT: PAIN_FUNCTIONAL_ASSESSMENT: 0-10

## 2024-12-04 ASSESSMENT — PAIN DESCRIPTION - DESCRIPTORS: DESCRIPTORS: DISCOMFORT

## 2024-12-04 NOTE — PROGRESS NOTES
History Of Present Illness  Maureen See is a 54 y.o. female presenting with   Chief Complaint   Patient presents with    Pain       Patient presents with complaints of chronic rectal pain. Pt reports she has a hx of UC and is s/p colorectal surgery on for a total proctectomy 2024 with Dr. De Leon. She reports her pain started immediately after the surgery in recovery. The pain is constant, worse with sitting on the toilet and better with walking. Denies LE paresthesias, weakness, saddle anesthesia, bowel or bladder incontinence. To manage this pain the patient has attempted Tramadol / Oxycodone postoperatively. The patients chronic GERD which is stable on medication management.     PAIN SCORE: 7-8/10.    PCP: Dr. JOHNIE Hernandez       Past Medical History  She has a past medical history of Anemia, Anxiety, Asthma, Depression, Familial hypercholesterolemia, Fatty liver, GERD (gastroesophageal reflux disease), Ileostomy in place (Multi) (2023), Inflammatory bowel disease, Iron deficiency anemia, Obesity, Prediabetes, Ulcerative colitis, and Vitamin D deficiency.    Surgical History  She has a past surgical history that includes  section, low transverse (); [place] tooth extraction; Total colectomy (2023); and Flexible sigmoidoscopy (2024).     Social History  She reports that she quit smoking about 9 years ago. Her smoking use included cigarettes. She started smoking about 40 years ago. She has a 15.7 pack-year smoking history. She has never used smokeless tobacco. She reports that she does not currently use alcohol after a past usage of about 1.0 standard drink of alcohol per week. She reports that she does not use drugs.    Family History  Family History   Problem Relation Name Age of Onset    Arthritis Mother Pam Cruz     Hypertension Mother Pam Cruz     Heart disease Father Jordan Cruz     Hypertension Father Jordan Cruz     Colon polyps Father Edlorrie Nacht     Cancer Maternal  Grandmother Morena cid     Diabetes Maternal Grandmother Morena cid     Colon cancer Maternal Grandmother Morena cid     Cancer Paternal Grandfather Tyrell cruz     Cancer Father's Brother Gianfranco Cruz     Diabetes Son Adam Noar     Diabetes Brother Raliegh Cruz     Hypertension Brother Raleigh Cruz         Allergies  Amoxicillin, Aspirin, Azithromycin, Clindamycin, and Sulfamethoxazole    Review of Systems    All other systems reviewed and negative for any deficits. Pertinent positives and negatives were considered in the medical decision making process.        Physical Exam  BP (!) 153/94   Pulse 90   Temp 36.1 °C (97 °F)   Resp 15   Wt 77.1 kg (170 lb)   SpO2 99%     General: Pt appears stated age, sitting uncomfortably    Eyes: Conjunctiva non-icteric and lids without obvious rash or drooping. Pupils are symmetric.    ENT: External ears and nose appear to be without deformity or rash. No lesions or masses noted. Hearing is grossly intact.    Respiratory: No gasping or shortness of breath noted. No use of accessory muscles noted.    CVS: Extremities show no edema or varicosities    Skin: No rashes or open lesions/ulcers identified on skin. No induration/tightening noted with palpation of skin.     Musculoskeletal: [place] is grossly normal.    Stability: No subluxation noted on movement of bilateral upper extremities or head/neck.     Strength: 5/5 in RLE and 5/5 in LLE     Range of Motion: WNL    Neurologic: Reflexes 2+    Sensation: WNL    Neurologic: Cranial Nerves II thru XII are grossly intact    Psychiatric: Pt is alert and oriented to time, person, and place           Assessment/Plan   1. Rectal pain        2. Ulcerative colitis without complications, unspecified location (Multi)  Referral to Pain Medicine            1. I have referred the patient to PELVIC FLOOR physical therapy/aqua therapy to learn and perform exercises to strengthen core/extremties, maintain stabilization, increase range of  motion, and reduce pain.    2. Pt may be a candidate for a Pudendal nerve block to treat pain in the area of the rectum, however, this would likely only cover the superficial skin area and not treat any internal pain. Pt will be referred to OB/GYN dept for this procedure/additional management.     3. I would recommend the pt start on Gabapentin to help with nerve related pain. We discussed the risks, benefits, and side effects to this medication including the mechanism of action and the pt understands and agrees.      I spent time with the patient reviewing their imaging and discussing the risks benefits and alternatives to the above plan. A total of 45 minutes was spent reviewing the data and greater than 50% of that time was with the patient during the face to face encounter discussing treatment options both surgical, non-surgical, and minimally invasive techniques.         Sincerely,     Franky Arizmendi MD, CURTA  Board Certified Anesthesiologist  Board Certified Pain Management Specialist  Clinical Faculty, Department of Anesthesiology and Perioperative Medicine  [employer]     [address]  [employer] [place] [id number]     [facility]  [facility]   [address] [place] [address]    [facility]  [address] [place] [address]     Phone: [phone]  Fax: [phone]

## 2024-12-09 DIAGNOSIS — R10.2 PELVIC PAIN IN FEMALE: Primary | ICD-10-CM

## 2024-12-09 RX ORDER — GABAPENTIN 600 MG/1
600 TABLET ORAL NIGHTLY
Qty: 90 TABLET | Refills: 3 | Status: SHIPPED | OUTPATIENT
Start: 2024-12-09 | End: 2025-03-09

## 2024-12-12 ENCOUNTER — APPOINTMENT (OUTPATIENT)
Dept: PAIN MEDICINE | Facility: CLINIC | Age: 54
End: 2024-12-12
Payer: COMMERCIAL

## 2024-12-17 ENCOUNTER — APPOINTMENT (OUTPATIENT)
Dept: OBSTETRICS AND GYNECOLOGY | Facility: CLINIC | Age: 54
End: 2024-12-17
Payer: COMMERCIAL

## 2024-12-23 ENCOUNTER — APPOINTMENT (OUTPATIENT)
Dept: PRIMARY CARE | Facility: CLINIC | Age: 54
End: 2024-12-23
Payer: COMMERCIAL

## 2024-12-30 ENCOUNTER — APPOINTMENT (OUTPATIENT)
Dept: PRIMARY CARE | Facility: CLINIC | Age: 54
End: 2024-12-30
Payer: COMMERCIAL

## 2024-12-30 ENCOUNTER — HOSPITAL ENCOUNTER (OUTPATIENT)
Facility: HOSPITAL | Age: 54
Setting detail: OBSERVATION
Discharge: HOME | End: 2025-01-01
Attending: EMERGENCY MEDICINE | Admitting: HOSPITALIST
Payer: COMMERCIAL

## 2024-12-30 ENCOUNTER — APPOINTMENT (OUTPATIENT)
Dept: CARDIOLOGY | Facility: HOSPITAL | Age: 54
End: 2024-12-30
Payer: COMMERCIAL

## 2024-12-30 ENCOUNTER — APPOINTMENT (OUTPATIENT)
Dept: RADIOLOGY | Facility: HOSPITAL | Age: 54
End: 2024-12-30
Payer: COMMERCIAL

## 2024-12-30 DIAGNOSIS — R10.9 INTRACTABLE ABDOMINAL PAIN: Primary | ICD-10-CM

## 2024-12-30 DIAGNOSIS — R10.2 PELVIC PAIN IN FEMALE: ICD-10-CM

## 2024-12-30 DIAGNOSIS — R10.84 GENERALIZED ABDOMINAL PAIN: Primary | ICD-10-CM

## 2024-12-30 DIAGNOSIS — Z90.49 HISTORY OF COLECTOMY: ICD-10-CM

## 2024-12-30 LAB
ALBUMIN SERPL BCP-MCNC: 4.3 G/DL (ref 3.4–5)
ALP SERPL-CCNC: 125 U/L (ref 33–110)
ALT SERPL W P-5'-P-CCNC: 33 U/L (ref 7–45)
ANION GAP BLDV CALCULATED.4IONS-SCNC: 10 MMOL/L (ref 10–25)
ANION GAP SERPL CALC-SCNC: 11 MMOL/L (ref 10–20)
APPEARANCE UR: CLEAR
AST SERPL W P-5'-P-CCNC: 25 U/L (ref 9–39)
BASE EXCESS BLDV CALC-SCNC: 2.5 MMOL/L (ref -2–3)
BASOPHILS # BLD AUTO: 0.03 X10*3/UL (ref 0–0.1)
BASOPHILS NFR BLD AUTO: 0.4 %
BILIRUB SERPL-MCNC: 0.4 MG/DL (ref 0–1.2)
BILIRUB UR STRIP.AUTO-MCNC: NEGATIVE MG/DL
BODY TEMPERATURE: 37 DEGREES CELSIUS
BUN SERPL-MCNC: 12 MG/DL (ref 6–23)
CA-I BLDV-SCNC: 1.23 MMOL/L (ref 1.1–1.33)
CALCIUM SERPL-MCNC: 9.5 MG/DL (ref 8.6–10.3)
CHLORIDE BLDV-SCNC: 104 MMOL/L (ref 98–107)
CHLORIDE SERPL-SCNC: 105 MMOL/L (ref 98–107)
CO2 SERPL-SCNC: 28 MMOL/L (ref 21–32)
COLOR UR: COLORLESS
CREAT SERPL-MCNC: 0.73 MG/DL (ref 0.5–1.05)
EGFRCR SERPLBLD CKD-EPI 2021: >90 ML/MIN/1.73M*2
EOSINOPHIL # BLD AUTO: 0.2 X10*3/UL (ref 0–0.7)
EOSINOPHIL NFR BLD AUTO: 2.5 %
ERYTHROCYTE [DISTWIDTH] IN BLOOD BY AUTOMATED COUNT: 14.9 % (ref 11.5–14.5)
GLUCOSE BLDV-MCNC: 93 MG/DL (ref 74–99)
GLUCOSE SERPL-MCNC: 94 MG/DL (ref 74–99)
GLUCOSE UR STRIP.AUTO-MCNC: NORMAL MG/DL
HCO3 BLDV-SCNC: 28.4 MMOL/L (ref 22–26)
HCT VFR BLD AUTO: 39 % (ref 36–46)
HCT VFR BLD EST: 38 % (ref 36–46)
HGB BLD-MCNC: 12.6 G/DL (ref 12–16)
HGB BLDV-MCNC: 12.5 G/DL (ref 12–16)
IMM GRANULOCYTES # BLD AUTO: 0.03 X10*3/UL (ref 0–0.7)
IMM GRANULOCYTES NFR BLD AUTO: 0.4 % (ref 0–0.9)
INHALED O2 CONCENTRATION: 21 %
KETONES UR STRIP.AUTO-MCNC: NEGATIVE MG/DL
LACTATE BLDV-SCNC: 1.2 MMOL/L (ref 0.4–2)
LACTATE SERPL-SCNC: 1.1 MMOL/L (ref 0.4–2)
LEUKOCYTE ESTERASE UR QL STRIP.AUTO: NEGATIVE
LIPASE SERPL-CCNC: 13 U/L (ref 9–82)
LYMPHOCYTES # BLD AUTO: 2.32 X10*3/UL (ref 1.2–4.8)
LYMPHOCYTES NFR BLD AUTO: 28.6 %
MCH RBC QN AUTO: 27.6 PG (ref 26–34)
MCHC RBC AUTO-ENTMCNC: 32.3 G/DL (ref 32–36)
MCV RBC AUTO: 85 FL (ref 80–100)
MONOCYTES # BLD AUTO: 0.59 X10*3/UL (ref 0.1–1)
MONOCYTES NFR BLD AUTO: 7.3 %
NEUTROPHILS # BLD AUTO: 4.93 X10*3/UL (ref 1.2–7.7)
NEUTROPHILS NFR BLD AUTO: 60.8 %
NITRITE UR QL STRIP.AUTO: NEGATIVE
NRBC BLD-RTO: 0 /100 WBCS (ref 0–0)
OXYHGB MFR BLDV: 61.4 % (ref 45–75)
PCO2 BLDV: 48 MM HG (ref 41–51)
PH BLDV: 7.38 PH (ref 7.33–7.43)
PH UR STRIP.AUTO: 7 [PH]
PLATELET # BLD AUTO: 280 X10*3/UL (ref 150–450)
PO2 BLDV: 36 MM HG (ref 35–45)
POTASSIUM BLDV-SCNC: 3.9 MMOL/L (ref 3.5–5.3)
POTASSIUM SERPL-SCNC: 3.8 MMOL/L (ref 3.5–5.3)
PROT SERPL-MCNC: 7.6 G/DL (ref 6.4–8.2)
PROT UR STRIP.AUTO-MCNC: NEGATIVE MG/DL
RBC # BLD AUTO: 4.57 X10*6/UL (ref 4–5.2)
RBC # UR STRIP.AUTO: ABNORMAL /UL
RBC #/AREA URNS AUTO: NORMAL /HPF
SAO2 % BLDV: 63 % (ref 45–75)
SODIUM BLDV-SCNC: 138 MMOL/L (ref 136–145)
SODIUM SERPL-SCNC: 140 MMOL/L (ref 136–145)
SP GR UR STRIP.AUTO: 1.01
SQUAMOUS #/AREA URNS AUTO: NORMAL /HPF
UROBILINOGEN UR STRIP.AUTO-MCNC: NORMAL MG/DL
WBC # BLD AUTO: 8.1 X10*3/UL (ref 4.4–11.3)
WBC #/AREA URNS AUTO: NORMAL /HPF

## 2024-12-30 PROCEDURE — 2500000002 HC RX 250 W HCPCS SELF ADMINISTERED DRUGS (ALT 637 FOR MEDICARE OP, ALT 636 FOR OP/ED): Performed by: HOSPITALIST

## 2024-12-30 PROCEDURE — 85025 COMPLETE CBC W/AUTO DIFF WBC: CPT | Performed by: EMERGENCY MEDICINE

## 2024-12-30 PROCEDURE — 2500000001 HC RX 250 WO HCPCS SELF ADMINISTERED DRUGS (ALT 637 FOR MEDICARE OP): Performed by: HOSPITALIST

## 2024-12-30 PROCEDURE — 84132 ASSAY OF SERUM POTASSIUM: CPT | Performed by: EMERGENCY MEDICINE

## 2024-12-30 PROCEDURE — G0426 INPT/ED TELECONSULT50: HCPCS | Performed by: HOSPITALIST

## 2024-12-30 PROCEDURE — 93005 ELECTROCARDIOGRAM TRACING: CPT

## 2024-12-30 PROCEDURE — 81001 URINALYSIS AUTO W/SCOPE: CPT | Performed by: EMERGENCY MEDICINE

## 2024-12-30 PROCEDURE — 2550000001 HC RX 255 CONTRASTS: Performed by: EMERGENCY MEDICINE

## 2024-12-30 PROCEDURE — 36415 COLL VENOUS BLD VENIPUNCTURE: CPT | Performed by: EMERGENCY MEDICINE

## 2024-12-30 PROCEDURE — 84132 ASSAY OF SERUM POTASSIUM: CPT | Mod: 59 | Performed by: EMERGENCY MEDICINE

## 2024-12-30 PROCEDURE — 96374 THER/PROPH/DIAG INJ IV PUSH: CPT | Mod: 59

## 2024-12-30 PROCEDURE — 96376 TX/PRO/DX INJ SAME DRUG ADON: CPT

## 2024-12-30 PROCEDURE — G0378 HOSPITAL OBSERVATION PER HR: HCPCS

## 2024-12-30 PROCEDURE — 74177 CT ABD & PELVIS W/CONTRAST: CPT

## 2024-12-30 PROCEDURE — 83605 ASSAY OF LACTIC ACID: CPT | Mod: 91 | Performed by: EMERGENCY MEDICINE

## 2024-12-30 PROCEDURE — 96361 HYDRATE IV INFUSION ADD-ON: CPT

## 2024-12-30 PROCEDURE — 96375 TX/PRO/DX INJ NEW DRUG ADDON: CPT

## 2024-12-30 PROCEDURE — 2500000004 HC RX 250 GENERAL PHARMACY W/ HCPCS (ALT 636 FOR OP/ED): Performed by: EMERGENCY MEDICINE

## 2024-12-30 PROCEDURE — 99285 EMERGENCY DEPT VISIT HI MDM: CPT | Mod: 25 | Performed by: EMERGENCY MEDICINE

## 2024-12-30 PROCEDURE — 74177 CT ABD & PELVIS W/CONTRAST: CPT | Performed by: STUDENT IN AN ORGANIZED HEALTH CARE EDUCATION/TRAINING PROGRAM

## 2024-12-30 PROCEDURE — 83690 ASSAY OF LIPASE: CPT | Performed by: EMERGENCY MEDICINE

## 2024-12-30 RX ORDER — OXYCODONE HYDROCHLORIDE 5 MG/1
5 TABLET ORAL EVERY 4 HOURS PRN
Status: DISCONTINUED | OUTPATIENT
Start: 2024-12-30 | End: 2024-12-31

## 2024-12-30 RX ORDER — HYDROMORPHONE HYDROCHLORIDE 1 MG/ML
1 INJECTION, SOLUTION INTRAMUSCULAR; INTRAVENOUS; SUBCUTANEOUS ONCE
Status: COMPLETED | OUTPATIENT
Start: 2024-12-30 | End: 2024-12-30

## 2024-12-30 RX ORDER — PANTOPRAZOLE SODIUM 40 MG/1
40 TABLET, DELAYED RELEASE ORAL
Status: DISCONTINUED | OUTPATIENT
Start: 2024-12-31 | End: 2025-01-01 | Stop reason: HOSPADM

## 2024-12-30 RX ORDER — ROSUVASTATIN CALCIUM 20 MG/1
20 TABLET, COATED ORAL NIGHTLY
Status: DISCONTINUED | OUTPATIENT
Start: 2024-12-30 | End: 2025-01-01 | Stop reason: HOSPADM

## 2024-12-30 RX ORDER — ESCITALOPRAM OXALATE 20 MG/1
20 TABLET ORAL DAILY
Status: DISCONTINUED | OUTPATIENT
Start: 2024-12-31 | End: 2024-12-30

## 2024-12-30 RX ORDER — ACETAMINOPHEN 325 MG/1
650 TABLET ORAL EVERY 6 HOURS PRN
Status: DISCONTINUED | OUTPATIENT
Start: 2024-12-30 | End: 2025-01-01 | Stop reason: HOSPADM

## 2024-12-30 RX ORDER — ONDANSETRON HYDROCHLORIDE 2 MG/ML
4 INJECTION, SOLUTION INTRAVENOUS EVERY 4 HOURS PRN
Status: DISCONTINUED | OUTPATIENT
Start: 2024-12-30 | End: 2025-01-01 | Stop reason: HOSPADM

## 2024-12-30 RX ORDER — ENOXAPARIN SODIUM 100 MG/ML
40 INJECTION SUBCUTANEOUS DAILY
Status: DISCONTINUED | OUTPATIENT
Start: 2024-12-31 | End: 2025-01-01 | Stop reason: HOSPADM

## 2024-12-30 RX ORDER — ALUMINUM HYDROXIDE, MAGNESIUM HYDROXIDE, AND SIMETHICONE 1200; 120; 1200 MG/30ML; MG/30ML; MG/30ML
20 SUSPENSION ORAL EVERY 4 HOURS PRN
Status: DISCONTINUED | OUTPATIENT
Start: 2024-12-30 | End: 2025-01-01 | Stop reason: HOSPADM

## 2024-12-30 RX ORDER — ONDANSETRON HYDROCHLORIDE 2 MG/ML
4 INJECTION, SOLUTION INTRAVENOUS ONCE
Status: DISCONTINUED | OUTPATIENT
Start: 2024-12-30 | End: 2025-01-01 | Stop reason: HOSPADM

## 2024-12-30 RX ORDER — ONDANSETRON HYDROCHLORIDE 2 MG/ML
4 INJECTION, SOLUTION INTRAVENOUS ONCE
Status: COMPLETED | OUTPATIENT
Start: 2024-12-30 | End: 2024-12-30

## 2024-12-30 RX ORDER — GABAPENTIN 300 MG/1
300 CAPSULE ORAL 2 TIMES DAILY
Status: DISCONTINUED | OUTPATIENT
Start: 2024-12-30 | End: 2024-12-31

## 2024-12-30 RX ORDER — HYDROMORPHONE HYDROCHLORIDE 1 MG/ML
1 INJECTION, SOLUTION INTRAMUSCULAR; INTRAVENOUS; SUBCUTANEOUS
Status: DISCONTINUED | OUTPATIENT
Start: 2024-12-30 | End: 2024-12-31

## 2024-12-30 RX ORDER — ESCITALOPRAM OXALATE 20 MG/1
20 TABLET ORAL NIGHTLY
Status: DISCONTINUED | OUTPATIENT
Start: 2024-12-31 | End: 2025-01-01 | Stop reason: HOSPADM

## 2024-12-30 RX ADMIN — HYDROMORPHONE HYDROCHLORIDE 1 MG: 1 INJECTION, SOLUTION INTRAMUSCULAR; INTRAVENOUS; SUBCUTANEOUS at 20:47

## 2024-12-30 RX ADMIN — IOHEXOL 75 ML: 350 INJECTION, SOLUTION INTRAVENOUS at 16:33

## 2024-12-30 RX ADMIN — HYDROMORPHONE HYDROCHLORIDE 0.5 MG: 1 INJECTION, SOLUTION INTRAMUSCULAR; INTRAVENOUS; SUBCUTANEOUS at 15:25

## 2024-12-30 RX ADMIN — ROSUVASTATIN 20 MG: 20 TABLET, FILM COATED ORAL at 23:48

## 2024-12-30 RX ADMIN — OXYCODONE HYDROCHLORIDE 5 MG: 5 TABLET ORAL at 23:49

## 2024-12-30 RX ADMIN — SODIUM CHLORIDE, POTASSIUM CHLORIDE, SODIUM LACTATE AND CALCIUM CHLORIDE 1000 ML: 600; 310; 30; 20 INJECTION, SOLUTION INTRAVENOUS at 15:25

## 2024-12-30 RX ADMIN — ONDANSETRON 4 MG: 2 INJECTION, SOLUTION INTRAMUSCULAR; INTRAVENOUS at 15:25

## 2024-12-30 SDOH — ECONOMIC STABILITY: FOOD INSECURITY: WITHIN THE PAST 12 MONTHS, THE FOOD YOU BOUGHT JUST DIDN'T LAST AND YOU DIDN'T HAVE MONEY TO GET MORE.: NEVER TRUE

## 2024-12-30 SDOH — SOCIAL STABILITY: SOCIAL INSECURITY: DOES ANYONE TRY TO KEEP YOU FROM HAVING/CONTACTING OTHER FRIENDS OR DOING THINGS OUTSIDE YOUR HOME?: NO

## 2024-12-30 SDOH — SOCIAL STABILITY: SOCIAL INSECURITY: WITHIN THE LAST YEAR, HAVE YOU BEEN HUMILIATED OR EMOTIONALLY ABUSED IN OTHER WAYS BY YOUR PARTNER OR EX-PARTNER?: NO

## 2024-12-30 SDOH — SOCIAL STABILITY: SOCIAL INSECURITY
WITHIN THE LAST YEAR, HAVE YOU BEEN RAPED OR FORCED TO HAVE ANY KIND OF SEXUAL ACTIVITY BY YOUR PARTNER OR EX-PARTNER?: NO

## 2024-12-30 SDOH — SOCIAL STABILITY: SOCIAL INSECURITY: HAVE YOU HAD THOUGHTS OF HARMING ANYONE ELSE?: NO

## 2024-12-30 SDOH — SOCIAL STABILITY: SOCIAL INSECURITY: DO YOU FEEL ANYONE HAS EXPLOITED OR TAKEN ADVANTAGE OF YOU FINANCIALLY OR OF YOUR PERSONAL PROPERTY?: NO

## 2024-12-30 SDOH — SOCIAL STABILITY: SOCIAL INSECURITY
WITHIN THE LAST YEAR, HAVE YOU BEEN KICKED, HIT, SLAPPED, OR OTHERWISE PHYSICALLY HURT BY YOUR PARTNER OR EX-PARTNER?: NO

## 2024-12-30 SDOH — SOCIAL STABILITY: SOCIAL INSECURITY: ARE THERE ANY APPARENT SIGNS OF INJURIES/BEHAVIORS THAT COULD BE RELATED TO ABUSE/NEGLECT?: NO

## 2024-12-30 SDOH — ECONOMIC STABILITY: FOOD INSECURITY: WITHIN THE PAST 12 MONTHS, YOU WORRIED THAT YOUR FOOD WOULD RUN OUT BEFORE YOU GOT THE MONEY TO BUY MORE.: NEVER TRUE

## 2024-12-30 SDOH — ECONOMIC STABILITY: INCOME INSECURITY: IN THE PAST 12 MONTHS HAS THE ELECTRIC, GAS, OIL, OR WATER COMPANY THREATENED TO SHUT OFF SERVICES IN YOUR HOME?: NO

## 2024-12-30 SDOH — SOCIAL STABILITY: SOCIAL INSECURITY: WITHIN THE LAST YEAR, HAVE YOU BEEN AFRAID OF YOUR PARTNER OR EX-PARTNER?: NO

## 2024-12-30 SDOH — SOCIAL STABILITY: SOCIAL INSECURITY: DO YOU FEEL UNSAFE GOING BACK TO THE PLACE WHERE YOU ARE LIVING?: NO

## 2024-12-30 SDOH — SOCIAL STABILITY: SOCIAL INSECURITY: HAVE YOU HAD ANY THOUGHTS OF HARMING ANYONE ELSE?: NO

## 2024-12-30 SDOH — SOCIAL STABILITY: SOCIAL INSECURITY: ARE YOU OR HAVE YOU BEEN THREATENED OR ABUSED PHYSICALLY, EMOTIONALLY, OR SEXUALLY BY ANYONE?: NO

## 2024-12-30 SDOH — SOCIAL STABILITY: SOCIAL INSECURITY: WERE YOU ABLE TO COMPLETE ALL THE BEHAVIORAL HEALTH SCREENINGS?: YES

## 2024-12-30 SDOH — SOCIAL STABILITY: SOCIAL INSECURITY: ABUSE: ADULT

## 2024-12-30 SDOH — SOCIAL STABILITY: SOCIAL INSECURITY: HAS ANYONE EVER THREATENED TO HURT YOUR FAMILY OR YOUR PETS?: NO

## 2024-12-30 ASSESSMENT — PAIN DESCRIPTION - LOCATION
LOCATION: ABDOMEN

## 2024-12-30 ASSESSMENT — COGNITIVE AND FUNCTIONAL STATUS - GENERAL
PATIENT BASELINE BEDBOUND: NO
MOBILITY SCORE: 24
DAILY ACTIVITIY SCORE: 24

## 2024-12-30 ASSESSMENT — LIFESTYLE VARIABLES
HOW OFTEN DO YOU HAVE A DRINK CONTAINING ALCOHOL: NEVER
AUDIT-C TOTAL SCORE: 0
AUDIT-C TOTAL SCORE: 0
SKIP TO QUESTIONS 9-10: 1
HOW MANY STANDARD DRINKS CONTAINING ALCOHOL DO YOU HAVE ON A TYPICAL DAY: PATIENT DOES NOT DRINK
HOW OFTEN DO YOU HAVE 6 OR MORE DRINKS ON ONE OCCASION: NEVER

## 2024-12-30 ASSESSMENT — PAIN SCALES - GENERAL
PAINLEVEL_OUTOF10: 4
PAINLEVEL_OUTOF10: 5 - MODERATE PAIN
PAINLEVEL_OUTOF10: 0 - NO PAIN
PAINLEVEL_OUTOF10: 8
PAINLEVEL_OUTOF10: 9
PAINLEVEL_OUTOF10: 9

## 2024-12-30 ASSESSMENT — PAIN DESCRIPTION - ORIENTATION
ORIENTATION: RIGHT;LOWER
ORIENTATION: RIGHT

## 2024-12-30 ASSESSMENT — ACTIVITIES OF DAILY LIVING (ADL)
ADEQUATE_TO_COMPLETE_ADL: YES
FEEDING YOURSELF: INDEPENDENT
BATHING: INDEPENDENT
GROOMING: INDEPENDENT
JUDGMENT_ADEQUATE_SAFELY_COMPLETE_DAILY_ACTIVITIES: YES
WALKS IN HOME: INDEPENDENT
DRESSING YOURSELF: INDEPENDENT
PATIENT'S MEMORY ADEQUATE TO SAFELY COMPLETE DAILY ACTIVITIES?: YES
HEARING - LEFT EAR: FUNCTIONAL
HEARING - RIGHT EAR: FUNCTIONAL
TOILETING: INDEPENDENT
LACK_OF_TRANSPORTATION: NO

## 2024-12-30 ASSESSMENT — PAIN DESCRIPTION - PROGRESSION: CLINICAL_PROGRESSION: RAPIDLY IMPROVING

## 2024-12-30 ASSESSMENT — PAIN DESCRIPTION - PAIN TYPE: TYPE: ACUTE PAIN

## 2024-12-30 ASSESSMENT — PATIENT HEALTH QUESTIONNAIRE - PHQ9
2. FEELING DOWN, DEPRESSED OR HOPELESS: NOT AT ALL
SUM OF ALL RESPONSES TO PHQ9 QUESTIONS 1 & 2: 0
1. LITTLE INTEREST OR PLEASURE IN DOING THINGS: NOT AT ALL

## 2024-12-30 ASSESSMENT — PAIN - FUNCTIONAL ASSESSMENT
PAIN_FUNCTIONAL_ASSESSMENT: 0-10
PAIN_FUNCTIONAL_ASSESSMENT: 0-10

## 2024-12-30 NOTE — ED PROVIDER NOTES
Emergency Department Provider Note             History of Present Illness   CC: Abdominal Pain    History provided by: Patient  Limitations to History: None  External Records Reviewed: prior discharge summary, surgery notes    HPI:  Maureen See is a 54 y.o. female with history including ulcerative colitis status post laparoscopic total colectomy, end ileostomy in 2023 and robotic proctectomy 2024 presenting to the emergency department for acute on chronic abdominal pain near stoma as well as deep pelvic pain. States she's had pain since the surgery but it's worsened over the past few days. Denies fever, chills, vomiting, change in ostomy output, urinary symptoms, vaginal bleeding or atypical discharge, cough, dyspnea.     ---  Past Medical History:   Diagnosis Date    Anemia     Anxiety     Asthma     Depression     Familial hypercholesterolemia     Fatty liver     GERD (gastroesophageal reflux disease)     Ileostomy in place (Multi) 2023    Inflammatory bowel disease     Iron deficiency anemia     Obesity     Prediabetes     2023 A1C 5.5%    Ulcerative colitis     Vitamin D deficiency      Past Surgical History:   Procedure Laterality Date     SECTION, LOW TRANSVERSE  2000    10/14/1992 & 2000    FLEXIBLE SIGMOIDOSCOPY  2024    TOTAL COLECTOMY  2023 OPERATION/PROCEDURE: Single-incision laparoscopic total abdominal colectomy with end ileostomy    WISDOM TOOTH EXTRACTION         Allergies   Allergen Reactions    Amoxicillin Hives, Rash and Unknown    Aspirin GI Upset, Nausea/vomiting and Unknown    Azithromycin Nausea/vomiting and Rash    Clindamycin Rash and Unknown    Sulfamethoxazole Hives       Physical Exam   Triage vitals:  T 36.4 °C (97.6 °F)  HR 82  BP (!) 164/98  RR 18  O2 100 % None (Room air)    General: awake, well-appearing, no distress  Head: normocephalic, atraumatic  Eyes: pupils equal, extraocular movements grossly intact, no conjunctival  injection or scleral icterus  ENT: nares patent, moist mucous membranes  Neck: supple, trachea midline, no masses  CV: regular rate and rhythm, well-perfused  Resp: breathing is non-labored, speaking in full sentences. Lungs are clear to auscultation bilaterally  GI: soft, non-distended, diffuse tenderness worse around stoma, no rebound or guarding  : No gross rectal or vaginal bleeding or drainage noted. Skin is intact and no fluctuance, crepitus, erythema, purulence.  Extremities: no edema, no gross deformity   Neuro: alert, oriented, speech is fluent, face is symmetric, moving all extremities   Psych: Appropriate mood and affect    ED Course & Medical Decision Making     53-year-old female with history including ulcerative colitis status post laparoscopic total colectomy, end ileostomy in 5/2023 and robotic proctectomy 6/2024 presenting to the emergency department for acute on chronic abdominal pain near stoma as well as deep pelvic pain. She's uncomfortable-appearing but no distress. She has abdominal tenderness but no signs of peritonitis. Scant stool in ostomy bag. She was given symptomatic management. See ED course for results.     She remains stable but continues to have pain. Given the relatively unchanged fluid collection, infection seems less likely, at least to require urgent intervention. Will admit for pain control.     Social Determinants Limiting Care: None identified    EKG: See ED course.     Results: Independently reviewed and interpreted by me. Please see ED course and [facility] for my full interpretation.     Chronic Medical Conditions Significantly Affecting Care: as per MDM    Patient was discussed with the following consultants/services: admitting team    Care Considerations: as per [facility]    ED Course as of 12/30/24 1945   Mon Dec 30, 2024   1523 EKG per my interpretation reveals normal sinus rhythm, rate 62, normal axis, normal intervals, no ST elevation/depression or T wave abnormalities [LM]    1619 Labs are unremarkable. [LM]   1833 CT shows postsurgical changes but no obstruction, infection, or other acute process. There is stable presacral fluid collection.  [LM]      ED Course User Index  [LM] Debi Miranda MD         Diagnoses as of 12/30/24 1945   Intractable abdominal pain   History of colectomy       Disposition   Admission    MD Debi Watson MD  01/02/25 3917

## 2024-12-30 NOTE — LETTER
January 1, 2025     Patient: Maureen See   YOB: 1970   Date of Visit: 12/30/2024-01/01/2024       To Whom It May Concern:    Maureen See was seen at the hospital on 12/30/2024-01/01/2025 at . Please excuse Maureen for her absence from work on these days    If you have any questions or concerns, please don't hesitate to call.         Sincerely,         Cata Daigle PA-C

## 2024-12-30 NOTE — LETTER
January 1, 2025     Patient: Maureen See   YOB: 1970   Date of Visit: 12/30/2024-01/01/2024       To Whom It May Concern:    Maureen See was seen in the hospital on 12/30/2024- 01/01/2024. Please excuse Maureen for her absence from work on these days.    If you have any questions or concerns, please don't hesitate to call.         Sincerely,         Cata Daigle PA-C

## 2024-12-30 NOTE — ED TRIAGE NOTES
Pt presents to the ED from home with c/o pain at colostomy site and back. Pt states she is having severe pain at the colostomy site on the R and having incision pain on the L side from a proctectomy she had done in June. Pt hasn't taken any pain meds recently. Pt was found to have a cyst in pelvis after a CT, and was supposed to have fluid removed in IR, but they were unable and sent her to pain management and was given gabapentin but no relief.

## 2024-12-31 ENCOUNTER — APPOINTMENT (OUTPATIENT)
Dept: OBSTETRICS AND GYNECOLOGY | Facility: CLINIC | Age: 54
End: 2024-12-31
Payer: COMMERCIAL

## 2024-12-31 LAB
ANION GAP SERPL CALC-SCNC: 10 MMOL/L (ref 10–20)
APPEARANCE UR: CLEAR
BILIRUB UR STRIP.AUTO-MCNC: NEGATIVE MG/DL
BUN SERPL-MCNC: 16 MG/DL (ref 6–23)
CALCIUM SERPL-MCNC: 8.9 MG/DL (ref 8.6–10.3)
CHLORIDE SERPL-SCNC: 106 MMOL/L (ref 98–107)
CO2 SERPL-SCNC: 30 MMOL/L (ref 21–32)
COLOR UR: ABNORMAL
CREAT SERPL-MCNC: 0.93 MG/DL (ref 0.5–1.05)
CRP SERPL-MCNC: 0.61 MG/DL
EGFRCR SERPLBLD CKD-EPI 2021: 74 ML/MIN/1.73M*2
GLUCOSE SERPL-MCNC: 98 MG/DL (ref 74–99)
GLUCOSE UR STRIP.AUTO-MCNC: NORMAL MG/DL
HOLD SPECIMEN: NORMAL
KETONES UR STRIP.AUTO-MCNC: NEGATIVE MG/DL
LEUKOCYTE ESTERASE UR QL STRIP.AUTO: ABNORMAL
MAGNESIUM SERPL-MCNC: 2.11 MG/DL (ref 1.6–2.4)
MUCOUS THREADS #/AREA URNS AUTO: NORMAL /LPF
NITRITE UR QL STRIP.AUTO: NEGATIVE
PH UR STRIP.AUTO: 5.5 [PH]
POTASSIUM SERPL-SCNC: 3.9 MMOL/L (ref 3.5–5.3)
PROT UR STRIP.AUTO-MCNC: NEGATIVE MG/DL
RBC # UR STRIP.AUTO: ABNORMAL /UL
RBC #/AREA URNS AUTO: NORMAL /HPF
SODIUM SERPL-SCNC: 142 MMOL/L (ref 136–145)
SP GR UR STRIP.AUTO: 1.01
SQUAMOUS #/AREA URNS AUTO: NORMAL /HPF
UROBILINOGEN UR STRIP.AUTO-MCNC: NORMAL MG/DL
WBC #/AREA URNS AUTO: NORMAL /HPF

## 2024-12-31 PROCEDURE — 2500000002 HC RX 250 W HCPCS SELF ADMINISTERED DRUGS (ALT 637 FOR MEDICARE OP, ALT 636 FOR OP/ED): Performed by: HOSPITALIST

## 2024-12-31 PROCEDURE — 96376 TX/PRO/DX INJ SAME DRUG ADON: CPT

## 2024-12-31 PROCEDURE — 99232 SBSQ HOSP IP/OBS MODERATE 35: CPT

## 2024-12-31 PROCEDURE — 86140 C-REACTIVE PROTEIN: CPT | Performed by: INTERNAL MEDICINE

## 2024-12-31 PROCEDURE — 2500000004 HC RX 250 GENERAL PHARMACY W/ HCPCS (ALT 636 FOR OP/ED): Performed by: INTERNAL MEDICINE

## 2024-12-31 PROCEDURE — 87086 URINE CULTURE/COLONY COUNT: CPT | Mod: AHULAB | Performed by: INTERNAL MEDICINE

## 2024-12-31 PROCEDURE — 96375 TX/PRO/DX INJ NEW DRUG ADDON: CPT

## 2024-12-31 PROCEDURE — 2500000001 HC RX 250 WO HCPCS SELF ADMINISTERED DRUGS (ALT 637 FOR MEDICARE OP): Performed by: HOSPITALIST

## 2024-12-31 PROCEDURE — 83735 ASSAY OF MAGNESIUM: CPT | Performed by: HOSPITALIST

## 2024-12-31 PROCEDURE — 2500000004 HC RX 250 GENERAL PHARMACY W/ HCPCS (ALT 636 FOR OP/ED)

## 2024-12-31 PROCEDURE — G0378 HOSPITAL OBSERVATION PER HR: HCPCS

## 2024-12-31 PROCEDURE — 81003 URINALYSIS AUTO W/O SCOPE: CPT | Performed by: INTERNAL MEDICINE

## 2024-12-31 PROCEDURE — 80048 BASIC METABOLIC PNL TOTAL CA: CPT | Performed by: HOSPITALIST

## 2024-12-31 PROCEDURE — 36415 COLL VENOUS BLD VENIPUNCTURE: CPT | Performed by: HOSPITALIST

## 2024-12-31 PROCEDURE — 2500000004 HC RX 250 GENERAL PHARMACY W/ HCPCS (ALT 636 FOR OP/ED): Performed by: HOSPITALIST

## 2024-12-31 PROCEDURE — 96361 HYDRATE IV INFUSION ADD-ON: CPT

## 2024-12-31 PROCEDURE — 2500000001 HC RX 250 WO HCPCS SELF ADMINISTERED DRUGS (ALT 637 FOR MEDICARE OP): Performed by: INTERNAL MEDICINE

## 2024-12-31 RX ORDER — OXYCODONE HYDROCHLORIDE 5 MG/1
10 TABLET ORAL EVERY 8 HOURS PRN
Status: DISCONTINUED | OUTPATIENT
Start: 2024-12-31 | End: 2025-01-01 | Stop reason: HOSPADM

## 2024-12-31 RX ORDER — GABAPENTIN 300 MG/1
300 CAPSULE ORAL ONCE
Status: COMPLETED | OUTPATIENT
Start: 2024-12-31 | End: 2024-12-31

## 2024-12-31 RX ORDER — OXYCODONE HYDROCHLORIDE 10 MG/1
10 TABLET ORAL 2 TIMES DAILY PRN
Qty: 10 TABLET | Refills: 0 | Status: SHIPPED | OUTPATIENT
Start: 2024-12-31 | End: 2025-01-03 | Stop reason: WASHOUT

## 2024-12-31 RX ORDER — GABAPENTIN 600 MG/1
300 TABLET ORAL 3 TIMES DAILY
Qty: 45 TABLET | Refills: 2 | Status: SHIPPED | OUTPATIENT
Start: 2024-12-31 | End: 2025-03-31

## 2024-12-31 RX ORDER — KETOROLAC TROMETHAMINE 30 MG/ML
30 INJECTION, SOLUTION INTRAMUSCULAR; INTRAVENOUS ONCE
Status: COMPLETED | OUTPATIENT
Start: 2024-12-31 | End: 2024-12-31

## 2024-12-31 RX ORDER — KETOROLAC TROMETHAMINE 30 MG/ML
30 INJECTION, SOLUTION INTRAMUSCULAR; INTRAVENOUS EVERY 6 HOURS
Status: DISCONTINUED | OUTPATIENT
Start: 2024-12-31 | End: 2024-12-31

## 2024-12-31 RX ORDER — KETOROLAC TROMETHAMINE 30 MG/ML
30 INJECTION, SOLUTION INTRAMUSCULAR; INTRAVENOUS EVERY 6 HOURS PRN
Status: DISCONTINUED | OUTPATIENT
Start: 2024-12-31 | End: 2025-01-01

## 2024-12-31 RX ADMIN — GABAPENTIN 300 MG: 300 CAPSULE ORAL at 18:03

## 2024-12-31 RX ADMIN — ESCITALOPRAM OXALATE 20 MG: 20 TABLET ORAL at 00:35

## 2024-12-31 RX ADMIN — HYDROMORPHONE HYDROCHLORIDE 0.4 MG: 1 INJECTION, SOLUTION INTRAMUSCULAR; INTRAVENOUS; SUBCUTANEOUS at 18:03

## 2024-12-31 RX ADMIN — SODIUM CHLORIDE 500 ML: 9 INJECTION, SOLUTION INTRAVENOUS at 18:03

## 2024-12-31 RX ADMIN — ROSUVASTATIN 20 MG: 20 TABLET, FILM COATED ORAL at 20:22

## 2024-12-31 RX ADMIN — ESCITALOPRAM OXALATE 20 MG: 20 TABLET ORAL at 20:22

## 2024-12-31 RX ADMIN — PANTOPRAZOLE SODIUM 40 MG: 40 TABLET, DELAYED RELEASE ORAL at 16:41

## 2024-12-31 RX ADMIN — HYDROMORPHONE HYDROCHLORIDE 1 MG: 1 INJECTION, SOLUTION INTRAMUSCULAR; INTRAVENOUS; SUBCUTANEOUS at 08:48

## 2024-12-31 RX ADMIN — PANTOPRAZOLE SODIUM 40 MG: 40 TABLET, DELAYED RELEASE ORAL at 06:04

## 2024-12-31 RX ADMIN — OXYCODONE HYDROCHLORIDE 5 MG: 5 TABLET ORAL at 06:04

## 2024-12-31 RX ADMIN — KETOROLAC TROMETHAMINE 30 MG: 30 INJECTION, SOLUTION INTRAMUSCULAR at 13:55

## 2024-12-31 ASSESSMENT — COGNITIVE AND FUNCTIONAL STATUS - GENERAL
MOBILITY SCORE: 24
DAILY ACTIVITIY SCORE: 24

## 2024-12-31 ASSESSMENT — PAIN DESCRIPTION - ORIENTATION
ORIENTATION: RIGHT

## 2024-12-31 ASSESSMENT — PAIN DESCRIPTION - LOCATION
LOCATION: ABDOMEN

## 2024-12-31 ASSESSMENT — PAIN SCALES - GENERAL
PAINLEVEL_OUTOF10: 3
PAINLEVEL_OUTOF10: 6
PAINLEVEL_OUTOF10: 8
PAINLEVEL_OUTOF10: 6
PAINLEVEL_OUTOF10: 6
PAINLEVEL_OUTOF10: 8

## 2024-12-31 ASSESSMENT — PAIN - FUNCTIONAL ASSESSMENT: PAIN_FUNCTIONAL_ASSESSMENT: 0-10

## 2024-12-31 ASSESSMENT — PAIN SCALES - WONG BAKER
WONGBAKER_NUMERICALRESPONSE: HURTS WHOLE LOT
WONGBAKER_NUMERICALRESPONSE: NO HURT
WONGBAKER_NUMERICALRESPONSE: HURTS EVEN MORE
WONGBAKER_NUMERICALRESPONSE: HURTS WHOLE LOT

## 2024-12-31 ASSESSMENT — PAIN SCALES - PAIN ASSESSMENT IN ADVANCED DEMENTIA (PAINAD): TOTALSCORE: MEDICATION (SEE MAR)

## 2024-12-31 NOTE — HOSPITAL COURSE
Maureen See is a 54 y.o. female with history of ulcerative colitis status post laparoscopic total colectomy with end ileostomy, and proctectomy presents to the [facility] emergency department with worsening chronic abdominal pain.  Pain is located near her stoma and deep pelvic area.  Pain is constant and sharp. Patient states she has had pain since her last surgery in June 2024.      On arrival to [facility] ER, temperature 36.4, pulse 82, respiratory 18, blood pressure 164/98, saturation 100% room air.  Lab work was unremarkable.  Urinalysis negative.  Patient underwent CT abdomen pelvis with IV contrast which which showed postsurgical changes without evidence of inflammation obstruction or bowel wall thickening, and unchanged presacral fluid collection.     In the ER she was given Zofran 4 mg, lactated Ringer's 1 L, and Dilaudid 1 mg and 0.5 mg IV.  Patient was requested be admitted for further management of her acute on chronic abdominal pain.     Patient had a follow-up with colorectal surgery on 10/29 regarding her pain.  She was then referred to pain management, which she recently saw on 12/4.  She was given prescription for gabapentin and was recommended for possible pudendal nerve block. Patient states that the gabapentin is not helping her pain. Patient states that her pain is affecting her activity of daily living.     Observation Course: ***      Discharge weight: ### kg    After all labs and VS were reviewed the decision was made that the patient was medically stable for discharge.  The patient was discharged in satisfactory condition.    More than 30 minutes were spent in coordinating patient discharge.

## 2024-12-31 NOTE — PROGRESS NOTES
12/31/24 1445   Discharge Planning   Living Arrangements Spouse/significant other;Children   Support Systems Spouse/significant other;Children   Assistance Needed none   Type of Residence Private residence   Number of Stairs to Enter Residence 3   Number of Stairs Within Residence 0   Home or Post Acute Services None   Expected Discharge Disposition Home   Does the patient need discharge transport arranged? Yes     Plan per Medical/Surgical team: pain control, wound/ostomy nurse  Status: observation  Payor source: [place]  Discharge disposition: Home--no home going needs identified  Potential Barriers: pain  ADOD: 1/1/25

## 2024-12-31 NOTE — PROGRESS NOTES
Pharmacy Medication History     Source of Information: Per Patient     Additional concerns with the patient's PTA list.   N/A  The following updates were made to the Prior to Admission medication list:     Medications ADDED:   N/A  Medications CHANGED:  N/A  Medications REMOVED:   N/A  Medications NOT TAKING:   Gabapentin 600 mg     Allergy reviewed : Yes    Meds 2 Beds : Yes    Outpatient pharmacy confirmed and updated in chart : Yes    Pharmacy name: [facility] ( [place])    The list below reflectives the updated PTA list. Please review each medication in order reconciliation for additional clarification and justification.    Prior to Admission Medications   Prescriptions Last Dose Informant   escitalopram (Lexapro) 20 mg tablet 12/29/2024    Sig: Take 1 tablet (20 mg) by mouth once daily at bedtime.   esomeprazole (NexIUM) 40 mg DR capsule     Sig: Take 1 capsule (40 mg) by mouth 2 times a day. Do not open capsule.   gabapentin (Neurontin) 600 mg tablet Not Taking    Sig: Take 1 tablet (600 mg) by mouth once daily at bedtime.   Patient not taking: Reported on 12/31/2024   norethindrone (Micronor) 0.35 mg tablet 12/31/2024    Sig: Take 1 tablet (0.35 mg) over 28 days by mouth once daily for 120 doses.   rosuvastatin (Crestor) 20 mg tablet 12/29/2024    Sig: TAKE 1 TABLET BY MOUTH EVERY DAY      Facility-Administered Medications: None       The list below reflectives the updated allergy list. Please review each documented allergy for additional clarification and justification.    Allergies   Allergen Reactions    Amoxicillin Hives, Rash and Unknown    Aspirin GI Upset, Nausea/vomiting and Unknown    Azithromycin Nausea/vomiting and Rash    Clindamycin Rash and Unknown    Sulfamethoxazole Hives          12/31/24 at 4:14 PM - Mojgan Conn

## 2024-12-31 NOTE — CARE PLAN
The patient's goals for the shift include Pain control    The clinical goals for the shift include remain HDS    Over the shift, the patient did not make progress toward the following goals. Barriers to progression include  Recommendations to address these barriers include       Problem: Pain - Adult  Goal: Verbalizes/displays adequate comfort level or baseline comfort level  Outcome: Progressing     Problem: Discharge Planning  Goal: Discharge to home or other facility with appropriate resources  Outcome: Progressing     Problem: Chronic Conditions and Co-morbidities  Goal: Patient's chronic conditions and co-morbidity symptoms are monitored and maintained or improved  Outcome: Progressing

## 2024-12-31 NOTE — H&P
History Of Present Illness  Maureen See is a 54 y.o. female with history of ulcerative colitis status post laparoscopic total colectomy with end ileostomy, and proctectomy presents to the [facility] emergency department with worsening chronic abdominal pain.  Pain is located near her stoma and deep pelvic area.  Pain is constant and sharp. Patient states she has had pain since her last surgery in 2024.     On arrival to [facility] ER, temperature 36.4, pulse 82, respiratory 18, blood pressure 164/98, saturation 100% room air.  Lab work was unremarkable.  Urinalysis negative.  Patient underwent CT abdomen pelvis with IV contrast which which showed postsurgical changes without evidence of inflammation obstruction or bowel wall thickening, and unchanged presacral fluid collection.    In the ER she was given Zofran 4 mg, lactated Ringer's 1 L, and Dilaudid 1 mg and 0.5 mg IV.  Patient was requested be admitted for further management of her acute on chronic abdominal pain.    Patient had a follow-up with colorectal surgery on 10/29 regarding her pain.  She was then referred to pain management, which she recently saw on .  She was given prescription for gabapentin and was recommended for possible pudendal nerve block. Patient states that the gabapentin is not helping her pain. Patient states that her pain is affecting her activity of daily living.        Past Medical History  She has a past medical history of Anemia, Anxiety, Asthma, Depression, Familial hypercholesterolemia, Fatty liver, GERD (gastroesophageal reflux disease), Ileostomy in place (Multi) (2023), Inflammatory bowel disease, Iron deficiency anemia, Obesity, Prediabetes, Ulcerative colitis, and Vitamin D deficiency.    Surgical History  She has a past surgical history that includes  section, low transverse (); [place] tooth extraction; Total colectomy (2023); and Flexible sigmoidoscopy (2024).     Social History  She reports  that she quit smoking about 9 years ago. Her smoking use included cigarettes. She started smoking about 40 years ago. She has a 15.7 pack-year smoking history. She has never used smokeless tobacco. She reports that she does not currently use alcohol after a past usage of about 1.0 standard drink of alcohol per week. She reports that she does not use drugs.    Family History  Family History   Problem Relation Name Age of Onset    Arthritis Mother Pam Cruz     Hypertension Mother Pam Cruz     Heart disease Father Jordan Cruz     Hypertension Father Jordan Cruz     Colon polyps Father Jordan Cruz     Cancer Maternal Grandmother Morena cid     Diabetes Maternal Grandmother Morena cid     Colon cancer Maternal Grandmother Morena cid     Cancer Paternal Grandfather Tyrell cruz     Cancer Father's Brother Gianfranco Cruz     Diabetes Son Adam Wetzel     Diabetes Brother Raleigh Cruz     Hypertension Brother Raleigh Cruz         Allergies  Amoxicillin, Aspirin, Azithromycin, Clindamycin, and Sulfamethoxazole    Review of Systems  10 point organ system reviewed, pertinent positive mentioned HPI, others negative.    Last Recorded Vitals  /86   Pulse 80   Temp 36.4 °C (97.6 °F) (Temporal)   Resp 15   Wt 79.4 kg (175 lb)   SpO2 100%     Relevant Results  Results for orders placed or performed during the hospital encounter of 12/30/24 (from the past 24 hours)   CBC and Auto Differential   Result Value Ref Range    WBC 8.1 4.4 - 11.3 x10*3/uL    nRBC 0.0 0.0 - 0.0 /100 WBCs    RBC 4.57 4.00 - 5.20 x10*6/uL    Hemoglobin 12.6 12.0 - 16.0 g/dL    Hematocrit 39.0 36.0 - 46.0 %    MCV 85 80 - 100 fL    MCH 27.6 26.0 - 34.0 pg    MCHC 32.3 32.0 - 36.0 g/dL    RDW 14.9 (H) 11.5 - 14.5 %    Platelets 280 150 - 450 x10*3/uL    Neutrophils % 60.8 40.0 - 80.0 %    Immature Granulocytes %, Automated 0.4 0.0 - 0.9 %    Lymphocytes % 28.6 13.0 - 44.0 %    Monocytes % 7.3 2.0 - 10.0 %    Eosinophils % 2.5 0.0 - 6.0 %     Basophils % 0.4 0.0 - 2.0 %    Neutrophils Absolute 4.93 1.20 - 7.70 x10*3/uL    Immature Granulocytes Absolute, Automated 0.03 0.00 - 0.70 x10*3/uL    Lymphocytes Absolute 2.32 1.20 - 4.80 x10*3/uL    Monocytes Absolute 0.59 0.10 - 1.00 x10*3/uL    Eosinophils Absolute 0.20 0.00 - 0.70 x10*3/uL    Basophils Absolute 0.03 0.00 - 0.10 x10*3/uL   Comprehensive metabolic panel   Result Value Ref Range    Glucose 94 74 - 99 mg/dL    Sodium 140 136 - 145 mmol/L    Potassium 3.8 3.5 - 5.3 mmol/L    Chloride 105 98 - 107 mmol/L    Bicarbonate 28 21 - 32 mmol/L    Anion Gap 11 10 - 20 mmol/L    Urea Nitrogen 12 6 - 23 mg/dL    Creatinine 0.73 0.50 - 1.05 mg/dL    eGFR >90 >60 mL/min/1.73m*2    Calcium 9.5 8.6 - 10.3 mg/dL    Albumin 4.3 3.4 - 5.0 g/dL    Alkaline Phosphatase 125 (H) 33 - 110 U/L    Total Protein 7.6 6.4 - 8.2 g/dL    AST 25 9 - 39 U/L    Bilirubin, Total 0.4 0.0 - 1.2 mg/dL    ALT 33 7 - 45 U/L   Lactate   Result Value Ref Range    Lactate 1.1 0.4 - 2.0 mmol/L   Blood Gas Venous Full Panel   Result Value Ref Range    POCT pH, Venous 7.38 7.33 - 7.43 pH    POCT pCO2, Venous 48 41 - 51 mm Hg    POCT pO2, Venous 36 35 - 45 mm Hg    POCT SO2, Venous 63 45 - 75 %    POCT Oxy Hemoglobin, Venous 61.4 45.0 - 75.0 %    POCT Hematocrit Calculated, Venous 38.0 36.0 - 46.0 %    POCT Sodium, Venous 138 136 - 145 mmol/L    POCT Potassium, Venous 3.9 3.5 - 5.3 mmol/L    POCT Chloride, Venous 104 98 - 107 mmol/L    POCT Ionized [place], Venous 1.23 1.10 - 1.33 mmol/L    POCT Glucose, Venous 93 74 - 99 mg/dL    POCT Lactate, Venous 1.2 0.4 - 2.0 mmol/L    POCT Base Excess, Venous 2.5 -2.0 - 3.0 mmol/L    POCT HCO3 Calculated, Venous 28.4 (H) 22.0 - 26.0 mmol/L    POCT Hemoglobin, Venous 12.5 12.0 - 16.0 g/dL    POCT Anion Gap, Venous 10.0 10.0 - 25.0 mmol/L    Patient Temperature 37.0 degrees Celsius    FiO2 21 %   Lipase   Result Value Ref Range    Lipase 13 9 - 82 U/L   ECG 12 lead   Result Value Ref Range     Ventricular Rate 62 BPM    Atrial Rate 62 BPM    CT Interval 136 ms    QRS Duration 80 ms    QT Interval 444 ms    QTC Calculation(Bazett) 450 ms    P Axis 73 degrees    R Axis 33 degrees    T Axis 41 degrees    QRS Count 10 beats    Q Onset 219 ms    P Onset 151 ms    P Offset 196 ms    T Offset 441 ms    QTC Fredericia 449 ms   Urinalysis with Reflex Culture and Microscopic   Result Value Ref Range    Color, Urine Colorless (N) Light-Yellow, Yellow, Dark-Yellow    Appearance, Urine Clear Clear    Specific Gravity, Urine 1.006 1.005 - 1.035    pH, Urine 7.0 5.0, 5.5, 6.0, 6.5, 7.0, 7.5, 8.0    Protein, Urine NEGATIVE NEGATIVE, 10 (TRACE), 20 (TRACE) mg/dL    Glucose, Urine Normal Normal mg/dL    Blood, Urine 0.03 (TRACE) (A) NEGATIVE    Ketones, Urine NEGATIVE NEGATIVE mg/dL    Bilirubin, Urine NEGATIVE NEGATIVE    Urobilinogen, Urine Normal Normal mg/dL    Nitrite, Urine NEGATIVE NEGATIVE    Leukocyte Esterase, Urine NEGATIVE NEGATIVE   Urinalysis Microscopic   Result Value Ref Range    WBC, Urine 1-5 1-5, NONE /HPF    RBC, Urine 3-5 NONE, 1-2, 3-5 /HPF    Squamous Epithelial Cells, Urine 1-9 (SPARSE) Reference range not established. /HPF     *Note: Due to a large number of results and/or encounters for the requested time period, some results have not been displayed. A complete set of results can be found in Results Review.        CT abdomen pelvis w IV contrast  Result Date: 12/30/2024  Interpreted By:  Pravin Haddad, STUDY: CT ABDOMEN PELVIS W IV CONTRAST;  12/30/2024 4:43 pm   INDICATION: Signs/Symptoms:abd pain near stoma and near proctectomy.   COMPARISON: None.     1.  Postsurgical change of total colectomy. Right lower quadrant end ileostomy with unchanged small parastomal fat containing hernia without evidence of inflammation, bowel obstruction, or bowel wall thickening. 2. Similar appearance of presacral fluid collection measuring 3.4 x 3.2 x 6.3 cm, which may represent postsurgical seroma or hematoma,  sterility of this collection can not be determined on imaging similar to prior.     Signed by: Pravin Haddad 12/30/2024 6:11 PM Dictation workstation:   [id number]      Assessment/Plan   Acute on chronic abdominal/pelvic pain  History of total colectomy, end ileostomy, and robotic proctectomy  Hyperlipidemia  Anxiety  Depression  DVT prophylaxis    PLAN  Admit to the observation unit.  Pain control has been ordered with Tylenol, oxycodone, and Dilaudid IV as needed.  Continue with gabapentin.  Patient will need to follow-up with OB/GYN regarding pudendal nerve block.  Continue other home medications.  Patient replaced on Lovenox for DVT prophylaxis.  Garfield Jackson DO

## 2024-12-31 NOTE — CARE PLAN
The clinical goals for the shift include pain <4/10    Problem: Pain - Adult  Goal: Verbalizes/displays adequate comfort level or baseline comfort level  Outcome: Progressing     Problem: Discharge Planning  Goal: Discharge to home or other facility with appropriate resources  Outcome: Progressing     Problem: Chronic Conditions and Co-morbidities  Goal: Patient's chronic conditions and co-morbidity symptoms are monitored and maintained or improved  Outcome: Progressing     Problem: Fall/Injury  Goal: Not fall by end of shift  Outcome: Progressing  Goal: Be free from injury by end of the shift  Outcome: Progressing  Goal: Use assistive devices by end of the shift  Outcome: Progressing  Goal: Pace activities to prevent fatigue by end of the shift  Outcome: Progressing     Problem: Pain  Goal: Takes deep breaths with improved pain control throughout the shift  Outcome: Progressing  Goal: Turns in bed with improved pain control throughout the shift  Outcome: Progressing  Goal: Walks with improved pain control throughout the shift  Outcome: Progressing  Goal: Performs ADL's with improved pain control throughout shift  Outcome: Progressing  Goal: Free from opioid side effects throughout the shift  Outcome: Progressing  Goal: Free from acute confusion related to pain meds throughout the shift  Outcome: Progressing

## 2024-12-31 NOTE — DISCHARGE INSTRUCTIONS
[facility] Observation Unit Discharge Instructions  You came to the hospital with abdominal pain and were admitted for observation and further care.   You were treated with pain medication regiment.    Your discharge plan is to go home to recover.    Please see your primary care doctor in 1 week for follow-up.   An appointment has been requested for you but may you need to call your doctors office to schedule.    Additionally, a referral has been ordered for you to follow up with pain medicine.   The office or scheduling department should call you to arrange an appointment in the next week or two.     For any issues or concerns with appointments, call the  scheduling line at [phone] or the providers office directly.    START WITH GABAPENTIN 300 MG 3 TIMES A DAY. AFTER A WEEK, IF NO SIDE EFFECTS AND PAIN STILL NOT CONTROLLED, YOU CAN START INCREASING  MG 3 TIMES A DAY (GO UP  MG AT NIGHT FOR A FEW DAYS, THEN UP  MG IN AFTERNOON FOR A FEW DAYS, THEN UP  MG IN MORNING).        See the attached information for education about any new medications and the condition(s) you were treated for.  Your medications may have changed so pay close attention to the list given to you at discharge and ask any questions you have before leaving the hospital.

## 2025-01-01 VITALS
DIASTOLIC BLOOD PRESSURE: 69 MMHG | WEIGHT: 175 LBS | BODY MASS INDEX: 33.04 KG/M2 | HEART RATE: 76 BPM | HEIGHT: 61 IN | RESPIRATION RATE: 20 BRPM | SYSTOLIC BLOOD PRESSURE: 116 MMHG | OXYGEN SATURATION: 93 % | TEMPERATURE: 97.3 F

## 2025-01-01 DIAGNOSIS — E78.01 FAMILIAL HYPERCHOLESTEROLEMIA: ICD-10-CM

## 2025-01-01 PROCEDURE — 2500000001 HC RX 250 WO HCPCS SELF ADMINISTERED DRUGS (ALT 637 FOR MEDICARE OP): Performed by: INTERNAL MEDICINE

## 2025-01-01 PROCEDURE — 2500000001 HC RX 250 WO HCPCS SELF ADMINISTERED DRUGS (ALT 637 FOR MEDICARE OP): Performed by: HOSPITALIST

## 2025-01-01 PROCEDURE — G0378 HOSPITAL OBSERVATION PER HR: HCPCS

## 2025-01-01 PROCEDURE — 99238 HOSP IP/OBS DSCHRG MGMT 30/<: CPT

## 2025-01-01 RX ORDER — GABAPENTIN 300 MG/1
300 CAPSULE ORAL 3 TIMES DAILY
Status: DISCONTINUED | OUTPATIENT
Start: 2025-01-01 | End: 2025-01-01 | Stop reason: HOSPADM

## 2025-01-01 RX ADMIN — OXYCODONE HYDROCHLORIDE 10 MG: 5 TABLET ORAL at 14:35

## 2025-01-01 RX ADMIN — GABAPENTIN 300 MG: 300 CAPSULE ORAL at 08:08

## 2025-01-01 RX ADMIN — OXYCODONE HYDROCHLORIDE 10 MG: 5 TABLET ORAL at 06:28

## 2025-01-01 RX ADMIN — GABAPENTIN 300 MG: 300 CAPSULE ORAL at 14:35

## 2025-01-01 RX ADMIN — PANTOPRAZOLE SODIUM 40 MG: 40 TABLET, DELAYED RELEASE ORAL at 06:28

## 2025-01-01 ASSESSMENT — PAIN SCALES - GENERAL
PAINLEVEL_OUTOF10: 8
PAINLEVEL_OUTOF10: 4
PAINLEVEL_OUTOF10: 7
PAINLEVEL_OUTOF10: 6
PAINLEVEL_OUTOF10: 0 - NO PAIN

## 2025-01-01 ASSESSMENT — COGNITIVE AND FUNCTIONAL STATUS - GENERAL
MOBILITY SCORE: 24
DAILY ACTIVITIY SCORE: 24

## 2025-01-01 ASSESSMENT — PAIN - FUNCTIONAL ASSESSMENT
PAIN_FUNCTIONAL_ASSESSMENT: 0-10
PAIN_FUNCTIONAL_ASSESSMENT: 0-10

## 2025-01-01 ASSESSMENT — PAIN SCALES - PAIN ASSESSMENT IN ADVANCED DEMENTIA (PAINAD): TOTALSCORE: MEDICATION (SEE MAR)

## 2025-01-01 ASSESSMENT — PAIN DESCRIPTION - LOCATION: LOCATION: ABDOMEN

## 2025-01-01 ASSESSMENT — PAIN DESCRIPTION - ORIENTATION: ORIENTATION: RIGHT

## 2025-01-01 NOTE — PROGRESS NOTES
Medicine PA follow up note    Subjective:  Patient sitting up in bed in mild discomfort. Patient reports pain being slightly improved.    Vitals (Last 24 Hours):  Heart Rate:  [68-87]   Temp:  [35.7 °C (96.3 °F)-36.5 °C (97.7 °F)]   Resp:  [17-18]   BP: (117-131)/(61-84)   SpO2:  [90 %-100 %]       I have reviewed all imaging reports and labs pertinent to this visit / presenting problem    PHYSICAL EXAM:  Constitutional: NAD, alert and cooperative  Eyes: no icterus  ENMT: mucous membranes moist, no lesions  Head/Neck: supple  Respiratory/Thorax: CTA bilaterally, non-labored breathing, no cough, on RA  Cardiovascular: RRR, no murmurs heard  Gastrointestinal: TTP on right side of abdomen, ileostomy looks clean  : no Birch, no SP/flank discomfort  Musculoskeletal: no joint swelling, ROM intact  Extremities: no edema  Neurological: non-focal  Skin: warm and dry, psoriasis skin lesion on lower arms bilaterally  Psych: calm, stable mood     MEDS:  Scheduled meds  enoxaparin, 40 mg, subcutaneous, Daily  escitalopram, 20 mg, oral, Nightly  ondansetron, 4 mg, intravenous, Once  pantoprazole, 40 mg, oral, BID AC  rosuvastatin, 20 mg, oral, Nightly        Continuous meds       PRN meds  PRN medications: acetaminophen, alum-mag hydroxide-simeth, ketorolac, ondansetron, oxyCODONE      ASSESSMENT/PLAN:  Maureen See is a 54 y.o. female with history of ulcerative colitis status post laparoscopic total colectomy with end ileostomy, and proctectomy presents to the [facility] emergency department with worsening chronic abdominal pain.  Pain is located near her stoma and deep pelvic area.  Pain is constant and sharp. Patient states she has had pain since her last surgery in June 2024.      On arrival to [facility] ER, temperature 36.4, pulse 82, respiratory 18, blood pressure 164/98, saturation 100% room air.  Lab work was unremarkable.  Urinalysis negative.  Patient underwent CT abdomen pelvis with IV contrast which which showed  postsurgical changes without evidence of inflammation obstruction or bowel wall thickening, and unchanged presacral fluid collection.     In the ER she was given Zofran 4 mg, lactated Ringer's 1 L, and Dilaudid 1 mg and 0.5 mg IV.  Patient was requested be admitted for further management of her acute on chronic abdominal pain.     Patient had a follow-up with colorectal surgery on 10/29 regarding her pain.  She was then referred to pain management, which she recently saw on 12/4.  She was given prescription for gabapentin and was recommended for possible pudendal nerve block. Patient states that the gabapentin is not helping her pain. Patient states that her pain is affecting her activity of daily living.     Abdominal/perineum pain  Hx of UC s/p total colectomy, proctectomy   -CT a/p: postsurgical change of total colectomy. Right lower quadrant end   ileostomy with unchanged small parastomal fat containing hernia without evidence of inflammation, bowel obstruction, or bowel wall thickening. Similar appearance of presacral fluid collection measuring 3.4 x   3.2 x 6.3 cm   -fluid collection addressed on 11/26 by IR op, approach to aspirate/ drain collection were riskier than potential benefit, appears similar on CT today  -tylenol 650 mg q 6 hrs prn  -IV toradol 30 mg prn   -oxycodone 10 mg q 8 hr prn    HLD  -Continue rosuvastatin 20 mg daily     Hematuria  -pt endorsed hematuria when going to bathroom  -Hemoglobin: 12.6  -obtain UA  -UA on admission was clean   -monitor     Other comorbidities as above  -continue medications as ordered and adjust based on clinical course     VTE / GI prophylaxis   -subcutaneous Lovenox, PPI, bowel regimen in place     Discharge planning  -home when pain better managed     Discussed with Dr. Haddad and the interdisciplinary team     Cata Daigle PA-C

## 2025-01-01 NOTE — NURSING NOTE
Patient has been discharged. Discharge instructions provided using teach back method.  Pt aware to follow up with outpatient clinics as scheduled. Home going meds reviewed with pt. Pt verbalized understanding of disposition and discharge instructions. All questions answered to patient's satisfaction and within nursing scope of practice. Vitals stable, IVs removed.

## 2025-01-01 NOTE — CARE PLAN
The patient's goals for the shift include  restful environment    The clinical goals for the shift include patient will be able to maintain hemodynamic stability by the end of the shift    Problem: Pain - Adult  Goal: Verbalizes/displays adequate comfort level or baseline comfort level  Outcome: Progressing     Problem: Fall/Injury  Goal: Not fall by end of shift  Outcome: Progressing  Goal: Be free from injury by end of the shift  Outcome: Progressing  Goal: Use assistive devices by end of the shift  Outcome: Progressing  Goal: Pace activities to prevent fatigue by end of the shift  Outcome: Progressing     Problem: Pain  Goal: Takes deep breaths with improved pain control throughout the shift  Outcome: Progressing  Goal: Turns in bed with improved pain control throughout the shift  Outcome: Progressing  Goal: Walks with improved pain control throughout the shift  Outcome: Progressing  Goal: Performs ADL's with improved pain control throughout shift  Outcome: Progressing  Goal: Free from opioid side effects throughout the shift  Outcome: Progressing  Goal: Free from acute confusion related to pain meds throughout the shift  Outcome: Progressing

## 2025-01-01 NOTE — CARE PLAN
The patient's goals for the shift include  less pain    The clinical goals for the shift include pain management      Problem: Pain - Adult  Goal: Verbalizes/displays adequate comfort level or baseline comfort level  Outcome: Not Progressing     Problem: Discharge Planning  Goal: Discharge to home or other facility with appropriate resources  Outcome: Progressing

## 2025-01-02 ENCOUNTER — APPOINTMENT (OUTPATIENT)
Dept: GASTROENTEROLOGY | Facility: CLINIC | Age: 55
End: 2025-01-02
Payer: COMMERCIAL

## 2025-01-02 LAB — BACTERIA UR CULT: NO GROWTH

## 2025-01-02 NOTE — DISCHARGE SUMMARY
Discharge Diagnosis  Intractable abdominal pain    Issues Requiring Follow-Up  PCP, Pain medicine    Discharge Meds     Medication List      START taking these medications     oxyCODONE 10 mg immediate release tablet; Commonly known as: Roxicodone;   Take 1 tablet (10 mg) by mouth 2 times a day as needed for severe pain (7   - 10) for up to 5 days.     CHANGE how you take these medications     gabapentin 600 mg tablet; Commonly known as: Neurontin; Take 0.5 tablets   (300 mg) by mouth 3 times a day.; What changed: how much to take, when to   take this     CONTINUE taking these medications     esomeprazole 40 mg DR capsule; Commonly known as: NexIUM; Take 1 capsule   (40 mg) by mouth 2 times a day. Do not open capsule.   Lexapro 20 mg tablet; Generic drug: escitalopram   norethindrone 0.35 mg tablet; Commonly known as: Micronor; Take 1 tablet   (0.35 mg) over 28 days by mouth once daily for 120 doses.   rosuvastatin 20 mg tablet; Commonly known as: Crestor; TAKE 1 TABLET BY   MOUTH EVERY DAY       Test Results Pending At Discharge  Pending Labs       Order Current Status    Extra Urine Gray Tube Collected (12/31/24 2014)    Urinalysis with Reflex Culture and Microscopic In process    Urine Culture In process            Hospital Course  Maureen See is a 54 y.o. female with history of ulcerative colitis status post laparoscopic total colectomy with end ileostomy, and proctectomy presents to the [facility] emergency department with worsening chronic abdominal pain.  Pain is located near her stoma and deep pelvic area.  Pain is constant and sharp. Patient states she has had pain since her last surgery in June 2024.      On arrival to [facility] ER, temperature 36.4, pulse 82, respiratory 18, blood pressure 164/98, saturation 100% room air.  Lab work was unremarkable.  Urinalysis negative.  Patient underwent CT abdomen pelvis with IV contrast which which showed postsurgical changes without evidence of inflammation  obstruction or bowel wall thickening, and unchanged presacral fluid collection.     In the ER she was given Zofran 4 mg, lactated Ringer's 1 L, and Dilaudid 1 mg and 0.5 mg IV.  Patient was requested be admitted for further management of her acute on chronic abdominal pain.     Patient had a follow-up with colorectal surgery on 10/29 regarding her pain.  She was then referred to pain management, which she recently saw on 12/4.  She was given prescription for gabapentin and was recommended for possible pudendal nerve block. Patient states that the gabapentin is not helping her pain. Patient states that her pain is affecting her activity of daily living.      Abdominal/perineum pain  Hx of UC s/p total colectomy, proctectomy   -CT a/p: postsurgical change of total colectomy. Right lower quadrant end   ileostomy with unchanged small parastomal fat containing hernia without evidence of inflammation, bowel obstruction, or bowel wall thickening. Similar appearance of presacral fluid collection measuring 3.4 x   3.2 x 6.3 cm   -fluid collection addressed on 11/26 by IR op, approach to aspirate/ drain collection were riskier than potential benefit, appears similar on CT today  -oxycodone 10 mg BID x 5 days and gabapentin 600 mg TID prescribed to patient upon discharge     Instructed patient to follow up with pain medicine for further management as this pain is chronic and will keep her from fulfilling ADLs, working if not managed appropriately.     Pertinent Physical Exam At Time of Discharge  Physical Exam  Constitutional:       Appearance: Normal appearance.   Cardiovascular:      Rate and Rhythm: Normal rate and regular rhythm.   Pulmonary:      Effort: Pulmonary effort is normal.      Breath sounds: Normal breath sounds.   Abdominal:      General: Bowel sounds are normal.      Tenderness: There is abdominal tenderness in the right upper quadrant and right lower quadrant.      Comments: Stoma appreciated, TTP on abdomen  right of stoma   Neurological:      Mental Status: She is alert.         Outpatient Follow-Up  Future Appointments   Date Time Provider Department Center   1/22/2025 10:40 AM Janet Trivedi MD [id number] [place]   1/28/2025 12:15 PM Irene Silver MD [id number] [place]   1/30/2025 11:15 AM Jessy Mo, APRN-CNP [id number] [place]   2/5/2025  4:30 PM Brianna Hernandez MD [id number] [place]   4/10/2025  3:00 PM Magi Pollack DO [id number] [place]     All labs and vitals reviewed. Patient stable for discharge. Both plan and discharge discussed with Dr. Haddad and the interdisciplinary team.    Cata Daigle PA-C

## 2025-01-03 ENCOUNTER — OFFICE VISIT (OUTPATIENT)
Dept: GASTROENTEROLOGY | Facility: CLINIC | Age: 55
End: 2025-01-03
Payer: COMMERCIAL

## 2025-01-03 VITALS
HEART RATE: 102 BPM | BODY MASS INDEX: 33.42 KG/M2 | HEIGHT: 61 IN | SYSTOLIC BLOOD PRESSURE: 143 MMHG | TEMPERATURE: 98.1 F | DIASTOLIC BLOOD PRESSURE: 97 MMHG | WEIGHT: 177 LBS

## 2025-01-03 DIAGNOSIS — L98.8 FISTULA: ICD-10-CM

## 2025-01-03 DIAGNOSIS — R87.9 ABNORMAL VAGINAL FLUIDS: ICD-10-CM

## 2025-01-03 DIAGNOSIS — R10.30 LOWER ABDOMINAL PAIN: Primary | ICD-10-CM

## 2025-01-03 DIAGNOSIS — R11.0 NAUSEA: ICD-10-CM

## 2025-01-03 LAB
ATRIAL RATE: 62 BPM
P AXIS: 73 DEGREES
P OFFSET: 196 MS
P ONSET: 151 MS
PR INTERVAL: 136 MS
Q ONSET: 219 MS
QRS COUNT: 10 BEATS
QRS DURATION: 80 MS
QT INTERVAL: 444 MS
QTC CALCULATION(BAZETT): 450 MS
QTC FREDERICIA: 449 MS
R AXIS: 33 DEGREES
T AXIS: 41 DEGREES
T OFFSET: 441 MS
VENTRICULAR RATE: 62 BPM

## 2025-01-03 PROCEDURE — 3008F BODY MASS INDEX DOCD: CPT | Performed by: NURSE PRACTITIONER

## 2025-01-03 PROCEDURE — 99214 OFFICE O/P EST MOD 30 MIN: CPT | Performed by: NURSE PRACTITIONER

## 2025-01-03 RX ORDER — ONDANSETRON HYDROCHLORIDE 8 MG/1
8 TABLET, FILM COATED ORAL 3 TIMES DAILY
Qty: 90 TABLET | Refills: 1 | Status: SHIPPED | OUTPATIENT
Start: 2025-01-03 | End: 2025-02-02

## 2025-01-03 RX ORDER — ROSUVASTATIN CALCIUM 20 MG/1
20 TABLET, COATED ORAL DAILY
Qty: 90 TABLET | Refills: 0 | Status: SHIPPED | OUTPATIENT
Start: 2025-01-03

## 2025-01-03 NOTE — PATIENT INSTRUCTIONS
History of ulcerative colitis status post total colectomy and proctectomy with end ileostomy-persistent pain and pelvis and stump of ostomy without relief from gabapentin.  You may benefit from pudendal pain block, please follow up with pain management. You can use the dicyclomine as needed for abd pain as well.     Clear fluid- urine like in consistency coming out of your vagina. I will refer you to uro-GYN for evaluation of possible urinary bladder/ vaginal fistula, I will order a ct enterography for evaluation of a fistula.    Nausea- advance you diet as tolerated, I will order zofran to help with the nausea as well.    I will see you back for follow up after the pain management and URO/GYN consults.    Please send me the forms for short term disability to fill out.

## 2025-01-03 NOTE — PROGRESS NOTES
Subjective   Patient ID: Maureen See is a 54 y.o. female.    HPI  54-year-old female for follow-up visit after hospitalization for intractable abdominal pain on 1/1/2025  History of ulcerative colitis status post lap total cholecystectomy and end ileostomy, prostatectomy who presented to [facility] with worsening abdominal pain near her stoma and deep pelvic pain.  She did undergo a CT of the abdomen and pelvis with IV contrast which showed status post surgical changes without evidence of inflammation, obstruction or bowel wall thickening and unchanged andrew-sacral fluid collection.  She was given Zofran and lactated Ringer's and Dilaudid in the emergency room for management of her pain and subsequently admitted.  She was given a prescription for gabapentin was recommended possible pudendal nerve block.  She was not having relief from the gabapentin    Pain in her rectum and pelvic area that just makes her double over in pain   Feels like it is stabbing all the time  On gabapentin and doesn't help  Nerve pain  Has a new referral to pain management in [place] to see about a block  Having a lot of nausea with the pain  Liquids in her bag since the hospital  No appetite  Drinking liquids - gatorade solution  Has been calling off work  Has bentyl at home but not taking  Doesn't have nausea meds  Having clear water coming out of her vagina like she is urinating    Objective   Physical Exam  Cardiovascular:      Rate and Rhythm: Normal rate and regular rhythm.      Pulses: Normal pulses.      Heart sounds: Normal heart sounds.   Pulmonary:      Effort: Pulmonary effort is normal.      Breath sounds: Normal breath sounds.   Abdominal:      General: Bowel sounds are normal.      Palpations: Abdomen is soft.      Tenderness: There is abdominal tenderness.      Comments: bloating         Assessment/Plan     History of ulcerative colitis status post total colectomy and proctectomy with end ileostomy-persistent pain  and pelvis and stump of ostomy without relief from gabapentin.  You may benefit from pudendal pain block, please follow up with pain management. You can use the dicyclomine as needed for abd pain as well.     Clear fluid- urine like in consistency coming out of your vagina. I will refer you to uro-GYN for evaluation of possible urinary bladder/ vaginal fistula, I will order a ct enterography for evaluation of a fistula.    Nausea- advance you diet as tolerated, I will order zofran to help with the nausea as well.    I will see you back for follow up after the pain management and URO/GYN consults.    Please send me the forms for short term disability to fill out.

## 2025-01-15 DIAGNOSIS — R10.30 LOWER ABDOMINAL PAIN: Primary | ICD-10-CM

## 2025-01-15 RX ORDER — DICYCLOMINE HYDROCHLORIDE 10 MG/1
10 CAPSULE ORAL
Qty: 360 CAPSULE | Refills: 3 | Status: SHIPPED | OUTPATIENT
Start: 2025-01-15 | End: 2026-01-15

## 2025-01-16 ENCOUNTER — APPOINTMENT (OUTPATIENT)
Dept: RADIOLOGY | Facility: HOSPITAL | Age: 55
End: 2025-01-16
Payer: COMMERCIAL

## 2025-01-17 ENCOUNTER — APPOINTMENT (OUTPATIENT)
Dept: RADIOLOGY | Facility: CLINIC | Age: 55
End: 2025-01-17
Payer: COMMERCIAL

## 2025-01-21 ENCOUNTER — APPOINTMENT (OUTPATIENT)
Dept: RADIOLOGY | Facility: HOSPITAL | Age: 55
End: 2025-01-21
Payer: COMMERCIAL

## 2025-01-22 ENCOUNTER — APPOINTMENT (OUTPATIENT)
Dept: RHEUMATOLOGY | Facility: CLINIC | Age: 55
End: 2025-01-22
Payer: COMMERCIAL

## 2025-01-28 ENCOUNTER — APPOINTMENT (OUTPATIENT)
Dept: OBSTETRICS AND GYNECOLOGY | Facility: CLINIC | Age: 55
End: 2025-01-28
Payer: COMMERCIAL

## 2025-01-28 ENCOUNTER — OFFICE VISIT (OUTPATIENT)
Dept: PAIN MEDICINE | Facility: HOSPITAL | Age: 55
End: 2025-01-28
Payer: COMMERCIAL

## 2025-01-28 DIAGNOSIS — K62.89 RECTAL PAIN: ICD-10-CM

## 2025-01-28 DIAGNOSIS — R10.9 INTRACTABLE ABDOMINAL PAIN: ICD-10-CM

## 2025-01-28 DIAGNOSIS — K13.79 MOUTH SORES: Primary | ICD-10-CM

## 2025-01-28 PROCEDURE — 99204 OFFICE O/P NEW MOD 45 MIN: CPT | Performed by: ANESTHESIOLOGY

## 2025-01-28 PROCEDURE — 99214 OFFICE O/P EST MOD 30 MIN: CPT | Performed by: ANESTHESIOLOGY

## 2025-01-28 ASSESSMENT — PAIN SCALES - GENERAL: PAINLEVEL_OUTOF10: 7

## 2025-01-28 NOTE — PROGRESS NOTES
Chief Complaint   Patient presents with    Abdominal Pain    Rectal Pain        HPI   Ms. See is a 54-year-old female with a history of rectal pain.  The patient is presenting for initial evaluation.  She did on 1 occasion see Dr. Franky Arizmendi who offered gabapentin and discussed the potential of a nerve block.  The patient said that the encounter was somewhat odd and she did not want to follow-up with that provider.  That being said she is interested in any kind of treatment that would help her pain further.  She has followed closely with her surgeon Dr. De Leon and also sees Dr. Mo.  In terms of further workup she does have an upcoming CT enterography to look for a potential fistula.  She did have a prior surgery for ulcerative colitis which was a robot-assisted proctectomy with ileostomy on 6/25/2024.  The patient says that the pain that she felt externally that was surgical related did relieve but she has internal pain in her buttock area/perineum that has been persistent.  She cannot sit down and throughout the entire encounter she is turned to the side to take pressure off of the rectal area/buttocks.  She has a large pillow that she sits on that is soft.  She says that she has been sleeping in a recliner for the past 4 years due to pain reasons.  She describes the pain as being a 9 out of 10.  Pain can be sharp and stabbing.  Pain is deep inside.  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  She has been on gabapentin which she takes 300 mg 3 times daily for the past month and a half.  She has been on intermittent oxycodone the last prescription of which was given on 12/31/2024 based on [facility].    ROS: 13 point review of systems is complete and is negative listed above in HPI    Past Medical History:   Diagnosis Date    Anemia     Anxiety     Asthma     Depression     Familial hypercholesterolemia     Fatty liver     GERD (gastroesophageal reflux disease)     Ileostomy in place (Multi)  2023    Inflammatory bowel disease     Iron deficiency anemia     Obesity     Prediabetes     2023 A1C 5.5%    Ulcerative colitis     Vitamin D deficiency        Past Surgical History:   Procedure Laterality Date     SECTION, LOW TRANSVERSE  2000    10/14/1992 & 2000    FLEXIBLE SIGMOIDOSCOPY  2024    TOTAL COLECTOMY  2023 OPERATION/PROCEDURE: Single-incision laparoscopic total abdominal colectomy with end ileostomy    WISDOM TOOTH EXTRACTION         Family History   Problem Relation Name Age of Onset    Arthritis Mother Pam Cruz     Hypertension Mother Pam Cruz     Heart disease Father Jordan Cruz     Hypertension Father Jordan Cruz     Colon polyps Father Jordan Cruz     Cancer Maternal Grandmother Morena cid     Diabetes Maternal Grandmother Morenacamila cid     Colon cancer Maternal Grandmother Morenacamila cid     Cancer Paternal Grandfather Tyrell cruz     Cancer Father's Brother Gianfranco Cruz     Diabetes Son Adam Noar     Diabetes Brother Raleigh Cruz     Hypertension Brother Raleigh Cruz        Social History     Tobacco Use    Smoking status: Former     Current packs/day: 0.00     Average packs/day: 0.5 packs/day for 31.4 years (15.7 ttl pk-yrs)     Types: Cigarettes     Start date: 1984     Quit date: 2015     Years since quittin.4    Smokeless tobacco: Never   Vaping Use    Vaping status: Never Used   Substance Use Topics    Alcohol use: Not Currently     Alcohol/week: 1.0 standard drink of alcohol     Types: 1 Glasses of wine per week    Drug use: Never       Current Outpatient Medications on File Prior to Visit   Medication Sig Dispense Refill    dicyclomine (Bentyl) 10 mg capsule Take 1 capsule (10 mg) by mouth 4 times a day before meals. Take with meals and at bedtime 360 capsule 3    escitalopram (Lexapro) 20 mg tablet Take 1 tablet (20 mg) by mouth once daily at bedtime.      esomeprazole (NexIUM) 40 mg DR capsule Take 1 capsule  (40 mg) by mouth 2 times a day. Do not open capsule. 180 capsule 3    gabapentin (Neurontin) 600 mg tablet Take 0.5 tablets (300 mg) by mouth 3 times a day. 45 tablet 2    norethindrone (Micronor) 0.35 mg tablet Take 1 tablet (0.35 mg) over 28 days by mouth once daily for 120 doses. 28 tablet 5    ondansetron (Zofran) 8 mg tablet Take 1 tablet (8 mg) by mouth 3 times a day. 90 tablet 1    rosuvastatin (Crestor) 20 mg tablet TAKE 1 TABLET BY MOUTH EVERY DAY 90 tablet 0     No current facility-administered medications on file prior to visit.        Allergies   Allergen Reactions    Amoxicillin Hives, Rash and Unknown    Aspirin GI Upset, Nausea/vomiting and Unknown    Azithromycin Nausea/vomiting and Rash    Clindamycin Rash and Unknown    Sulfamethoxazole Hives          Imaging:  Narrative & Impression   Interpreted By:  Pravin Haddad,   STUDY:  CT ABDOMEN PELVIS W IV CONTRAST;  12/30/2024 4:43 pm      INDICATION:  Signs/Symptoms:abd pain near stoma and near proctectomy.      COMPARISON:  None.      ACCESSION NUMBER(S):  [id number]      ORDERING CLINICIAN:  SUSAN HAGEN      TECHNIQUE:  CT of the abdomen and pelvis was performed.  Standard contiguous  axial images were obtained through the abdomen and pelvis. Coronal  and sagittal reconstructions were performed.      75 ml of contrast Omnipaque 350 were administered intravenously  without immediate complication.      FINDINGS:  LOWER CHEST: No acute abnormality of the lung bases.  BONES: No acute osseous abnormality.  ABDOMINAL WALL: Within normal limits.  LYMPH NODES: No pathologically enlarged lymph nodes in the abdomen or  pelvis.      ABDOMEN:      LIVER: Normal size and contour. No focal hepatic lesions.  BILE DUCTS: Normal caliber.  GALLBLADDER: No calcified gallstones. No wall thickening.  PANCREAS: No evidence of pancreatic duct dilatation or peripancreatic  edema. SPLEEN: Within normal limits.  ADRENALS: Within normal limits.  KIDNEYS and URETERS: No  evidence of hydronephrosis or  nephrolithiasis. Normal size and enhancement pattern.          VESSELS: No aortic aneurysm. Mesenteric vessels are patent.  RETROPERITONEUM: No evidence of retroperitoneal hematoma.      PELVIS:      REPRODUCTIVE ORGANS: No pelvic masses.  BLADDER: No gross abnormality.      BOWEL: There is postsurgical change of total colectomy. Right lower  quadrant end ileostomy without evidence of bowel dilatation or  obstruction. No evidence of bowel wall thickening or adjacent  inflammatory change. Small fat containing right parastomal hernia  similar to prior without inflammatory change. PERITONEUM: No ascites  or free air. Similar appearance of presacral fluid collection  measuring 3.4 x 3.2 x 6.3 cm which does not appear significantly  changed in size taking into account differences in technique no  adjacent inflammatory change.      IMPRESSION:  1.  Postsurgical change of total colectomy. Right lower quadrant end  ileostomy with unchanged small parastomal fat containing hernia  without evidence of inflammation, bowel obstruction, or bowel wall  thickening.  2. Similar appearance of presacral fluid collection measuring 3.4 x  3.2 x 6.3 cm, which may represent postsurgical seroma or hematoma,  sterility of this collection can not be determined on imaging similar  to prior.     Physical Exam:  Gen.: Patient is visibly uncomfortable  Eyes: Pupils symmetric  ENT: Hearing is intact  Neck: No JVD noted  Respiratory: No gasping or shortness of breath   Cardiovascular: Extremity show no edema  Skin: No rashes or open lesions or ulcers identified on the skin  Musculoskeletal: Unable to sit flat, leaned over throughout the entire encounter  Neurologic: Cranial nerves II through XII are grossly intact  Psychiatric:  Patient is alert and oriented x3    Impression/Plan:  54-year-old female with a history of ulcerative colitis who underwent a proctectomy.  Patient has ongoing perineal/rectal area pain.  She  says that the pain is severe and intractable.  I think to best to treat this area likely would pursue a ganglion impar block.  I did review her imaging which does show some fluid in the presacral area just anterior to where we would need to place the needle.  Will reach out to her surgical team and discussed this case to see if there is any reason why we could not proceed.  Procedure, risk, benefits, alternatives reviewed with the patient.  Will tentatively schedule and discuss plan of care with her surgical team.

## 2025-01-30 ENCOUNTER — APPOINTMENT (OUTPATIENT)
Dept: GASTROENTEROLOGY | Facility: CLINIC | Age: 55
End: 2025-01-30
Payer: COMMERCIAL

## 2025-01-30 ENCOUNTER — APPOINTMENT (OUTPATIENT)
Dept: RADIOLOGY | Facility: HOSPITAL | Age: 55
End: 2025-01-30
Payer: COMMERCIAL

## 2025-01-30 DIAGNOSIS — K62.89 RECTAL PAIN: ICD-10-CM

## 2025-01-30 DIAGNOSIS — R10.9 INTRACTABLE ABDOMINAL PAIN: ICD-10-CM

## 2025-01-31 ASSESSMENT — PROMIS GLOBAL HEALTH SCALE
RATE_AVERAGE_FATIGUE: SEVERE
RATE_QUALITY_OF_LIFE: FAIR
EMOTIONAL_PROBLEMS: SOMETIMES
CARRYOUT_PHYSICAL_ACTIVITIES: MODERATELY
RATE_MENTAL_HEALTH: VERY GOOD
RATE_PHYSICAL_HEALTH: FAIR
RATE_SOCIAL_SATISFACTION: VERY GOOD
RATE_GENERAL_HEALTH: FAIR
CARRYOUT_SOCIAL_ACTIVITIES: FAIR
RATE_AVERAGE_PAIN: 8

## 2025-02-05 ENCOUNTER — APPOINTMENT (OUTPATIENT)
Dept: PRIMARY CARE | Facility: CLINIC | Age: 55
End: 2025-02-05
Payer: COMMERCIAL

## 2025-02-07 ENCOUNTER — APPOINTMENT (OUTPATIENT)
Dept: GASTROENTEROLOGY | Facility: CLINIC | Age: 55
End: 2025-02-07
Payer: COMMERCIAL

## 2025-02-08 LAB
ERYTHROCYTE [DISTWIDTH] IN BLOOD BY AUTOMATED COUNT: 15.1 % (ref 11–15)
FOLATE SERPL-MCNC: 15.5 NG/ML
HCT VFR BLD AUTO: 39.6 % (ref 35–45)
HGB BLD-MCNC: 12.3 G/DL (ref 11.7–15.5)
HSV1 IGG SER IA-ACNC: NORMAL
HSV2 IGG SER IA-ACNC: NORMAL
IRON SATN MFR SERPL: 10 % (CALC) (ref 16–45)
IRON SERPL-MCNC: 40 MCG/DL (ref 45–160)
MAGNESIUM SERPL-MCNC: 2.1 MG/DL (ref 1.5–2.5)
MCH RBC QN AUTO: 27.6 PG (ref 27–33)
MCHC RBC AUTO-ENTMCNC: 31.1 G/DL (ref 32–36)
MCV RBC AUTO: 88.8 FL (ref 80–100)
PLATELET # BLD AUTO: 261 THOUSAND/UL (ref 140–400)
PMV BLD REES-ECKER: 11.6 FL (ref 7.5–12.5)
RBC # BLD AUTO: 4.46 MILLION/UL (ref 3.8–5.1)
TIBC SERPL-MCNC: 407 MCG/DL (CALC) (ref 250–450)
VIT B12 SERPL-MCNC: 776 PG/ML (ref 200–1100)
WBC # BLD AUTO: 7.6 THOUSAND/UL (ref 3.8–10.8)
ZINC SERPL-MCNC: NORMAL UG/ML

## 2025-02-10 LAB
ERYTHROCYTE [DISTWIDTH] IN BLOOD BY AUTOMATED COUNT: 15.1 % (ref 11–15)
FOLATE SERPL-MCNC: 15.5 NG/ML
HCT VFR BLD AUTO: 39.6 % (ref 35–45)
HGB BLD-MCNC: 12.3 G/DL (ref 11.7–15.5)
HSV1 IGG SER IA-ACNC: <0.9 INDEX
HSV2 IGG SER IA-ACNC: 1.09 INDEX
IRON SATN MFR SERPL: 10 % (CALC) (ref 16–45)
IRON SERPL-MCNC: 40 MCG/DL (ref 45–160)
MAGNESIUM SERPL-MCNC: 2.1 MG/DL (ref 1.5–2.5)
MCH RBC QN AUTO: 27.6 PG (ref 27–33)
MCHC RBC AUTO-ENTMCNC: 31.1 G/DL (ref 32–36)
MCV RBC AUTO: 88.8 FL (ref 80–100)
PLATELET # BLD AUTO: 261 THOUSAND/UL (ref 140–400)
PMV BLD REES-ECKER: 11.6 FL (ref 7.5–12.5)
RBC # BLD AUTO: 4.46 MILLION/UL (ref 3.8–5.1)
TIBC SERPL-MCNC: 407 MCG/DL (CALC) (ref 250–450)
VIT B12 SERPL-MCNC: 776 PG/ML (ref 200–1100)
WBC # BLD AUTO: 7.6 THOUSAND/UL (ref 3.8–10.8)
ZINC SERPL-MCNC: 74 MCG/DL (ref 60–130)

## 2025-02-11 DIAGNOSIS — D50.0 IRON DEFICIENCY ANEMIA DUE TO CHRONIC BLOOD LOSS: Primary | ICD-10-CM

## 2025-02-11 RX ORDER — FERROUS SULFATE 325(65) MG
325 TABLET ORAL
Qty: 30 TABLET | Refills: 11 | Status: SHIPPED | OUTPATIENT
Start: 2025-02-11 | End: 2025-02-13 | Stop reason: SDUPTHER

## 2025-02-12 ENCOUNTER — HOSPITAL ENCOUNTER (OUTPATIENT)
Facility: HOSPITAL | Age: 55
Discharge: HOME | End: 2025-02-12
Payer: COMMERCIAL

## 2025-02-12 VITALS
WEIGHT: 174 LBS | HEART RATE: 73 BPM | SYSTOLIC BLOOD PRESSURE: 142 MMHG | RESPIRATION RATE: 16 BRPM | TEMPERATURE: 98.4 F | HEIGHT: 61 IN | DIASTOLIC BLOOD PRESSURE: 88 MMHG | BODY MASS INDEX: 32.85 KG/M2 | OXYGEN SATURATION: 95 %

## 2025-02-12 DIAGNOSIS — K62.89 RECTAL PAIN: ICD-10-CM

## 2025-02-12 DIAGNOSIS — R10.9 INTRACTABLE ABDOMINAL PAIN: ICD-10-CM

## 2025-02-12 PROCEDURE — 2550000001 HC RX 255 CONTRASTS: Performed by: ANESTHESIOLOGY

## 2025-02-12 PROCEDURE — 64999 UNLISTED PX NERVOUS SYSTEM: CPT | Performed by: ANESTHESIOLOGY

## 2025-02-12 PROCEDURE — 3700000012 HC SEDATION LEVEL 5+ TIME - INITIAL 15 MINUTES 5/> YEARS

## 2025-02-12 PROCEDURE — 2500000004 HC RX 250 GENERAL PHARMACY W/ HCPCS (ALT 636 FOR OP/ED): Performed by: ANESTHESIOLOGY

## 2025-02-12 RX ORDER — FENTANYL CITRATE 50 UG/ML
INJECTION, SOLUTION INTRAMUSCULAR; INTRAVENOUS
Status: COMPLETED | OUTPATIENT
Start: 2025-02-12 | End: 2025-02-12

## 2025-02-12 RX ORDER — METHYLPREDNISOLONE ACETATE 40 MG/ML
INJECTION, SUSPENSION INTRA-ARTICULAR; INTRALESIONAL; INTRAMUSCULAR; SOFT TISSUE
Status: COMPLETED | OUTPATIENT
Start: 2025-02-12 | End: 2025-02-12

## 2025-02-12 RX ORDER — LIDOCAINE HYDROCHLORIDE 5 MG/ML
INJECTION, SOLUTION INFILTRATION; INTRAVENOUS
Status: COMPLETED | OUTPATIENT
Start: 2025-02-12 | End: 2025-02-12

## 2025-02-12 RX ORDER — MIDAZOLAM HYDROCHLORIDE 1 MG/ML
INJECTION, SOLUTION INTRAMUSCULAR; INTRAVENOUS
Status: COMPLETED | OUTPATIENT
Start: 2025-02-12 | End: 2025-02-12

## 2025-02-12 RX ORDER — BUPIVACAINE HYDROCHLORIDE 2.5 MG/ML
INJECTION, SOLUTION EPIDURAL; INFILTRATION; INTRACAUDAL
Status: COMPLETED | OUTPATIENT
Start: 2025-02-12 | End: 2025-02-12

## 2025-02-12 RX ADMIN — IOHEXOL 1 ML: 240 INJECTION, SOLUTION INTRATHECAL; INTRAVASCULAR; INTRAVENOUS; ORAL at 09:54

## 2025-02-12 RX ADMIN — LIDOCAINE HYDROCHLORIDE 3 ML: 5 INJECTION, SOLUTION INFILTRATION at 09:54

## 2025-02-12 RX ADMIN — FENTANYL CITRATE 50 MCG: 50 INJECTION INTRAMUSCULAR; INTRAVENOUS at 09:46

## 2025-02-12 RX ADMIN — FENTANYL CITRATE 50 MCG: 50 INJECTION INTRAMUSCULAR; INTRAVENOUS at 09:51

## 2025-02-12 RX ADMIN — METHYLPREDNISOLONE ACETATE 40 MG: 40 INJECTION, SUSPENSION INTRA-ARTICULAR; INTRALESIONAL; INTRAMUSCULAR; SOFT TISSUE at 09:54

## 2025-02-12 RX ADMIN — MIDAZOLAM 2 MG: 1 INJECTION INTRAMUSCULAR; INTRAVENOUS at 09:44

## 2025-02-12 RX ADMIN — BUPIVACAINE HYDROCHLORIDE 4 ML: 2.5 INJECTION, SOLUTION EPIDURAL; INFILTRATION; INTRACAUDAL; PERINEURAL at 09:54

## 2025-02-12 ASSESSMENT — PAIN - FUNCTIONAL ASSESSMENT
PAIN_FUNCTIONAL_ASSESSMENT: 0-10
PAIN_FUNCTIONAL_ASSESSMENT: WONG-BAKER FACES
PAIN_FUNCTIONAL_ASSESSMENT: 0-10
PAIN_FUNCTIONAL_ASSESSMENT: 0-10
PAIN_FUNCTIONAL_ASSESSMENT: WONG-BAKER FACES

## 2025-02-12 ASSESSMENT — ENCOUNTER SYMPTOMS
DEPRESSION: 1
OCCASIONAL FEELINGS OF UNSTEADINESS: 0
LOSS OF SENSATION IN FEET: 0

## 2025-02-12 ASSESSMENT — PAIN SCALES - GENERAL
PAINLEVEL_OUTOF10: 5 - MODERATE PAIN
PAINLEVEL_OUTOF10: 5 - MODERATE PAIN
PAINLEVEL_OUTOF10: 0 - NO PAIN
PAINLEVEL_OUTOF10: 8
PAINLEVEL_OUTOF10: 0 - NO PAIN

## 2025-02-12 NOTE — DISCHARGE INSTRUCTIONS
DISCHARGE INSTRUCTIONS FOR INJECTIONS       After most injections, it is recommended that you relax and limit your activity for the remainder of the day unless you have been told otherwise by your pain physician.  You should not drive a car, operate machinery, or make important legal decisions unless otherwise directed by your pain physician.  You may resume your normal activity, including exercise, tomorrow.      Keep a written pain diary of how much pain relief you experienced following the injection procedure and the length of time of pain relief you experienced pain relief. Following diagnostic injections like medial branch nerve blocks, sacroiliac joint blocks, stellate ganglion injections and other blocks, it is very important you record the specific amount of pain relief you experienced immediately after the injectionand how long it lasted. Your doctor will ask you for this information at your follow up visit.     For all injections, please keep the injection site dry and inspect the site for a couple of days. You may remove the Band-Aid the day of the injection at any time.     Some discomfort, bruising or slight swelling may occur at the injection site. This is not abnormal if it occurs.  If needed you may:    -Take over the counter medication such as Tylenol or Motrin.   -Apply an ice pack for 30 minutes, 2 to 3 times a day for the first 24 hours.     You may shower today; no soaking baths, hot tubs, whirlpools or swimming pools for two days.      If you are given steroids in your injection, it may take 3-5 days for the steroid medication to take effect. You may notice a worsening of your symptoms for 1-2 days after the injection. This is not abnormal.  You may use acetaminophen, ibuprofen, or prescription medication that your doctor may have prescribed for you if you need to do so.     A few common side effects of steroids include facial flushing, sweating, restlessness, irritability,difficulty sleeping,  increase in blood sugar, and increased blood pressure. If you have diabetes, please monitor your blood sugar at least once a day for at least 5 days. If you have poorly controlled high blood pressure, monitoryour blood pressure for at least 2 days and contact your primary care physician if these numbers are unusually high for you.      If you take aspirin or non-steroidal anti-inflammatory drugs (examples are Motrin, Advil, ibuprofen, Naprosyn, Voltaren, Relafen, etc.) you may restart these this evening, but stop taking it 3 days before your next appointment, unless instructed otherwiseby your physician.      You do not need to discontinue non-aspirin-containing pain medications prior to an injection (examples: Celebrex, tramadol, hydrocodone and acetaminophen).      If you take a blood thinning medication (Coumadin, Lovenox, Fragmin,Ticlid, Plavix, Pradaxa, etc.), please discuss this with your primary care physician/cardiologist and your pain physician. These medications MUST be discontinued before you can have an injection safely, without the risk of uncontrolled bleeding. If these medications are not discontinued for an appropriate period of time, you will not be able to receivean injection. Please adhere to instructions given to you about when to restart your blood thinning medication. If you have any questions please reach out to our team.    If you are taking Coumadin, please have your INR checked the morning of your procedure and bring the result to your appointment unless otherwise instructed. If your INR is over 1.2, your injection may need to be rescheduled to avoid uncontrolled bleeding from the needle placement.     Call [facility]  and ask for Pain Management at [phone] between 8am-4pm Monday - Friday if you are experiencing the following:    If you received an epidural or spinal injection:    -Headache that doesnot go away with medicine, is worse when sitting or standing up, and is greatly  relieved upon lying down.   -Severe pain worse than or different than your baseline pain.   -Chills or fever (101º F or greater).   -Drainage or signs of infection at the injection site     Go directly to the Emergency Department if you are experiencing the following and received an epidural or spinal injection:   -Abrupt weakness or progressive weakness in your legs that starts after you leave the clinic.   -Abrupt severe or worsening numbness in your legs.   -Inability to urinate after the injection or loss of bowel or bladder control without the urge to defecate or urinate.     If you have a clinical question that cannot wait until your next appointment, please call [phone] between 8am-4pm Monday - Friday or send a [facility] message. We do our best to return all non-emergency messages within 24 hours, Monday - Friday. A nurse or physician will return your message. You may also try calling and they will do their best to answer your question(s):  - Dr. Arnaldo Villanueva's nurse ([phone])      If you need to cancel an appointment, please call the scheduling staff at [phone] during normal business hours or leave a message at least 24 hours in advance.     If you are going to be sedated for your next procedure, you MUST have responsible adult who can legally drive accompany you home. You cannot eat or drink for at least eight hours prior to the planned procedure if you are going to receive sedation. You may take your non-blood thinning medications with a small sip of water.

## 2025-02-12 NOTE — H&P
Pain Management H&P    History Of Present Illness  Maureen See is a 54 y.o. female presents for procedure state below. Endorses no changes in past medical history or medical health since last seen in clinic.     ASA: 2  Mallampati: 3  Past Medical History  She has a past medical history of Anemia, Anxiety, Asthma, Depression, Familial hypercholesterolemia, Fatty liver, GERD (gastroesophageal reflux disease), Ileostomy in place (Multi) (2023), Inflammatory bowel disease, Iron deficiency anemia, Obesity, Prediabetes, Ulcerative colitis, and Vitamin D deficiency.    Surgical History  She has a past surgical history that includes  section, low transverse (); [place] tooth extraction; Total colectomy (2023); and Flexible sigmoidoscopy (2024).     Social History  She reports that she quit smoking about 9 years ago. Her smoking use included cigarettes. She started smoking about 40 years ago. She has a 15.7 pack-year smoking history. She has never used smokeless tobacco. She reports that she does not currently use alcohol after a past usage of about 1.0 standard drink of alcohol per week. She reports that she does not use drugs.    Family History  Family History   Problem Relation Name Age of Onset    Arthritis Mother Pam Cruz     Hypertension Mother Pam Cruz     Heart disease Father Edlorrie Cruz     Hypertension Father Jordan Cruz     Colon polyps Father Jordan Cruz     Cancer Maternal Grandmother Morena cid     Diabetes Maternal Grandmother Morena cid     Colon cancer Maternal Grandmother Morena cid     Cancer Paternal Grandfather Tyrell morenoaugust     Cancer Father's Brother Gianfranco Cruz     Diabetes Son Adam Noar     Diabetes Brother Raleigh Nancy     Hypertension Brother Raleigh Morenoaugust         Allergies  Amoxicillin, Aspirin, Azithromycin, Clindamycin, and Sulfamethoxazole    Review of Symptoms:   Constitutional: Negative for chills, diaphoresis or fever  HENT: Negative for neck  swelling  Eyes:.  Negative for eye pain  Respiratory:.  Negative for cough, shortness of breath or wheezing    Cardiovascular:.  Negative for chest pain or palpitations  Gastrointestinal:.  Negative for abdominal pain, nausea and vomiting  Genitourinary:.  Negative for urgency  Musculoskeletal:  Positive for back pain. Positive for joint pain. Denies falls within the past 3 months.  Skin: Negative for wounds or itching   Neurological: Negative for dizziness, seizures, loss of consciousness and weakness  Endo/Heme/Allergies: Does not bruise/bleed easily  Psychiatric/Behavioral: Negative for depression. The patient does not appear anxious.       PHYSICAL EXAM  Vitals signs reviewed  Constitutional:       General: Not in acute distress     Appearance: Normal appearance. Not ill-appearing.  HENT:     Head: Normocephalic and atraumatic  Eyes:     Conjunctiva/sclera: Conjunctivae normal  Cardiovascular:     Rate and Rhythm: Normal rate and regular rhythm  Pulmonary:     Effort: No respiratory distress  Abdominal:     Palpations: Abdomen is soft  Musculoskeletal: DIANE  Skin:     General: Skin is warm and dry  Neurological:     General: No focal deficit present  Psychiatric:         Mood and Affect: Mood normal         Behavior: Behavior normal     Last Recorded Vitals  There were no vitals taken for this visit.    Relevant Results  Current Outpatient Medications   Medication Instructions    dicyclomine (BENTYL) 10 mg, oral, 4 times daily before meals and nightly, Take with meals and at bedtime    escitalopram (LEXAPRO) 20 mg, Nightly    esomeprazole (NEXIUM) 40 mg, oral, 2 times daily, Do not open capsule.    ferrous sulfate (325 mg ferrous sulfate) (FEOSOL) 325 mg, oral, Daily with breakfast    gabapentin (NEURONTIN) 300 mg, oral, 3 times daily    norethindrone (MICRONOR) 0.35 mg, oral, Daily    rosuvastatin (CRESTOR) 20 mg, oral, Daily       No results found for this or any previous visit from the past 1000 days.     No  image results found.       1. Intractable abdominal pain  FL pain management    FL pain management    Sympathetic Block    Sympathetic Block      2. Rectal pain  FL pain management    [place] pain management    Sympathetic Block    Sympathetic Block           ASSESSMENT/PLAN  Maureen See is a 54 y.o. female presenting for ganglion impar block    Patient denies any recent antibiotic use or infections, denies any blood thinner use, and denies contrast or local anesthetic allergies     Risks, benefits, alternatives discussed. All questions answered to the best of my ability. Patient agrees to proceed.      Our plan is as follows:  - Proceed with aforementioned procedure          Jake Azar DO   Pain fellow

## 2025-02-13 DIAGNOSIS — K21.9 GASTROESOPHAGEAL REFLUX DISEASE WITHOUT ESOPHAGITIS: ICD-10-CM

## 2025-02-13 DIAGNOSIS — D50.0 IRON DEFICIENCY ANEMIA DUE TO CHRONIC BLOOD LOSS: ICD-10-CM

## 2025-02-13 DIAGNOSIS — R10.2 PELVIC PAIN IN FEMALE: ICD-10-CM

## 2025-02-13 DIAGNOSIS — R10.30 LOWER ABDOMINAL PAIN: ICD-10-CM

## 2025-02-13 RX ORDER — ESOMEPRAZOLE MAGNESIUM 40 MG/1
40 CAPSULE, DELAYED RELEASE ORAL 2 TIMES DAILY
Qty: 180 CAPSULE | Refills: 3 | Status: SHIPPED | OUTPATIENT
Start: 2025-02-13 | End: 2026-02-13

## 2025-02-13 RX ORDER — DICYCLOMINE HYDROCHLORIDE 10 MG/1
10 CAPSULE ORAL
Qty: 360 CAPSULE | Refills: 3 | Status: SHIPPED | OUTPATIENT
Start: 2025-02-13 | End: 2026-02-13

## 2025-02-13 RX ORDER — GABAPENTIN 600 MG/1
300 TABLET ORAL 3 TIMES DAILY
Qty: 135 TABLET | Refills: 1 | Status: SHIPPED | OUTPATIENT
Start: 2025-02-13

## 2025-02-13 RX ORDER — FERROUS SULFATE 325(65) MG
325 TABLET ORAL
Qty: 90 TABLET | Refills: 3 | Status: SHIPPED | OUTPATIENT
Start: 2025-02-13 | End: 2026-02-13

## 2025-02-18 ENCOUNTER — HOSPITAL ENCOUNTER (OUTPATIENT)
Dept: RADIOLOGY | Facility: HOSPITAL | Age: 55
Discharge: HOME | End: 2025-02-18
Payer: COMMERCIAL

## 2025-02-18 DIAGNOSIS — R10.30 LOWER ABDOMINAL PAIN: ICD-10-CM

## 2025-02-18 DIAGNOSIS — R87.9 ABNORMAL VAGINAL FLUIDS: ICD-10-CM

## 2025-02-19 NOTE — PROGRESS NOTES
Urogynecology  Provider:  Azul Clark MD  [phone]              ASSESSMENT AND PLAN:     Problem List Items Addressed This Visit    None          I spent a total of *** minutes in face to face and non face to face time.      Azul Clark MD              HISTORY OF PRESENT ILLNESS:     Sent in consultation by Jessy Mo for possible vesicovaginal fistula.     History of ulcerative colitis status post total colectomy and proctectomy with end ileostomy-persistent pain and pelvis and stump of ostomy without relief from gabapentin.      Did not get imaging oreder by [facility].   CT Abd/pelvis with contract 2024 no bladder abnl seen    Record Review:     Prolapse Symptoms :     Urinary Symptoms:     Bowel Symptoms:     Sexual Activity:          Past Medical History:       Past Medical History:   Diagnosis Date    Anemia     Anxiety     Asthma     Depression     Familial hypercholesterolemia     Fatty liver     GERD (gastroesophageal reflux disease)     Ileostomy in place (Multi) 2023    Inflammatory bowel disease     Iron deficiency anemia     Obesity     Prediabetes     2023 A1C 5.5%    Ulcerative colitis     Vitamin D deficiency           Past Surgical History:       Past Surgical History:   Procedure Laterality Date     SECTION, LOW TRANSVERSE  2000    10/14/1992 & 2000    FLEXIBLE SIGMOIDOSCOPY  2024    TOTAL COLECTOMY  2023 OPERATION/PROCEDURE: Single-incision laparoscopic total abdominal colectomy with end ileostomy    WISDOM TOOTH EXTRACTION           Medications:       Prior to Admission medications    Medication Sig Start Date End Date Taking? Authorizing Provider   dicyclomine (Bentyl) 10 mg capsule Take 1 capsule (10 mg) by mouth 4 times a day before meals. Take with meals and at bedtime 25  Jessy Mo, APRN-CNP   escitalopram (Lexapro) 20 mg tablet Take 1 tablet (20 mg) by mouth once daily at bedtime. 22   Historical  MD Keren   esomeprazole (NexIUM) 40 mg DR capsule Take 1 capsule (40 mg) by mouth 2 times a day. Do not open capsule. 2/13/25 2/13/26  NELLIE Flowers   ferrous sulfate, 325 mg ferrous sulfate, (FeosoL) tablet Take 1 tablet (325 mg) by mouth once daily with breakfast. 2/13/25 2/13/26  NELLIE Flowers   gabapentin (Neurontin) 600 mg tablet Take 0.5 tablets (300 mg) by mouth 3 times a day. 2/13/25   Brianna Hernandez MD   norethindrone (Micronor) 0.35 mg tablet Take 1 tablet (0.35 mg) over 28 days by mouth once daily for 120 doses. 9/9/24 1/7/25  NELLIE Flowers   rosuvastatin (Crestor) 20 mg tablet TAKE 1 TABLET BY MOUTH EVERY DAY 1/3/25   Brianna Hernandez MD   esomeprazole (NexIUM) 40 mg DR capsule Take 1 capsule (40 mg) by mouth 2 times a day. Do not open capsule. 2/14/24 2/13/25  NELLIE Flowers   ferrous sulfate, 325 mg ferrous sulfate, (FeosoL) tablet Take 1 tablet (325 mg) by mouth once daily with breakfast. 2/11/25 2/13/25  NELLIE Flowers   gabapentin (Neurontin) 600 mg tablet Take 0.5 tablets (300 mg) by mouth 3 times a day. 12/31/24 2/13/25  Deangelo Haddad MD        ROS  Review of Systems       PHYSICAL EXAM:      There were no vitals taken for this visit.     No LMP recorded. Patient has had an implant.      Declines chaperone for physical exam.    PVR=     Well developed, well nourished, in no apparent distress.   Neurologic/Psychiatric:  Awake, Alert and Oriented times 3.  Affect normal. Normal cranial nerves  Pulm: breathing without effort  Sexual maturity: Adam stage V  Abd exam: soft, non-tender      GENITAL/URINARY:       External Genitalia:  The patient has normal appearing external genitalia, normal skenes and bartholins glands, and a normal hair distribution.  Her vulva is without lesions, erythema or discharge.  It is non-tender with appropriate sensation.     Urethral Meatus:  Size normal, Location normal, Lesions absent, Prolapse absent,       Urethra:  Fullness absent, Masses absent,      Bladder:  Fullness absent, Masses absent, Tenderness absent,      Vagina:  General appearance normal, Estrogen effect normal, Discharge absent, Lesions absent, ***     Cervix: Normal, no discharge.   Uterus:  {UTERUS, EXAM:74232}  Adnexa:  {exam; adnexa:17274}    Anus/Perineum:  Lesions absent and Masses absent {Exam; anus:98173}  {Exam; anus and perineum:23143}    Stress urinary incontinence *** demonstrable.       POP-Q  The patient has Stage *** Prolapse.    POP-Q:  Stage: {NUMBERS 0-4:75388}  Position: {SITTING STANDIN}    Aa: ***       Ba: *** C: ***   Gh: *** Pb: *** TVL: ***         Ap: *** Bp: *** D: ***             The patient has {0-5 jethro:97721} out of 5 pelvic floor muscle strength.    She {DOES/ DOES NOT:83165} have myofascial tenderness on exam.   Her highest pain score on exam is {NUMBERS 1-10:38713}        She has {0-5 jethro:59900} resting anal sphincter and *** squeeze tone.    Rectal exam: ***.     Assessment and Plan:        Azul Clark MD

## 2025-02-20 ENCOUNTER — APPOINTMENT (OUTPATIENT)
Dept: OBSTETRICS AND GYNECOLOGY | Facility: CLINIC | Age: 55
End: 2025-02-20
Payer: COMMERCIAL

## 2025-02-20 DIAGNOSIS — N39.9 URINARY DISORDER: Primary | ICD-10-CM

## 2025-02-20 DIAGNOSIS — K62.89 RECTAL PAIN: ICD-10-CM

## 2025-02-22 DIAGNOSIS — Z30.09 BIRTH CONTROL COUNSELING: ICD-10-CM

## 2025-02-24 RX ORDER — NORETHINDRONE 0.35 MG/1
TABLET ORAL
Qty: 84 TABLET | Refills: 1 | Status: SHIPPED | OUTPATIENT
Start: 2025-02-24

## 2025-02-25 ENCOUNTER — APPOINTMENT (OUTPATIENT)
Dept: OBSTETRICS AND GYNECOLOGY | Facility: CLINIC | Age: 55
End: 2025-02-25
Payer: COMMERCIAL

## 2025-02-26 ENCOUNTER — APPOINTMENT (OUTPATIENT)
Dept: GASTROENTEROLOGY | Facility: CLINIC | Age: 55
End: 2025-02-26
Payer: COMMERCIAL

## 2025-02-27 DIAGNOSIS — K62.89 RECTAL PAIN: ICD-10-CM

## 2025-03-05 ENCOUNTER — APPOINTMENT (OUTPATIENT)
Dept: RHEUMATOLOGY | Facility: CLINIC | Age: 55
End: 2025-03-05
Payer: COMMERCIAL

## 2025-03-07 ENCOUNTER — OFFICE VISIT (OUTPATIENT)
Dept: GASTROENTEROLOGY | Facility: CLINIC | Age: 55
End: 2025-03-07
Payer: COMMERCIAL

## 2025-03-07 VITALS
BODY MASS INDEX: 33.99 KG/M2 | TEMPERATURE: 97.9 F | WEIGHT: 180 LBS | DIASTOLIC BLOOD PRESSURE: 89 MMHG | HEIGHT: 61 IN | SYSTOLIC BLOOD PRESSURE: 148 MMHG | HEART RATE: 101 BPM

## 2025-03-07 DIAGNOSIS — R11.0 NAUSEA: ICD-10-CM

## 2025-03-07 DIAGNOSIS — L40.9 PSORIASIS: ICD-10-CM

## 2025-03-07 DIAGNOSIS — K21.9 GASTROESOPHAGEAL REFLUX DISEASE WITHOUT ESOPHAGITIS: ICD-10-CM

## 2025-03-07 DIAGNOSIS — R10.13 EPIGASTRIC PAIN: ICD-10-CM

## 2025-03-07 DIAGNOSIS — K51.00 ULCERATIVE PANCOLITIS WITHOUT COMPLICATION (MULTI): Primary | ICD-10-CM

## 2025-03-07 PROCEDURE — 99214 OFFICE O/P EST MOD 30 MIN: CPT | Performed by: NURSE PRACTITIONER

## 2025-03-07 PROCEDURE — 3008F BODY MASS INDEX DOCD: CPT | Performed by: NURSE PRACTITIONER

## 2025-03-07 NOTE — PATIENT INSTRUCTIONS
S/p subtotal colectomy and proctectomy with ileostomy- I would recommend adding a fiber supplement daily such as benefiber one tablespoon in a glass of water to help thicken up your stool. Concern for fistula- please follow through with the GYN exam.    Persistent rectal pain and generalized pain- please follow up with pain management and Rheumatology to be evaluated for fibromyalgia    Psoriasis- try using Eucerin cream or Aquaphor to help hydrate your skin after showering    Epigastric pain and persistent nausea- I will order an EGD to assess the upper GI tract.    You can see either myself or one of the other GI providers in the practice for follow up

## 2025-03-07 NOTE — PROGRESS NOTES
Subjective   Patient ID: Maureen See is a 54 y.o. female.    HPI  54-year-old female for follow-up visit for abdominal pain and possibly gastritis  Labs reviewed 2/7/2025 normal B12, H&H, folate, magnesium, zinc  Iron 40  She is currently seeing pain management regarding persistent rectal pain after subtotal colectomy and proctectomy with permanent ostomy history of ulcerative colitis  At the previous visit she reported having fluid coming out of her vagina concerning for a fistula we did attempt to do a CT scan however they could not get an IV for contrast on her so it was decided to hold off on that CAT scan and have her see GYN for evaluation first.    Seeing GYN - Dr Clark next month  Rheumatology next month    Psoriasis worse  No matter what she eats gets nauseated  No emesis  Using bentyl  but still then her bag fills    Objective   Physical Exam  Cardiovascular:      Rate and Rhythm: Normal rate and regular rhythm.      Pulses: Normal pulses.      Heart sounds: Normal heart sounds.   Pulmonary:      Effort: Pulmonary effort is normal.      Breath sounds: Normal breath sounds.   Abdominal:      General: Bowel sounds are normal.      Palpations: Abdomen is soft.      Tenderness: There is abdominal tenderness.      Comments: Ostomy Right mid abd         Assessment/Plan     S/p subtotal colectomy and proctectomy with ileostomy- I would recommend adding a fiber supplement daily such as benefiber one tablespoon in a glass of water to help thicken up your stool. Concern for fistula- please follow through with the GYN exam.    Psoriasis- try using Eucerin cream or Aquaphor to help hydrate your skin after showering    Persistent rectal pain and generalized pain- please follow up with pain management and Rheumatology to be evaluated for fibromyalgia    Epigastric pain and persistent nausea- I will order an EGD to assess the upper GI tract.    You can see either myself or one of the other GI providers in the practice  for follow up

## 2025-03-08 NOTE — H&P
HISTORY AND PHYSICAL UPDATE FOR PROCEDURE    History Of Present Illness  Maureen See is a 54 y.o. female presenting with ulcerative colitis who underwent a proctectomy.  Patient has ongoing perineal/rectal area pain   Here for ganglion impar block    This note is intended to be an update to the H&P from the last office consult note. Please refer to the last pain management consult note for full H&P.    she denies any recent antibiotic use or infections, she denies any blood thinner use , and she denies contrast or local anesthetic allergies     Pre-sedation evaluation:  ASA Classification (bolded):   ASA I: Healthy patient, non-smoking, no none or minimal alcohol use  ASA II: Patient with mild systemic disease, without substantiative functional limitations.  Current smoker, social alcohol drinker, pregnancy, obesity (BMI 30-40)DM/HTN,, well-controlled mild lung disease  ASA III: Patient with severe systemic disease; substantiative of functional limitation; One or more moderate to severe diseases: Poorly controlled DM/HTN, COPD, morbid obesity (BMI>40), active hepatitis, alcohol abuse/dependence, implanted pacemaker, moderate reduction of ejection fraction, ESRD on dialysis, history (>3months) of MI, CVA, TIA or CAD/stents  ASA IV: Patient with severe systemic disease that is a constant threat to life; recent (<3 months) MI, CVA, TIA or CAD/stents, ongoing cardiac ischemia or severe valvular dysfunction, severely reduced ejection fraction, shock, sepsis, DIC, ESRD not undergoing regular scheduled dialysis    Mallampati score (bolded):   Class I: Complete visualization of the soft palate  Class II: Complete visualization of the uvula  Class III: Visualization of only the base of the uvula  Class IV: Soft palate is not visible at all    Past Medical History  Past Medical History:   Diagnosis Date    Anemia     Anxiety     Asthma     Depression     Familial hypercholesterolemia     Fatty liver     GERD  (gastroesophageal reflux disease)     Ileostomy in place (Multi) 2023    Inflammatory bowel disease     Iron deficiency anemia     Obesity     Prediabetes     2023 A1C 5.5%    Ulcerative colitis     Vitamin D deficiency        Surgical History  Past Surgical History:   Procedure Laterality Date     SECTION, LOW TRANSVERSE  2000    10/14/1992 & 2000    FLEXIBLE SIGMOIDOSCOPY  2024    TOTAL COLECTOMY  2023 OPERATION/PROCEDURE: Single-incision laparoscopic total abdominal colectomy with end ileostomy    WISDOM TOOTH EXTRACTION          Social History  She reports that she quit smoking about 9 years ago. Her smoking use included cigarettes. She started smoking about 40 years ago. She has a 15.7 pack-year smoking history. She has never used smokeless tobacco. She reports that she does not currently use alcohol after a past usage of about 1.0 standard drink of alcohol per week. She reports that she does not use drugs.    Family History  Family History   Problem Relation Name Age of Onset    Arthritis Mother Pam Cruz     Hypertension Mother Pam Cruz     Heart disease Father Jordan Cruz     Hypertension Father Jordan Cruz     Colon polyps Father Jordan Cruz     Cancer Maternal Grandmother Morena cid     Diabetes Maternal Grandmother Morena cid     Colon cancer Maternal Grandmother Morena cid     Cancer Paternal Grandfather Tyrell cruz     Cancer Father's Brother Gianfranco Cruz     Diabetes Son Adam Noar     Diabetes Brother Raleigh Cruz     Hypertension Brother Raleigh Cruz         Allergies  Amoxicillin, Aspirin, Azithromycin, Clindamycin, and Sulfamethoxazole    Review of Systems   12 point ROS done and negative except for the above.   Physical Exam     General: NAD, well groomed, well nourished  Eyes: Non-icteric sclera, EOMI  Ears, Nose, Mouth, and Throat: External ears and nose appear to be without deformity or rash. No lesions or masses noted. Hearing  "is grossly intact.   Neck: Trachea midline  Respiratory: Nonlabored breathing   Cardiovascular: No peripheral edema   Skin: No rashes or open lesions/ulcers identified on skin.    Last Recorded Vitals  There were no vitals taken for this visit.    Relevant Results  Current Outpatient Medications   Medication Instructions    dicyclomine (BENTYL) 10 mg, oral, 4 times daily before meals and nightly, Take with meals and at bedtime    escitalopram (LEXAPRO) 20 mg, Nightly    esomeprazole (NEXIUM) 40 mg, oral, 2 times daily, Do not open capsule.    ferrous sulfate (FEOSOL) 325 mg, oral, Daily with breakfast    gabapentin (NEURONTIN) 300 mg, oral, 3 times daily    Jencycla 0.35 mg tablet TAKE 1 TABLET (0.35 MG) OVER 28 DAYS BY MOUTH ONCE DAILY  DOSES.    rosuvastatin (CRESTOR) 20 mg, oral, Daily      Lab Results   Component Value Date    WBC 7.6 02/07/2025    HGB 12.3 02/07/2025    HCT 39.6 02/07/2025    MCV 88.8 02/07/2025     02/07/2025      Lab Results   Component Value Date    INR 1.0 11/22/2024    INR 1.1 02/08/2023    PROTIME 11.7 11/22/2024    PROTIME 13.2 02/08/2023     No results found for: \"PTT\"  Lab Results   Component Value Date    GLUCOSE 98 12/31/2024    CALCIUM 8.9 12/31/2024     12/31/2024    K 3.9 12/31/2024    CO2 30 12/31/2024     12/31/2024    BUN 16 12/31/2024    CREATININE 0.93 12/31/2024              Assessment/Plan   Maureen See is a 54 y.o. F who presents for ganglion impar block.     Risks, benefits, alternatives discussed. All questions answered to the best of my ability. Patient agrees to proceed. Consent signed and patient marked appropriately.    -We will proceed with planned procedure  -Discussed with the patient that once appropriate from a recovery standpoint, they should continue physical therapy exercises at least 2-3 times per week for best long term outcomes  - discussed with the patient that if they take blood thinners, they may resume them in " 24hrs        Jabari Goss MD  Interventional Pain Fellow, PGY-5  [facility]

## 2025-03-10 ENCOUNTER — HOSPITAL ENCOUNTER (OUTPATIENT)
Facility: HOSPITAL | Age: 55
Discharge: HOME | End: 2025-03-10
Payer: COMMERCIAL

## 2025-03-10 ENCOUNTER — APPOINTMENT (OUTPATIENT)
Facility: HOSPITAL | Age: 55
End: 2025-03-10
Payer: COMMERCIAL

## 2025-03-10 VITALS
SYSTOLIC BLOOD PRESSURE: 159 MMHG | OXYGEN SATURATION: 95 % | TEMPERATURE: 97.2 F | DIASTOLIC BLOOD PRESSURE: 101 MMHG | RESPIRATION RATE: 16 BRPM | HEART RATE: 86 BPM

## 2025-03-10 DIAGNOSIS — K62.89 RECTAL PAIN: ICD-10-CM

## 2025-03-10 PROCEDURE — 2500000004 HC RX 250 GENERAL PHARMACY W/ HCPCS (ALT 636 FOR OP/ED): Performed by: ANESTHESIOLOGY

## 2025-03-10 PROCEDURE — 64999 UNLISTED PX NERVOUS SYSTEM: CPT | Performed by: ANESTHESIOLOGY

## 2025-03-10 PROCEDURE — 2550000001 HC RX 255 CONTRASTS: Performed by: ANESTHESIOLOGY

## 2025-03-10 RX ORDER — FENTANYL CITRATE 50 UG/ML
INJECTION, SOLUTION INTRAMUSCULAR; INTRAVENOUS
Status: COMPLETED | OUTPATIENT
Start: 2025-03-10 | End: 2025-03-10

## 2025-03-10 RX ORDER — METHYLPREDNISOLONE ACETATE 40 MG/ML
INJECTION, SUSPENSION INTRA-ARTICULAR; INTRALESIONAL; INTRAMUSCULAR; SOFT TISSUE
Status: COMPLETED | OUTPATIENT
Start: 2025-03-10 | End: 2025-03-10

## 2025-03-10 RX ORDER — MIDAZOLAM HYDROCHLORIDE 1 MG/ML
INJECTION, SOLUTION INTRAMUSCULAR; INTRAVENOUS
Status: COMPLETED | OUTPATIENT
Start: 2025-03-10 | End: 2025-03-10

## 2025-03-10 RX ORDER — LIDOCAINE HYDROCHLORIDE 5 MG/ML
INJECTION, SOLUTION INFILTRATION; INTRAVENOUS
Status: COMPLETED | OUTPATIENT
Start: 2025-03-10 | End: 2025-03-10

## 2025-03-10 RX ORDER — BUPIVACAINE HYDROCHLORIDE 2.5 MG/ML
INJECTION, SOLUTION EPIDURAL; INFILTRATION; INTRACAUDAL
Status: COMPLETED | OUTPATIENT
Start: 2025-03-10 | End: 2025-03-10

## 2025-03-10 RX ADMIN — IOHEXOL 1 ML: 240 INJECTION, SOLUTION INTRATHECAL; INTRAVASCULAR; INTRAVENOUS; ORAL at 11:21

## 2025-03-10 RX ADMIN — METHYLPREDNISOLONE ACETATE 40 MG: 40 INJECTION, SUSPENSION INTRA-ARTICULAR; INTRALESIONAL; INTRAMUSCULAR; SOFT TISSUE at 11:21

## 2025-03-10 RX ADMIN — MIDAZOLAM 2 MG: 1 INJECTION INTRAMUSCULAR; INTRAVENOUS at 11:13

## 2025-03-10 RX ADMIN — LIDOCAINE HYDROCHLORIDE 4 ML: 5 INJECTION, SOLUTION INFILTRATION at 11:20

## 2025-03-10 RX ADMIN — FENTANYL CITRATE 100 MCG: 50 INJECTION INTRAMUSCULAR; INTRAVENOUS at 11:14

## 2025-03-10 RX ADMIN — BUPIVACAINE HYDROCHLORIDE 4 ML: 2.5 INJECTION, SOLUTION EPIDURAL; INFILTRATION; INTRACAUDAL; PERINEURAL at 11:21

## 2025-03-10 ASSESSMENT — PAIN - FUNCTIONAL ASSESSMENT
PAIN_FUNCTIONAL_ASSESSMENT: 0-10
PAIN_FUNCTIONAL_ASSESSMENT: WONG-BAKER FACES
PAIN_FUNCTIONAL_ASSESSMENT: WONG-BAKER FACES
PAIN_FUNCTIONAL_ASSESSMENT: 0-10
PAIN_FUNCTIONAL_ASSESSMENT: 0-10

## 2025-03-10 ASSESSMENT — PAIN SCALES - GENERAL
PAINLEVEL_OUTOF10: 8
PAINLEVEL_OUTOF10: 7
PAINLEVEL_OUTOF10: 10 - WORST POSSIBLE PAIN

## 2025-03-10 NOTE — DISCHARGE INSTRUCTIONS
DISCHARGE INSTRUCTIONS FOR INJECTIONS     You underwent ganglion impar block today    After most injections, it is recommended that you relax and limit your activity for the remainder of the day unless you have been told otherwise by your pain physician.  You should not drive a car, operate machinery, or make important legal decisions unless otherwise directed by your pain physician.  You may resume your normal activity, including exercise, tomorrow.      Keep a written pain diary of how much pain relief you experienced following the injection procedure and the length of time of pain relief you experienced pain relief. Following diagnostic injections like medial branch nerve blocks, sacroiliac joint blocks, stellate ganglion injections and other blocks, it is very important you record the specific amount of pain relief you experienced immediately after the injectionand how long it lasted. Your doctor will ask you for this information at your follow up visit.     For all injections, please keep the injection site dry and inspect the site for a couple of days. You may remove the Band-Aid the day of the injection at any time.     Some discomfort, bruising or slight swelling may occur at the injection site. This is not abnormal if it occurs.  If needed you may:    -Take over the counter medication such as Tylenol or Motrin.   -Apply an ice pack for 30 minutes, 2 to 3 times a day for the first 24 hours.     You may shower today; no soaking baths, hot tubs, whirlpools or swimming pools for two days.      If you are given steroids in your injection, it may take 3-5 days for the steroid medication to take effect. You may notice a worsening of your symptoms for 1-2 days after the injection. This is not abnormal.  You may use acetaminophen, ibuprofen, or prescription medication that your doctor may have prescribed for you if you need to do so.     A few common side effects of steroids include facial flushing, sweating,  restlessness, irritability,difficulty sleeping, increase in blood sugar, and increased blood pressure. If you have diabetes, please monitor your blood sugar at least once a day for at least 5 days. If you have poorly controlled high blood pressure, monitoryour blood pressure for at least 2 days and contact your primary care physician if these numbers are unusually high for you.      If you take aspirin or non-steroidal anti-inflammatory drugs (examples are Motrin, Advil, ibuprofen, Naprosyn, Voltaren, Relafen, etc.) you may restart these this evening, but stop taking it 3 days before your next appointment, unless instructed otherwiseby your physician.      You do not need to discontinue non-aspirin-containing pain medications prior to an injection (examples: Celebrex, tramadol, hydrocodone and acetaminophen).      If you take a blood thinning medication (Coumadin, Lovenox, Fragmin,Ticlid, Plavix, Pradaxa, etc.), please discuss this with your primary care physician/cardiologist and your pain physician. These medications MUST be discontinued before you can have an injection safely, without the risk of uncontrolled bleeding. If these medications are not discontinued for an appropriate period of time, you will not be able to receivean injection. Please adhere to instructions given to you about when to restart your blood thinning medication. If you have any questions please reach out to our team.    If you are taking Coumadin, please have your INR checked the morning of your procedure and bring the result to your appointment unless otherwise instructed. If your INR is over 1.2, your injection may need to be rescheduled to avoid uncontrolled bleeding from the needle placement.     Call [facility]  and ask for Pain Management at [phone] between 8am-4pm Monday - Friday if you are experiencing the following:    If you received an epidural or spinal injection:    -Headache that doesnot go away with medicine, is worse  when sitting or standing up, and is greatly relieved upon lying down.   -Severe pain worse than or different than your baseline pain.   -Chills or fever (101º F or greater).   -Drainage or signs of infection at the injection site     Go directly to the Emergency Department if you are experiencing the following and received an epidural or spinal injection:   -Abrupt weakness or progressive weakness in your legs that starts after you leave the clinic.   -Abrupt severe or worsening numbness in your legs.   -Inability to urinate after the injection or loss of bowel or bladder control without the urge to defecate or urinate.     If you have a clinical question that cannot wait until your next appointment, please call [phone] between 8am-4pm Monday - Friday or send a [email or web address] message. We do our best to return all non-emergency messages within 24 hours, Monday - Friday. A nurse or physician will return your message. You may also try calling and they will do their best to answer your question(s):  - Dr. Arnaldo Villanueva's nurse ([phone])  - Dr. Geremias Dukes/Dr. Winter's nurse ([phone])  - Dr. Shreya Brock/Lilian's nurse ([phone])     If you need to cancel an appointment, please call the scheduling staff at [phone] during normal business hours or leave a message at least 24 hours in advance.     If you are going to be sedated for your next procedure, you MUST have responsible adult who can legally drive accompany you home. You cannot eat or drink for at least eight hours prior to the planned procedure if you are going to receive sedation. You may take your non-blood thinning medications with a small sip of water.

## 2025-03-16 ENCOUNTER — PATIENT MESSAGE (OUTPATIENT)
Dept: SURGERY | Facility: CLINIC | Age: 55
End: 2025-03-16
Payer: COMMERCIAL

## 2025-03-18 ENCOUNTER — HOSPITAL ENCOUNTER (OUTPATIENT)
Dept: GASTROENTEROLOGY | Facility: HOSPITAL | Age: 55
Discharge: HOME | End: 2025-03-18
Payer: COMMERCIAL

## 2025-03-18 ENCOUNTER — ANESTHESIA (OUTPATIENT)
Dept: GASTROENTEROLOGY | Facility: HOSPITAL | Age: 55
End: 2025-03-18
Payer: COMMERCIAL

## 2025-03-18 ENCOUNTER — ANESTHESIA EVENT (OUTPATIENT)
Dept: GASTROENTEROLOGY | Facility: HOSPITAL | Age: 55
End: 2025-03-18
Payer: COMMERCIAL

## 2025-03-18 VITALS
WEIGHT: 175.27 LBS | DIASTOLIC BLOOD PRESSURE: 70 MMHG | HEIGHT: 61 IN | TEMPERATURE: 97.3 F | SYSTOLIC BLOOD PRESSURE: 118 MMHG | BODY MASS INDEX: 33.09 KG/M2 | RESPIRATION RATE: 18 BRPM | OXYGEN SATURATION: 98 % | HEART RATE: 78 BPM

## 2025-03-18 DIAGNOSIS — R10.13 EPIGASTRIC PAIN: ICD-10-CM

## 2025-03-18 DIAGNOSIS — R11.0 NAUSEA: ICD-10-CM

## 2025-03-18 DIAGNOSIS — K21.9 GASTROESOPHAGEAL REFLUX DISEASE WITHOUT ESOPHAGITIS: ICD-10-CM

## 2025-03-18 LAB — PREGNANCY TEST URINE, POC: NEGATIVE

## 2025-03-18 PROCEDURE — 43251 EGD REMOVE LESION SNARE: CPT | Performed by: STUDENT IN AN ORGANIZED HEALTH CARE EDUCATION/TRAINING PROGRAM

## 2025-03-18 PROCEDURE — 3700000002 HC GENERAL ANESTHESIA TIME - EACH INCREMENTAL 1 MINUTE

## 2025-03-18 PROCEDURE — 81025 URINE PREGNANCY TEST: CPT | Performed by: ANESTHESIOLOGY

## 2025-03-18 PROCEDURE — A43251 PR EDG REMOVAL TUMOR POLYP/OTHER LESION SNARE TECH

## 2025-03-18 PROCEDURE — 3700000001 HC GENERAL ANESTHESIA TIME - INITIAL BASE CHARGE

## 2025-03-18 PROCEDURE — 2500000004 HC RX 250 GENERAL PHARMACY W/ HCPCS (ALT 636 FOR OP/ED): Performed by: NURSE ANESTHETIST, CERTIFIED REGISTERED

## 2025-03-18 PROCEDURE — 43239 EGD BIOPSY SINGLE/MULTIPLE: CPT | Performed by: STUDENT IN AN ORGANIZED HEALTH CARE EDUCATION/TRAINING PROGRAM

## 2025-03-18 PROCEDURE — 7100000009 HC PHASE TWO TIME - INITIAL BASE CHARGE

## 2025-03-18 PROCEDURE — 7100000010 HC PHASE TWO TIME - EACH INCREMENTAL 1 MINUTE

## 2025-03-18 RX ORDER — MIDAZOLAM HYDROCHLORIDE 1 MG/ML
INJECTION INTRAMUSCULAR; INTRAVENOUS AS NEEDED
Status: DISCONTINUED | OUTPATIENT
Start: 2025-03-18 | End: 2025-03-18

## 2025-03-18 RX ORDER — PROPOFOL 10 MG/ML
INJECTION, EMULSION INTRAVENOUS AS NEEDED
Status: DISCONTINUED | OUTPATIENT
Start: 2025-03-18 | End: 2025-03-18

## 2025-03-18 RX ORDER — ONDANSETRON HYDROCHLORIDE 2 MG/ML
INJECTION, SOLUTION INTRAVENOUS AS NEEDED
Status: DISCONTINUED | OUTPATIENT
Start: 2025-03-18 | End: 2025-03-18

## 2025-03-18 RX ORDER — LIDOCAINE HYDROCHLORIDE 20 MG/ML
INJECTION, SOLUTION INFILTRATION; PERINEURAL AS NEEDED
Status: DISCONTINUED | OUTPATIENT
Start: 2025-03-18 | End: 2025-03-18

## 2025-03-18 RX ADMIN — LIDOCAINE HYDROCHLORIDE 100 MG: 20 INJECTION, SOLUTION INFILTRATION; PERINEURAL at 10:42

## 2025-03-18 RX ADMIN — PROPOFOL 20 MG: 10 INJECTION, EMULSION INTRAVENOUS at 10:52

## 2025-03-18 RX ADMIN — PROPOFOL 20 MG: 10 INJECTION, EMULSION INTRAVENOUS at 10:44

## 2025-03-18 RX ADMIN — PROPOFOL 20 MG: 10 INJECTION, EMULSION INTRAVENOUS at 10:55

## 2025-03-18 RX ADMIN — PROPOFOL 20 MG: 10 INJECTION, EMULSION INTRAVENOUS at 10:51

## 2025-03-18 RX ADMIN — PROPOFOL 20 MG: 10 INJECTION, EMULSION INTRAVENOUS at 10:54

## 2025-03-18 RX ADMIN — PROPOFOL 20 MG: 10 INJECTION, EMULSION INTRAVENOUS at 10:53

## 2025-03-18 RX ADMIN — MIDAZOLAM HYDROCHLORIDE 2 MG: 1 INJECTION, SOLUTION INTRAMUSCULAR; INTRAVENOUS at 10:41

## 2025-03-18 RX ADMIN — PROPOFOL 20 MG: 10 INJECTION, EMULSION INTRAVENOUS at 10:57

## 2025-03-18 RX ADMIN — PROPOFOL 20 MG: 10 INJECTION, EMULSION INTRAVENOUS at 10:45

## 2025-03-18 RX ADMIN — PROPOFOL 20 MG: 10 INJECTION, EMULSION INTRAVENOUS at 10:49

## 2025-03-18 RX ADMIN — PROPOFOL 20 MG: 10 INJECTION, EMULSION INTRAVENOUS at 10:56

## 2025-03-18 RX ADMIN — PROPOFOL 20 MG: 10 INJECTION, EMULSION INTRAVENOUS at 10:47

## 2025-03-18 RX ADMIN — PROPOFOL 100 MG: 10 INJECTION, EMULSION INTRAVENOUS at 10:42

## 2025-03-18 RX ADMIN — SODIUM CHLORIDE: 9 INJECTION, SOLUTION INTRAVENOUS at 10:35

## 2025-03-18 RX ADMIN — PROPOFOL 20 MG: 10 INJECTION, EMULSION INTRAVENOUS at 10:58

## 2025-03-18 RX ADMIN — PROPOFOL 20 MG: 10 INJECTION, EMULSION INTRAVENOUS at 10:50

## 2025-03-18 RX ADMIN — PROPOFOL 20 MG: 10 INJECTION, EMULSION INTRAVENOUS at 10:59

## 2025-03-18 RX ADMIN — PROPOFOL 20 MG: 10 INJECTION, EMULSION INTRAVENOUS at 10:48

## 2025-03-18 RX ADMIN — ONDANSETRON 4 MG: 2 INJECTION INTRAMUSCULAR; INTRAVENOUS at 10:42

## 2025-03-18 SDOH — HEALTH STABILITY: MENTAL HEALTH: CURRENT SMOKER: 0

## 2025-03-18 ASSESSMENT — PAIN SCALES - GENERAL
PAINLEVEL_OUTOF10: 8
PAINLEVEL_OUTOF10: 8
PAINLEVEL_OUTOF10: 5 - MODERATE PAIN
PAINLEVEL_OUTOF10: 8

## 2025-03-18 ASSESSMENT — ENCOUNTER SYMPTOMS
DIFFICULTY URINATING: 0
SPEECH DIFFICULTY: 0
COUGH: 0
DIARRHEA: 0
ARTHRALGIAS: 0
HEADACHES: 0
NAUSEA: 0
ABDOMINAL DISTENTION: 0
COLOR CHANGE: 0
BLOOD IN STOOL: 0
SHORTNESS OF BREATH: 0
VOMITING: 0
TROUBLE SWALLOWING: 0
SLEEP DISTURBANCE: 0
UNEXPECTED WEIGHT CHANGE: 0
CHILLS: 0
CONFUSION: 0
WHEEZING: 0
JOINT SWELLING: 0
CONSTIPATION: 0
DIZZINESS: 0
LIGHT-HEADEDNESS: 0
ABDOMINAL PAIN: 0
FEVER: 0

## 2025-03-18 ASSESSMENT — PAIN - FUNCTIONAL ASSESSMENT
PAIN_FUNCTIONAL_ASSESSMENT: 0-10

## 2025-03-18 NOTE — DISCHARGE INSTRUCTIONS
During the first 24 hours after your procedure, you should:     - Resume normal diet, unless otherwise directed by your doctor.  - Resume your home medications, unless otherwise directed by your doctor.  - Refrain from driving or operative heavy machinery.  - Drink plenty of liquids.  - Avoid consuming alcohol.  - Avoid strenuous activity or heavy lifting.     After 24 hours, you can resume regular activity.     Call your doctor office immediately ([phone]) or come to the nearest emergency room if you experience:     - Abdominal tenderness  - Blood in your stool or vomit  - Difficulty urinating or passing stools  - Difficulty breathing  - Chest pain  - Fever

## 2025-03-18 NOTE — ANESTHESIA PREPROCEDURE EVALUATION
Patient: Maureen See    Procedure Information       Date/Time: 03/18/25 1100    Scheduled providers: Fadumo Valencia MD; Malik Escobedo MD; Annie Angel RN    Procedure: EGD    Location: [facility]            Relevant Problems   Anesthesia (within normal limits)      Cardiac   (+) Familial hypercholesterolemia      Pulmonary (within normal limits)      Neuro   (+) Anxiety      GI   (+) Acid reflux   (+) Ulcerative colitis   (+) Ulcerative colitis without complications (Multi)      /Renal (within normal limits)      Liver (within normal limits)      Endocrine (within normal limits)      Hematology   (+) Iron deficiency anemia      Musculoskeletal (within normal limits)      HEENT (within normal limits)      ID (within normal limits)      Skin  psoriasis      GYN (within normal limits)       Clinical information reviewed:   Tobacco  Allergies  Meds   Med Hx  Surg Hx  OB Status  Fam Hx  Soc   Hx        NPO Detail:  NPO/Void Status  Date of Last Liquid: 03/18/25  Time of Last Liquid: 0630  Date of Last Solid: 03/17/25  Time of Last Solid: 1900  Last Intake Type: Clear fluids         Physical Exam    Airway  Mallampati: I  TM distance: >3 FB  Neck ROM: full     Cardiovascular - normal exam     Dental - normal exam     Pulmonary - normal exam     Abdominal - normal exam         Anesthesia Plan    History of general anesthesia?: yes  History of complications of general anesthesia?: no    ASA 3     general     The patient is not a current smoker.    intravenous induction   Anesthetic plan and risks discussed with patient.  Use of blood products discussed with patient who consented to blood products.

## 2025-03-18 NOTE — ANESTHESIA POSTPROCEDURE EVALUATION
Patient: Maureen See    Procedure Summary       Date: 03/18/25 Room / Location: [facility]    Anesthesia Start: 1034 Anesthesia Stop: 1105    Procedure: EGD Diagnosis:       Gastroesophageal reflux disease without esophagitis      Epigastric pain      Nausea    Scheduled Providers: Fadumo Valencia MD; Malik Escobedo MD; Annie Angel RN Responsible Provider: Malik Escobedo MD    Anesthesia Type: general ASA Status: 3            Anesthesia Type: general    Vitals Value Taken Time   /70 03/18/25 1145   Temp 36.3 °C (97.3 °F) 03/18/25 1103   Pulse 78 03/18/25 1145   Resp 18 03/18/25 1145   SpO2 98 % 03/18/25 1103       Anesthesia Post Evaluation    Patient location during evaluation: bedside  Patient participation: complete - patient participated  Level of consciousness: awake and alert  Pain management: adequate  Airway patency: patent  Cardiovascular status: acceptable  Respiratory status: acceptable  Hydration status: acceptable  Postoperative Nausea and Vomiting: none    No notable events documented.

## 2025-03-18 NOTE — H&P
History Of Present Illness  Maureen See is a 54 y.o. female presenting with Egd for abdominal pain and nausea.      Past Medical History  Past Medical History:   Diagnosis Date    Anemia     Anxiety     Asthma     Depression     Familial hypercholesterolemia     Fatty liver     GERD (gastroesophageal reflux disease)     Ileostomy in place (Multi) 2023    Inflammatory bowel disease     Iron deficiency anemia     Obesity     Prediabetes     2023 A1C 5.5%    Ulcerative colitis     Vitamin D deficiency      Surgical History  Past Surgical History:   Procedure Laterality Date     SECTION, LOW TRANSVERSE  2000    10/14/1992 & 2000    COLONOSCOPY      ESOPHAGOGASTRODUODENOSCOPY      FLEXIBLE SIGMOIDOSCOPY  2024    TOTAL COLECTOMY  2023 OPERATION/PROCEDURE: Single-incision laparoscopic total abdominal colectomy with end ileostomy    TOTAL PROCTECTOMY  2024    WISDOM TOOTH EXTRACTION       Social History  She reports that she quit smoking about 9 years ago. Her smoking use included cigarettes. She started smoking about 40 years ago. She has a 15.7 pack-year smoking history. She has never used smokeless tobacco. She reports that she does not currently use alcohol after a past usage of about 1.0 standard drink of alcohol per week. She reports that she does not use drugs.    Family History  Family History   Problem Relation Name Age of Onset    Arthritis Mother Pam Nacht     Hypertension Mother Pam Tiffaniet     Heart disease Father Edlorrie Nacht     Hypertension Father Edlorrie Nacht     Colon polyps Father Edlorrie Nacht     Cancer Maternal Grandmother Morena cid     Diabetes Maternal Grandmother Morena cid     Colon cancer Maternal Grandmother Morena cid     Cancer Paternal Grandfather Tyrell tiffaniet     Cancer Father's Brother Gianfranco Nacht     Diabetes Son Adam Noar     Diabetes Brother Raleigh Nacht     Hypertension Brother Raleigh Nacht         Allergies  Allergies    Allergen Reactions    Amoxicillin Hives, Rash and Unknown    Aspirin GI Upset, Nausea/vomiting and Unknown    Azithromycin Nausea/vomiting and Rash    Clindamycin Rash and Unknown    Sulfamethoxazole Hives     Review of Systems   Constitutional:  Negative for chills, fever and unexpected weight change.   HENT:  Negative for congestion and trouble swallowing.    Respiratory:  Negative for cough, shortness of breath and wheezing.    Cardiovascular:  Negative for chest pain.   Gastrointestinal:  Negative for abdominal distention, abdominal pain, blood in stool, constipation, diarrhea, nausea and vomiting.   Genitourinary:  Negative for difficulty urinating.   Musculoskeletal:  Negative for arthralgias and joint swelling.   Skin:  Negative for color change.   Neurological:  Negative for dizziness, speech difficulty, light-headedness and headaches.   Psychiatric/Behavioral:  Negative for confusion and sleep disturbance.         Physical Exam  Constitutional:       General: She is awake.      Appearance: Normal appearance.   HENT:      Head: Normocephalic and atraumatic.      Nose: Nose normal.      Mouth/Throat:      Mouth: Mucous membranes are moist.   Eyes:      Pupils: Pupils are equal, round, and reactive to light.   Neck:      Thyroid: No thyroid mass.      Trachea: Phonation normal.   Cardiovascular:      Rate and Rhythm: Normal rate and regular rhythm.   Pulmonary:      Effort: Pulmonary effort is normal. No respiratory distress.      Breath sounds: Normal air entry. No decreased breath sounds, wheezing, rhonchi or rales.   Abdominal:      General: Bowel sounds are normal. There is no distension.      Palpations: Abdomen is soft.      Tenderness: There is no abdominal tenderness.   Musculoskeletal:      Cervical back: Neck supple.      Right lower leg: No edema.      Left lower leg: No edema.   Skin:     General: Skin is warm.      Capillary Refill: Capillary refill takes less than 2 seconds.   Neurological:      " General: No focal deficit present.      Mental Status: She is alert and oriented to person, place, and time. Mental status is at baseline.      Cranial Nerves: Cranial nerves 2-12 are intact.      Motor: Motor function is intact.   Psychiatric:         Attention and Perception: Attention and perception normal.         Mood and Affect: Mood normal.         Speech: Speech normal.         Behavior: Behavior normal.          Last Recorded Vitals  Blood pressure 151/84, pulse 72, temperature 36.3 °C (97.3 °F), temperature source Temporal, resp. rate 16, height 1.549 m (5' 1\"), weight 79.5 kg (175 lb 4.3 oz), SpO2 100%.    Assessment/Plan   Egd for abdominal pain and nausea.      Fadumo Valencia MD  "

## 2025-03-20 DIAGNOSIS — K29.00 ACUTE GASTRITIS WITHOUT HEMORRHAGE, UNSPECIFIED GASTRITIS TYPE: Primary | ICD-10-CM

## 2025-03-20 RX ORDER — SUCRALFATE 1 G/1
1 TABLET ORAL 4 TIMES DAILY
Qty: 120 TABLET | Refills: 11 | Status: SHIPPED | OUTPATIENT
Start: 2025-03-20 | End: 2026-03-20

## 2025-03-21 ENCOUNTER — TELEPHONE (OUTPATIENT)
Facility: CLINIC | Age: 55
End: 2025-03-21
Payer: COMMERCIAL

## 2025-03-21 NOTE — TELEPHONE ENCOUNTER
I tried reaching the patient to reschedule her 4/24 appointment to 4/2. Pt did not answer I left a vm and a ED01t message.

## 2025-04-04 ENCOUNTER — APPOINTMENT (OUTPATIENT)
Dept: PRIMARY CARE | Facility: CLINIC | Age: 55
End: 2025-04-04
Payer: COMMERCIAL

## 2025-04-04 ASSESSMENT — PROMIS GLOBAL HEALTH SCALE
RATE_AVERAGE_FATIGUE: SEVERE
CARRYOUT_PHYSICAL_ACTIVITIES: MODERATELY
RATE_PHYSICAL_HEALTH: FAIR
EMOTIONAL_PROBLEMS: SOMETIMES
RATE_AVERAGE_PAIN: 8
RATE_PHYSICAL_HEALTH: FAIR
CARRYOUT_SOCIAL_ACTIVITIES: FAIR
RATE_SOCIAL_SATISFACTION: GOOD
CARRYOUT_SOCIAL_ACTIVITIES: FAIR
RATE_GENERAL_HEALTH: FAIR
RATE_AVERAGE_FATIGUE: SEVERE
RATE_MENTAL_HEALTH: FAIR
RATE_GENERAL_HEALTH: FAIR
RATE_QUALITY_OF_LIFE: FAIR
RATE_SOCIAL_SATISFACTION: GOOD
EMOTIONAL_PROBLEMS: SOMETIMES
RATE_AVERAGE_PAIN: 8
RATE_QUALITY_OF_LIFE: FAIR
RATE_MENTAL_HEALTH: FAIR
CARRYOUT_PHYSICAL_ACTIVITIES: MODERATELY

## 2025-04-04 ASSESSMENT — DERMATOLOGY QUALITY OF LIFE (QOL) ASSESSMENT
RATE HOW BOTHERED YOU ARE BY EFFECTS OF YOUR SKIN PROBLEMS ON YOUR ACTIVITIES (EG, GOING OUT, ACCOMPLISHING WHAT YOU WANT, WORK ACTIVITIES OR YOUR RELATIONSHIPS WITH OTHERS): 6 - ALWAYS BOTHERED
RATE HOW EMOTIONALLY BOTHERED YOU ARE BY YOUR SKIN PROBLEM (FOR EXAMPLE, WORRY, EMBARRASSMENT, FRUSTRATION): 6 - ALWAYS BOTHERED
DATE THE QUALITY-OF-LIFE ASSESSMENT WAS COMPLETED: 67299
RATE HOW BOTHERED YOU ARE BY SYMPTOMS OF YOUR SKIN PROBLEM (EG, ITCHING, STINGING BURNING, HURTING OR SKIN IRRITATION): 6 - ALWAYS BOTHERED
DATE THE QUALITY-OF-LIFE ASSESSMENT WAS COMPLETED: 67299
RATE HOW EMOTIONALLY BOTHERED YOU ARE BY YOUR SKIN PROBLEM (FOR EXAMPLE, WORRY, EMBARRASSMENT, FRUSTRATION): 6 - ALWAYS BOTHERED
WHAT SINGLE SKIN CONDITION LISTED BELOW IS THE PATIENT ANSWERING THE QUALITY-OF-LIFE ASSESSMENT QUESTIONS ABOUT: PSORIASIS
RATE HOW EMOTIONALLY BOTHERED YOU ARE BY YOUR SKIN PROBLEM (FOR EXAMPLE, WORRY, EMBARRASSMENT, FRUSTRATION): 6 - ALWAYS BOTHERED
WHAT SINGLE SKIN CONDITION LISTED BELOW IS THE PATIENT ANSWERING THE QUALITY-OF-LIFE ASSESSMENT QUESTIONS ABOUT: PSORIASIS
RATE HOW BOTHERED YOU ARE BY SYMPTOMS OF YOUR SKIN PROBLEM (EG, ITCHING, STINGING BURNING, HURTING OR SKIN IRRITATION): 6 - ALWAYS BOTHERED
RATE HOW BOTHERED YOU ARE BY SYMPTOMS OF YOUR SKIN PROBLEM (EG, ITCHING, STINGING BURNING, HURTING OR SKIN IRRITATION): 6 - ALWAYS BOTHERED
DATE THE QUALITY-OF-LIFE ASSESSMENT WAS COMPLETED: 67299
WHAT SINGLE SKIN CONDITION LISTED BELOW IS THE PATIENT ANSWERING THE QUALITY-OF-LIFE ASSESSMENT QUESTIONS ABOUT: PSORIASIS

## 2025-04-07 ENCOUNTER — APPOINTMENT (OUTPATIENT)
Dept: RHEUMATOLOGY | Facility: CLINIC | Age: 55
End: 2025-04-07
Payer: COMMERCIAL

## 2025-04-09 ENCOUNTER — HOSPITAL ENCOUNTER (EMERGENCY)
Facility: HOSPITAL | Age: 55
Discharge: HOME | End: 2025-04-10
Attending: EMERGENCY MEDICINE
Payer: COMMERCIAL

## 2025-04-09 ENCOUNTER — APPOINTMENT (OUTPATIENT)
Dept: CARDIOLOGY | Facility: HOSPITAL | Age: 55
End: 2025-04-09
Payer: COMMERCIAL

## 2025-04-09 DIAGNOSIS — J10.1 INFLUENZA A: Primary | ICD-10-CM

## 2025-04-09 PROCEDURE — 80053 COMPREHEN METABOLIC PANEL: CPT | Performed by: EMERGENCY MEDICINE

## 2025-04-09 PROCEDURE — 83605 ASSAY OF LACTIC ACID: CPT | Performed by: EMERGENCY MEDICINE

## 2025-04-09 PROCEDURE — 85025 COMPLETE CBC W/AUTO DIFF WBC: CPT | Performed by: EMERGENCY MEDICINE

## 2025-04-09 PROCEDURE — 87637 SARSCOV2&INF A&B&RSV AMP PRB: CPT | Performed by: EMERGENCY MEDICINE

## 2025-04-09 PROCEDURE — 36415 COLL VENOUS BLD VENIPUNCTURE: CPT | Performed by: EMERGENCY MEDICINE

## 2025-04-09 PROCEDURE — 99285 EMERGENCY DEPT VISIT HI MDM: CPT | Performed by: EMERGENCY MEDICINE

## 2025-04-09 PROCEDURE — 93005 ELECTROCARDIOGRAM TRACING: CPT

## 2025-04-09 ASSESSMENT — PAIN - FUNCTIONAL ASSESSMENT: PAIN_FUNCTIONAL_ASSESSMENT: 0-10

## 2025-04-09 ASSESSMENT — PAIN SCALES - GENERAL: PAINLEVEL_OUTOF10: 10 - WORST POSSIBLE PAIN

## 2025-04-09 ASSESSMENT — PAIN DESCRIPTION - PAIN TYPE: TYPE: ACUTE PAIN

## 2025-04-10 ENCOUNTER — APPOINTMENT (OUTPATIENT)
Dept: DERMATOLOGY | Facility: CLINIC | Age: 55
End: 2025-04-10
Payer: COMMERCIAL

## 2025-04-10 ENCOUNTER — APPOINTMENT (OUTPATIENT)
Dept: RADIOLOGY | Facility: HOSPITAL | Age: 55
End: 2025-04-10
Payer: COMMERCIAL

## 2025-04-10 VITALS
WEIGHT: 180 LBS | RESPIRATION RATE: 15 BRPM | HEART RATE: 91 BPM | OXYGEN SATURATION: 97 % | HEIGHT: 61 IN | SYSTOLIC BLOOD PRESSURE: 131 MMHG | BODY MASS INDEX: 33.99 KG/M2 | DIASTOLIC BLOOD PRESSURE: 80 MMHG | TEMPERATURE: 101.9 F

## 2025-04-10 LAB
ALBUMIN SERPL BCP-MCNC: 4.3 G/DL (ref 3.4–5)
ALP SERPL-CCNC: 107 U/L (ref 33–110)
ALT SERPL W P-5'-P-CCNC: 35 U/L (ref 7–45)
ANION GAP SERPL CALC-SCNC: 12 MMOL/L (ref 10–20)
APPEARANCE UR: CLEAR
AST SERPL W P-5'-P-CCNC: 55 U/L (ref 9–39)
ATRIAL RATE: 103 BPM
BASOPHILS # BLD AUTO: 0.03 X10*3/UL (ref 0–0.1)
BASOPHILS NFR BLD AUTO: 0.5 %
BILIRUB SERPL-MCNC: 0.5 MG/DL (ref 0–1.2)
BILIRUB UR STRIP.AUTO-MCNC: NEGATIVE MG/DL
BUN SERPL-MCNC: 9 MG/DL (ref 6–23)
CALCIUM SERPL-MCNC: 9 MG/DL (ref 8.6–10.3)
CHLORIDE SERPL-SCNC: 105 MMOL/L (ref 98–107)
CO2 SERPL-SCNC: 25 MMOL/L (ref 21–32)
COLOR UR: ABNORMAL
CREAT SERPL-MCNC: 0.79 MG/DL (ref 0.5–1.05)
EGFRCR SERPLBLD CKD-EPI 2021: 90 ML/MIN/1.73M*2
EOSINOPHIL # BLD AUTO: 0.09 X10*3/UL (ref 0–0.7)
EOSINOPHIL NFR BLD AUTO: 1.4 %
ERYTHROCYTE [DISTWIDTH] IN BLOOD BY AUTOMATED COUNT: 14.9 % (ref 11.5–14.5)
FLUAV RNA RESP QL NAA+PROBE: DETECTED
FLUBV RNA RESP QL NAA+PROBE: NOT DETECTED
GLUCOSE SERPL-MCNC: 120 MG/DL (ref 74–99)
GLUCOSE UR STRIP.AUTO-MCNC: NORMAL MG/DL
HCT VFR BLD AUTO: 35.9 % (ref 36–46)
HGB BLD-MCNC: 11.9 G/DL (ref 12–16)
IMM GRANULOCYTES # BLD AUTO: 0.16 X10*3/UL (ref 0–0.7)
IMM GRANULOCYTES NFR BLD AUTO: 2.6 % (ref 0–0.9)
KETONES UR STRIP.AUTO-MCNC: NEGATIVE MG/DL
LACTATE SERPL-SCNC: 1.7 MMOL/L (ref 0.4–2)
LEUKOCYTE ESTERASE UR QL STRIP.AUTO: ABNORMAL
LYMPHOCYTES # BLD AUTO: 0.61 X10*3/UL (ref 1.2–4.8)
LYMPHOCYTES NFR BLD AUTO: 9.8 %
MCH RBC QN AUTO: 28.3 PG (ref 26–34)
MCHC RBC AUTO-ENTMCNC: 33.1 G/DL (ref 32–36)
MCV RBC AUTO: 86 FL (ref 80–100)
MONOCYTES # BLD AUTO: 0.49 X10*3/UL (ref 0.1–1)
MONOCYTES NFR BLD AUTO: 7.8 %
MUCOUS THREADS #/AREA URNS AUTO: ABNORMAL /LPF
NEUTROPHILS # BLD AUTO: 4.87 X10*3/UL (ref 1.2–7.7)
NEUTROPHILS NFR BLD AUTO: 77.9 %
NITRITE UR QL STRIP.AUTO: NEGATIVE
NRBC BLD-RTO: 0 /100 WBCS (ref 0–0)
P AXIS: 51 DEGREES
P OFFSET: 194 MS
P ONSET: 156 MS
PH UR STRIP.AUTO: 6 [PH]
PLATELET # BLD AUTO: 230 X10*3/UL (ref 150–450)
POTASSIUM SERPL-SCNC: 5.5 MMOL/L (ref 3.5–5.3)
PR INTERVAL: 132 MS
PROT SERPL-MCNC: 7.6 G/DL (ref 6.4–8.2)
PROT UR STRIP.AUTO-MCNC: NEGATIVE MG/DL
Q ONSET: 222 MS
QRS COUNT: 17 BEATS
QRS DURATION: 74 MS
QT INTERVAL: 348 MS
QTC CALCULATION(BAZETT): 455 MS
QTC FREDERICIA: 417 MS
R AXIS: 41 DEGREES
RBC # BLD AUTO: 4.2 X10*6/UL (ref 4–5.2)
RBC # UR STRIP.AUTO: ABNORMAL MG/DL
RBC #/AREA URNS AUTO: ABNORMAL /HPF
RSV RNA RESP QL NAA+PROBE: NOT DETECTED
SARS-COV-2 RNA RESP QL NAA+PROBE: NOT DETECTED
SODIUM SERPL-SCNC: 136 MMOL/L (ref 136–145)
SP GR UR STRIP.AUTO: 1.01
SQUAMOUS #/AREA URNS AUTO: ABNORMAL /HPF
T AXIS: 56 DEGREES
T OFFSET: 396 MS
UROBILINOGEN UR STRIP.AUTO-MCNC: NORMAL MG/DL
VENTRICULAR RATE: 103 BPM
WBC # BLD AUTO: 6.3 X10*3/UL (ref 4.4–11.3)
WBC #/AREA URNS AUTO: ABNORMAL /HPF

## 2025-04-10 PROCEDURE — 2500000004 HC RX 250 GENERAL PHARMACY W/ HCPCS (ALT 636 FOR OP/ED): Performed by: EMERGENCY MEDICINE

## 2025-04-10 PROCEDURE — 71045 X-RAY EXAM CHEST 1 VIEW: CPT | Mod: FOREIGN READ | Performed by: RADIOLOGY

## 2025-04-10 PROCEDURE — 87086 URINE CULTURE/COLONY COUNT: CPT | Mod: AHULAB | Performed by: EMERGENCY MEDICINE

## 2025-04-10 PROCEDURE — 81001 URINALYSIS AUTO W/SCOPE: CPT | Performed by: EMERGENCY MEDICINE

## 2025-04-10 PROCEDURE — 96360 HYDRATION IV INFUSION INIT: CPT

## 2025-04-10 PROCEDURE — 71045 X-RAY EXAM CHEST 1 VIEW: CPT

## 2025-04-10 PROCEDURE — 2500000001 HC RX 250 WO HCPCS SELF ADMINISTERED DRUGS (ALT 637 FOR MEDICARE OP): Performed by: EMERGENCY MEDICINE

## 2025-04-10 RX ORDER — ACETAMINOPHEN 325 MG/1
650 TABLET ORAL ONCE
Status: COMPLETED | OUTPATIENT
Start: 2025-04-10 | End: 2025-04-10

## 2025-04-10 RX ADMIN — ACETAMINOPHEN 650 MG: 325 TABLET, FILM COATED ORAL at 00:48

## 2025-04-10 RX ADMIN — SODIUM CHLORIDE 1000 ML: 0.9 INJECTION, SOLUTION INTRAVENOUS at 00:48

## 2025-04-10 ASSESSMENT — DERMATOLOGY QUALITY OF LIFE (QOL) ASSESSMENT
WHAT SINGLE SKIN CONDITION LISTED BELOW IS THE PATIENT ANSWERING THE QUALITY-OF-LIFE ASSESSMENT QUESTIONS ABOUT: PSORIASIS
RATE HOW BOTHERED YOU ARE BY SYMPTOMS OF YOUR SKIN PROBLEM (EG, ITCHING, STINGING BURNING, HURTING OR SKIN IRRITATION): 6 - ALWAYS BOTHERED
RATE HOW BOTHERED YOU ARE BY EFFECTS OF YOUR SKIN PROBLEMS ON YOUR ACTIVITIES (EG, GOING OUT, ACCOMPLISHING WHAT YOU WANT, WORK ACTIVITIES OR YOUR RELATIONSHIPS WITH OTHERS): 6 - ALWAYS BOTHERED
RATE HOW EMOTIONALLY BOTHERED YOU ARE BY YOUR SKIN PROBLEM (FOR EXAMPLE, WORRY, EMBARRASSMENT, FRUSTRATION): 6 - ALWAYS BOTHERED

## 2025-04-10 ASSESSMENT — PAIN SCALES - GENERAL: PAINLEVEL_OUTOF10: 0 - NO PAIN

## 2025-04-10 NOTE — PROGRESS NOTES
Emergency Medicine Transition of Care Note.    I received Maureen See in signout from Dr. Gutierrez.  Please see the previous ED provider note for all HPI, PE and MDM up to the time of signout. This is in addition to the primary record.    In brief Maureen See is an 54 y.o. female presenting for   Chief Complaint   Patient presents with    Fever    Shortness of Breath    Cough    Headache    Flank Pain     At the time of signout we were awaiting: UA    Diagnoses as of 04/10/25 0416   Influenza A       Medical Decision Making  No UTI.  I did offer the patient Tamiflu but she was worried about it symptoms.  She already has Zofran at home.  She sees a GI doctor in a week.  I did tell her to avoid interacting with people and self-quarantine for at least 1 week until her fever has resolved at least 24 hours.    Final diagnoses:   [J10.1] Influenza A           Procedure  Procedures    Varinder Rocha MD

## 2025-04-10 NOTE — ED PROVIDER NOTES
HPI   Chief Complaint   Patient presents with    Fever    Shortness of Breath    Cough    Headache    Flank Pain       This is a 54-year-old female who presents to the emergency department complaining of fever, cough, shortness of breath, headache and kidney pain.  Patient states that the symptoms started yesterday and were worse today.  She reports decreased urination.  Her cough is nonproductive.  Her shortness of breath is worse with exertion.    Past medical history: Ulcerative colitis, ileostomy, anxiety, anemia, GERD, prediabetes              Patient History   Past Medical History:   Diagnosis Date    Anemia     Anxiety     Asthma     Depression     Familial hypercholesterolemia     Fatty liver     GERD (gastroesophageal reflux disease)     Ileostomy in place (Multi) 2023    Inflammatory bowel disease     Iron deficiency anemia     Obesity     Prediabetes     2023 A1C 5.5%    Ulcerative colitis     Vitamin D deficiency      Past Surgical History:   Procedure Laterality Date     SECTION, LOW TRANSVERSE  2000    10/14/1992 & 2000    COLONOSCOPY      ESOPHAGOGASTRODUODENOSCOPY      FLEXIBLE SIGMOIDOSCOPY  2024    TOTAL COLECTOMY  2023 OPERATION/PROCEDURE: Single-incision laparoscopic total abdominal colectomy with end ileostomy    TOTAL PROCTECTOMY  2024    WISDOM TOOTH EXTRACTION       Family History   Problem Relation Name Age of Onset    Arthritis Mother Pam Nacht     Hypertension Mother Pam Nacht     Heart disease Father Edward Nacht     Hypertension Father Edward Nacht     Colon polyps Father Edward Nacht     Cancer Maternal Grandmother Morena palak     Diabetes Maternal Grandmother Morena palak     Colon cancer Maternal Grandmother Morena palak     Cancer Paternal Grandfather Max nacht     Cancer Father's Brother Gianfranco Nacht     Diabetes Son Adam Noar     Diabetes Brother Raleigh Tiffaniet     Hypertension Brother Raleigh Tiffaniet      Social History      Tobacco Use    Smoking status: Former     Current packs/day: 0.00     Average packs/day: 0.5 packs/day for 31.4 years (15.7 ttl pk-yrs)     Types: Cigarettes     Start date: 1984     Quit date: 2015     Years since quittin.6    Smokeless tobacco: Never   Vaping Use    Vaping status: Never Used   Substance Use Topics    Alcohol use: Not Currently     Alcohol/week: 1.0 standard drink of alcohol     Types: 1 Glasses of wine per week    Drug use: Never       Physical Exam   ED Triage Vitals [25 2236]   Temperature Heart Rate Respirations BP   (!) 38.8 °C (101.9 °F) (!) 107 18 125/88      Pulse Ox Temp Source Heart Rate Source Patient Position   97 % Oral Monitor Sitting      BP Location FiO2 (%)     Left arm --       Physical Exam  Vitals and nursing note reviewed.   HENT:      Head: Normocephalic and atraumatic.      Nose: Nose normal.   Eyes:      Conjunctiva/sclera: Conjunctivae normal.   Cardiovascular:      Rate and Rhythm: Normal rate and regular rhythm.      Pulses: Normal pulses.      Heart sounds: Normal heart sounds.   Pulmonary:      Effort: Pulmonary effort is normal.      Breath sounds: Normal breath sounds.   Abdominal:      General: Bowel sounds are normal.      Palpations: Abdomen is soft.      Comments: Right lower quadrant ostomy   Musculoskeletal:         General: Normal range of motion.      Cervical back: Normal range of motion and neck supple.   Skin:     Findings: No rash.   Neurological:      General: No focal deficit present.      Mental Status: She is alert and oriented to person, place, and time.   Psychiatric:         Mood and Affect: Mood normal.           ED Course & MDM   Diagnoses as of 04/10/25 0157   Influenza A                 No data recorded     [place] Coma Scale Score: 15 (25 2239 : Danyel Ortega RN)                           Medical Decision Making  Differential diagnoses considered: Sepsis, pneumonia, cellulitis, UTI, diverticulitis, colitis,  COVID, influenza, RSV, other viral illnesses, bacteremia    This is a 54-year-old female who presents emergency department complaining of fever, cough, shortness of breath, headache and kidney pain.  The patient be treated with Tylenol and IV fluids.  She will be further evaluated with viral swabs, chest x-ray, urinalysis labs.  Her white blood count is normal at 6.3.  Lactate normal at 1.7.  This is not consistent with sepsis.  Viral swabs are positive for influenza A.  The patient's potassium was hemolyzed but elevated at 5.5.  Repeat potassium and urinalysis are pending at time of signout.        Procedure  Procedures     Mohamud Gutierrez MD  04/10/25 0158

## 2025-04-10 NOTE — ED TRIAGE NOTES
Pt BIBPV from home with complaints of shortness of breath, cough, fever, headaches, kidneys are on fire and dehydration. Symptoms began last night. Pt states her ostomy output is brown liquid, so she thinks she is dehydrated. Pt took extra strength robitussin. Brought her fever down a little bit .

## 2025-04-11 ENCOUNTER — APPOINTMENT (OUTPATIENT)
Dept: PRIMARY CARE | Facility: CLINIC | Age: 55
End: 2025-04-11
Payer: COMMERCIAL

## 2025-04-11 LAB — BACTERIA UR CULT: NORMAL

## 2025-04-14 ENCOUNTER — APPOINTMENT (OUTPATIENT)
Dept: OBSTETRICS AND GYNECOLOGY | Facility: CLINIC | Age: 55
End: 2025-04-14
Payer: COMMERCIAL

## 2025-04-14 ENCOUNTER — APPOINTMENT (OUTPATIENT)
Dept: PRIMARY CARE | Facility: CLINIC | Age: 55
End: 2025-04-14
Payer: COMMERCIAL

## 2025-04-16 ENCOUNTER — OFFICE VISIT (OUTPATIENT)
Dept: GASTROENTEROLOGY | Facility: CLINIC | Age: 55
End: 2025-04-16
Payer: COMMERCIAL

## 2025-04-16 DIAGNOSIS — R05.1 ACUTE COUGH: Primary | ICD-10-CM

## 2025-04-16 DIAGNOSIS — Z30.09 BIRTH CONTROL COUNSELING: ICD-10-CM

## 2025-04-16 PROCEDURE — 99213 OFFICE O/P EST LOW 20 MIN: CPT | Performed by: NURSE PRACTITIONER

## 2025-04-16 PROCEDURE — 99213 OFFICE O/P EST LOW 20 MIN: CPT | Mod: 95 | Performed by: NURSE PRACTITIONER

## 2025-04-16 RX ORDER — NORETHINDRONE 0.35 MG/1
1 TABLET ORAL DAILY
Qty: 84 TABLET | Refills: 1 | Status: SHIPPED | OUTPATIENT
Start: 2025-04-16 | End: 2025-07-15

## 2025-04-16 RX ORDER — BENZONATATE 100 MG/1
100 CAPSULE ORAL 3 TIMES DAILY PRN
Qty: 42 CAPSULE | Refills: 1 | Status: SHIPPED | OUTPATIENT
Start: 2025-04-16 | End: 2025-04-30

## 2025-04-16 NOTE — PROGRESS NOTES
Subjective   Patient ID: Maureen See is a 54 y.o. female.    HPI  54-year-old female for follow-up visit for history of ulcerative colitis with total colectomy and end ileostomy  Positive for influenza A as of 4/9/2025  Patient requested TeleMed visit due to being ill and agreeable to TeleMed visit.  Greater than 50% of visit spent in direct coordination of care and education of patient.  Total time spent 20 minutes  Patient is physically located in [facility] in [place] at time of visit  Labs reviewed normal LFTs  H&H 11.9 and 35.9  EGD showed a 2 cm hiatal hernia, mild erythema in the antrum, 3 gastric fundic polyps, normal duodenum  Pathology was negative for H. pylori nonspecific gastritis  She reports with influenza A not having much of an appetite  She is drinking broth and Gatorade  Fever has resolved she still has residual cough  Her rectum feels like it is on fire she is still following with pain management and may possibly have a TENS unit placed  Ostomy is putting out loose brown stool  She is using Zofran with good effect      Objective   Physical Exam  Physical exam not performed, TeleMed visit per patient request  Assessment/Plan     Rectal pain please follow with pain management    History of ulcerative colitis with subtotal colectomy and end ileostomy    Influenza A with cough and lack of appetite, I recommend using liquid IV to help with hydration, I will give you a prescription for Tessalon Perles to help with the cough as well as Claritin-D    I will see you back for follow-up at [facility]

## 2025-04-16 NOTE — PATIENT INSTRUCTIONS
Rectal pain please follow with pain management    History of ulcerative colitis with subtotal colectomy and end ileostomy    Influenza A with cough and lack of appetite, I recommend using liquid IV to help with hydration, I will give you a prescription for Tessalon Perles to help with the cough as well as Claritin-D    I will see you back for follow-up at [facility]

## 2025-04-24 ENCOUNTER — APPOINTMENT (OUTPATIENT)
Dept: RHEUMATOLOGY | Facility: CLINIC | Age: 55
End: 2025-04-24
Payer: COMMERCIAL

## 2025-04-29 ENCOUNTER — APPOINTMENT (OUTPATIENT)
Dept: PAIN MEDICINE | Facility: HOSPITAL | Age: 55
End: 2025-04-29
Payer: COMMERCIAL

## 2025-04-29 ENCOUNTER — APPOINTMENT (OUTPATIENT)
Dept: OBSTETRICS AND GYNECOLOGY | Facility: CLINIC | Age: 55
End: 2025-04-29
Payer: COMMERCIAL

## 2025-05-01 ENCOUNTER — APPOINTMENT (OUTPATIENT)
Dept: OBSTETRICS AND GYNECOLOGY | Facility: CLINIC | Age: 55
End: 2025-05-01
Payer: COMMERCIAL

## 2025-05-06 ASSESSMENT — RHEUMATOLOGY NEW PATIENT QUESTIONNAIRE
BLACK STOOLS: N
SEIZURES: N
VAGINAL DRYNESS: N
CHEST PAIN: N
HEARTBURN OR REFLUX: Y
ANXIETY: Y
PAIN OR BURNING ON URINATION: N
UNUSUAL FATIGUE: Y
INCREASED SUSCEPTIBILITY TO INFECTION: N
EXCESSIVE HAIR LOSS (MORE THAN YOUR NORM): Y
ANEMIA: Y
SHORTNESS OF BREATH: N
NUMBNESS OR TINGLING IN HANDS OR FEET: N
DIFFICULTY FALLING ASLEEP: N
LOSS OF CONSCIOUSNESS: N
DEPRESSION: N
UNUSUALLY RAPID OR SLOWED HEART RATE: N
LOSS OF VISION: N
SWOLLEN OR TENDER GLANDS: N
NIGHT SWEATS: N
EASY BRUISING: N
RASH OR ULCERS: N
FEVER: N
JOINT PAIN: Y
EYE DRYNESS: N
HEADACHES: N
RASH: Y
MUSCLE WEAKNESS: Y
MORNING STIFFNESS: N
UNUSUAL BLEEDING: N
EASILY LOSING TEMPER: N
NAUSEA: N
AGITATION: N
VOMITING OF BLOOD OR COFFEE GROUND CONSISTENCY MATERIAL: N
EYE REDNESS: N
JOINT SWELLING: N
HOARSE VOICE: N
SUN SENSITIVE (SUN ALLERGY): N
FAINTING: N
DIFFICULTY STAYING ASLEEP: N
EYE PAIN: N
PERSISTENT DIARRHEA: N
SORES IN MOUTH OR NOSE: N
MEMORY LOSS: N
SKIN REDNESS: N
UNEXPLAINED WEIGHT CHANGE: N
DRYNESS OF MOUTH: N
UNEXPLAINED HEARING LOSS: N
BEHAVIORAL CHANGES: N
STOMACH PAIN: N
JAUNDICE: N
SKIN TIGHTNESS: N
DOUBLE OR BLURRED VISION: N
COUGH: N
SWOLLEN LEGS OR FEET: N
BLOOD IN STOOLS: N
COLOR CHANGES OF HANDS OR FEET IN THE COLD: N
ABNORMAL URINE: N
DIFFICULTY BREATHING LYING DOWN: N
DIFFICULTY SWALLOWING: N
NODULES/BUMPS: N

## 2025-05-07 ENCOUNTER — HOSPITAL ENCOUNTER (OUTPATIENT)
Dept: RADIOLOGY | Facility: CLINIC | Age: 55
Discharge: HOME | End: 2025-05-07
Payer: COMMERCIAL

## 2025-05-07 ENCOUNTER — APPOINTMENT (OUTPATIENT)
Facility: CLINIC | Age: 55
End: 2025-05-07
Payer: COMMERCIAL

## 2025-05-07 VITALS
SYSTOLIC BLOOD PRESSURE: 153 MMHG | WEIGHT: 174.6 LBS | TEMPERATURE: 97.9 F | HEIGHT: 61 IN | DIASTOLIC BLOOD PRESSURE: 93 MMHG | HEART RATE: 73 BPM | BODY MASS INDEX: 32.97 KG/M2

## 2025-05-07 DIAGNOSIS — L40.50 PSORIATIC ARTHRITIS (MULTI): ICD-10-CM

## 2025-05-07 DIAGNOSIS — K52.9 ARTHROPATHY ASSOCIATED WITH INFLAMMATORY BOWEL DISEASE: ICD-10-CM

## 2025-05-07 DIAGNOSIS — M12.9 ARTHROPATHY ASSOCIATED WITH INFLAMMATORY BOWEL DISEASE: ICD-10-CM

## 2025-05-07 DIAGNOSIS — Z79.899 HIGH RISK MEDICATION USE: ICD-10-CM

## 2025-05-07 DIAGNOSIS — K52.9 ARTHROPATHY ASSOCIATED WITH INFLAMMATORY BOWEL DISEASE: Primary | ICD-10-CM

## 2025-05-07 DIAGNOSIS — M19.90 INFLAMMATORY ARTHRITIS: ICD-10-CM

## 2025-05-07 DIAGNOSIS — M12.9 ARTHROPATHY ASSOCIATED WITH INFLAMMATORY BOWEL DISEASE: Primary | ICD-10-CM

## 2025-05-07 PROCEDURE — 73100 X-RAY EXAM OF WRIST: CPT | Mod: 50

## 2025-05-07 PROCEDURE — 3008F BODY MASS INDEX DOCD: CPT | Performed by: STUDENT IN AN ORGANIZED HEALTH CARE EDUCATION/TRAINING PROGRAM

## 2025-05-07 PROCEDURE — 72100 X-RAY EXAM L-S SPINE 2/3 VWS: CPT | Performed by: RADIOLOGY

## 2025-05-07 PROCEDURE — 1036F TOBACCO NON-USER: CPT | Performed by: STUDENT IN AN ORGANIZED HEALTH CARE EDUCATION/TRAINING PROGRAM

## 2025-05-07 PROCEDURE — 99205 OFFICE O/P NEW HI 60 MIN: CPT | Performed by: STUDENT IN AN ORGANIZED HEALTH CARE EDUCATION/TRAINING PROGRAM

## 2025-05-07 PROCEDURE — 72100 X-RAY EXAM L-S SPINE 2/3 VWS: CPT

## 2025-05-07 PROCEDURE — 73120 X-RAY EXAM OF HAND: CPT | Mod: 50

## 2025-05-07 NOTE — PROGRESS NOTES
"Rheumatology New Outpatient  Note    Subjective   Maureen See is a 54 y.o. female presenting today for Muscle Pain and Joint Pain.    History of Presenting Problem:   Maureen with PMHx of HLP, vitamin D def, h/o ulcerative colitis with subtotal colectomy and end ileostomy, Anxiety, fatigue and psoriasis, is presenting today as a NP for joint pain    She had h/o severe of UC in 2021 and failed Remicade, entyvio, Rinvoq and then underwent with subtotal colectomy and end ileostomy then also underwent rectal resection which was complicated by nerve damage and is on gabapentin and follows with pain management.     She had psoriasis since age 18 and its getting worse since her last surgery.   She has chronic pain in arms, legs,knees, back. Pain is worse after activities.   Her psoriasis and joint pain improved when she was on Remicade .  No eye inflammation    Currently she has pain all over her body. She has episodes of hand swelling.No morning stiffness but more pain in the morning.         Past Medical History: Medical History[1]    Allergies: Allergies[2]    Medications: Current Medications[3]    Review of Systems:   Constitutional: Denies fever, chills   Eyes: Denies dry eyes, pain in the eyes   ENT: Denies dry mouth, loss of taste, sores in the mouth  Cardiovascular: Denies chest pain, palpitations   Respiratory: Denies shortness of breath   Gastrointestinal: Denies heartburn   Integumentary: Denies photosensitivity, rash or lesions, Raynaud's   Neurological: Denies any numbness or tingling    MSK: As per HPI.     All 10 review of systems have been reviewed and are negative for complaint except as noted in the HPI    Objective   Physical Examination:  Vitals:    05/07/25 0912   BP: (!) 153/93   Pulse: 73   Temp: 36.6 °C (97.9 °F)     Growth [employer] SmartLinks can only be used for patients less than 20 years old.  Ht Readings from Last 1 Encounters:   05/07/25 1.549 m (5' 1\")     Wt Readings from Last 1 Encounters: "   05/07/25 79.2 kg (174 lb 9.6 oz)       General - NAD, sitting up in chair, well-groomed, pleasant, AAOx3  Head: Normocephalic, atraumatic  Eyes - PERRLA, EOMI. No conjunctiva injection.   Skin - Nail damage from pitting and biting. Multiple psoriatic lesions on arms and legs. No erythema on bilateral cheeks.  Extremities - No edema, cyanosis ,or clubbing  Neurological - Alert and oriented x 3,  grossly intact. No focal deficit.  Musculoskeletal -  Shoulders: Full ROM, without pain, no swelling, warmth or tenderness.  Elbows: Full ROM, without pain, no swelling, warmth or tenderness.  Wrists: bilateral wrists warmth or tenderness. No swelling  MCP: No swelling, warmth or tenderness. Metacarpal squeeze positive  PIP: No swelling, warmth or tenderness.  DIP: No swelling, warmth or tenderness.  Hands : 5/5.    Sacroiliac joints: couldn't assess patient in so much pain from rectal surgery  Hips: couldn't assess patient in so much pain from rectal surgery  Knees:  Full ROM, without pain, no swelling, warmth or point tenderness.   Ankles: Full ROM, without pain, swelling, warmth or tenderness.  Toes:  Metatarsal squeeze negative  Cervical spine: Diffuse  tenderness  Lumbar spine: Diffuse tenderness        Assessment/Plan   Maureen See is a 54 y.o. female with PMHx of HLP, vitamin D def, h/o ulcerative colitis with subtotal colectomy and end ileostomy, Anxiety, fatigue and psoriasis who is presenting today as a NP for joint pain    She has joint pain in the setting of severe complicated ulcerative colitis and psoriasis. She previously failed Remicade, Entyvio and Rinvoq. She is s/p colectomy and end ileostomy.  This is rising the concern for IBD related arthropathy vs psoriatic arthritis. She may have an element of fibromyalgia but inflammatory arthritis couldn't be ruled out. I will check serologies, x-rays (evaluate for axial involvement) and she will discuss with GI if she can be on subcutaneous MTX vs TNFi.           The assessment and plan, risk and benefits were discussed with the patient. All of the patients questions were answered and patient agrees to the plan.      Ernesto Peñaloza MD  Clinical   Department of Rheumatology   [facility]           [1]   Past Medical History:  Diagnosis Date    Anemia     Anxiety     Asthma     Depression     Familial hypercholesterolemia     Fatty liver     GERD (gastroesophageal reflux disease)     Ileostomy in place (Multi) 05/2023    Inflammatory bowel disease     Iron deficiency anemia     Obesity     Prediabetes     7/13/2023 A1C 5.5%    Ulcerative colitis     Vitamin D deficiency    [2]   Allergies  Allergen Reactions    Amoxicillin Hives, Rash and Unknown    Aspirin GI Upset, Nausea/vomiting and Unknown    Azithromycin Nausea/vomiting and Rash    Clindamycin Rash and Unknown    Sulfamethoxazole Hives   [3]   Current Outpatient Medications:     dicyclomine (Bentyl) 10 mg capsule, Take 1 capsule (10 mg) by mouth 4 times a day before meals. Take with meals and at bedtime, Disp: 360 capsule, Rfl: 3    escitalopram (Lexapro) 20 mg tablet, Take 1 tablet (20 mg) by mouth once daily at bedtime., Disp: , Rfl:     esomeprazole (NexIUM) 40 mg DR capsule, Take 1 capsule (40 mg) by mouth 2 times a day. Do not open capsule., Disp: 180 capsule, Rfl: 3    ferrous sulfate, 325 mg ferrous sulfate, (FeosoL) tablet, Take 1 tablet (325 mg) by mouth once daily with breakfast., Disp: 90 tablet, Rfl: 3    gabapentin (Neurontin) 600 mg tablet, Take 0.5 tablets (300 mg) by mouth 3 times a day., Disp: 135 tablet, Rfl: 1    norethindrone (Jencycla) 0.35 mg tablet, Take 1 tablet (0.35 mg) by mouth once daily., Disp: 84 tablet, Rfl: 1    rosuvastatin (Crestor) 20 mg tablet, TAKE 1 TABLET BY MOUTH EVERY DAY, Disp: 90 tablet, Rfl: 0    sucralfate (Carafate) 1 gram tablet, Take 1 tablet (1 g) by mouth 4 times a day. Take 1 hour before meals and at bedtime,  Disp: 120 tablet, Rfl: 11

## 2025-05-08 ENCOUNTER — TELEMEDICINE (OUTPATIENT)
Dept: PAIN MEDICINE | Facility: HOSPITAL | Age: 55
End: 2025-05-08
Payer: COMMERCIAL

## 2025-05-08 DIAGNOSIS — K62.89 RECTAL PAIN: Primary | ICD-10-CM

## 2025-05-08 PROCEDURE — 99213 OFFICE O/P EST LOW 20 MIN: CPT | Performed by: ANESTHESIOLOGY

## 2025-05-08 NOTE — PROGRESS NOTES
Chief complaint: Rectal pain    HPI   Ms. See is a 54-year-old female with a history of pain in the rectal area.  The patient has a history of ulcerative colitis and had surgery with Dr. De Leon.  She is status post a laparoscopic total abdominal colectomy, end ileostomy 5/8/23 and subsequently underwent a robotic completion proctectomy on 6/25/24.  The patient says that she has had persistent pain ever since in the rectal area.  She cannot sit for a long time.  She says that this pain severely impacts her life.  She says that the pain is 5 out of 10 with walking and 7 out of 10 with sitting.  She has seen some slight improvement in the past couple or few months.  There may be some slight improvement though slow.  The patient last had a CT scan in mid November which showed some fluid in the presacral area and at that time I did reach out to her surgeon to see if there is any concerns with pursuing injections like a ganglion impar.  Her surgeon did not have concerns at that time.  The patient does say that injections helped but not for more than a few weeks.  She also notes she is having problems with urination and is having water leakage, there is concern there may be some type of fistula, bladder to the uterus.  The patient does have an upcoming urogyn appointment.  She also continues to follow with rheumatology and they are considering injectable Biologics.  Patient continues to have pain and is wondering what next steps could be considered.  Of note, she is supposed to see her surgeon next week and has been following GI regularly.    ROS: 13 point review of systems is complete and is negative listed above in HPI    Medical History[1]    Surgical History[2]    Family History[3]    Social History[4]    Medications Ordered Prior to Encounter[5]     RX Allergies[6]       Imaging:  Narrative & Impression   Interpreted By:  Abbe Dial,   STUDY:  CT ABDOMEN PELVIS W IV CONTRAST;  11/13/2024 10:32 am       INDICATION:  55 y/o   F with  Signs/Symptoms:s/p total proctocolectomy with  perineal pain - R/O abscess.      LIMITATIONS:  None.      ACCESSION NUMBER(S):  [id number]      ORDERING CLINICIAN:  JORDAN MARTIN      TECHNIQUE:  After the administration of IV iodonated contrast, spiral axial  images were obtained from the xiphoid down through the symphysis  pubis. Sagittal and coronal reconstruction images were generated.      COMPARISON:  04/24/2024      FINDINGS:  Lower Chest: Clear.      Liver: The liver is unremarkable without focal lesion.      Gallbladder and Biliary: Unremarkable.      Pancreas: No abnormality identified in the pancreas.      Spleen: No abnormality identified in the spleen.      Adrenals: No abnormality identified in either adrenal gland.      Urinary: Subcentimeter hypodensity in the lower pole left kidney, too  small to characterize. No hydronephrosis.      Gastrointestinal/Peritoneum: No small or large bowel obstruction in  the visualized abdomen. In the abdomen, there is no extraluminal air.  Presacral fluid collection measuring 6.5 x 3 x 3.8 cm with rim  enhancement. Total colectomy with right lower quadrant ostomy. Small  fat containing parastomal hernia. Small fat containing umbilical  hernia.      Vascular: Abdominal aorta is normal in caliber.      Lymphatics: No enlarged lymph nodes by size criteria.      MSK/Body Wall: No aggressive bony lesion identified.      IMPRESSION:  Rim enhancing presacral fluid collection potentially an abscess or  postoperative fluid collection depending on the clinical setting.       Physical Exam:  Gen.: Patient appears to be stated age, pleasant    Psychiatric:  Patient is alert and oriented x3, appropriate mood and    Impression/Plan:  Ms. See is a 54-year-old female with history of ulcerative colitis who underwent surgery as per above.  She continues to have perineal/rectal pain.  She had ganglion impar blocks which do provide some relief but not  as long-lasting as she would like.  Pain continues to be severe and impacts her quality of life.    -Patient is following up with her surgeon next week.  Would be curious if there is further workup for ongoing pain or interval imaging.  If there is no surgical measures to pursue from a colorectal standpoint, may consider neuromodulation in the form of a stimulator.  Could possibly consider DRG stimulation.  We discussed that this is a 3 part process and that she would need to see pain psych, undergo a trial procedure, and then permanent implant.           [1]   Past Medical History:  Diagnosis Date    Anemia     Anxiety     Asthma     Depression     Familial hypercholesterolemia     Fatty liver     GERD (gastroesophageal reflux disease)     Ileostomy in place (Multi) 2023    Inflammatory bowel disease     Iron deficiency anemia     Obesity     Prediabetes     2023 A1C 5.5%    Ulcerative colitis     Vitamin D deficiency    [2]   Past Surgical History:  Procedure Laterality Date     SECTION, LOW TRANSVERSE  2000    10/14/1992 & 2000    COLONOSCOPY      ESOPHAGOGASTRODUODENOSCOPY      FLEXIBLE SIGMOIDOSCOPY  2024    TOTAL COLECTOMY  2023 OPERATION/PROCEDURE: Single-incision laparoscopic total abdominal colectomy with end ileostomy    TOTAL PROCTECTOMY  2024    WISDOM TOOTH EXTRACTION     [3]   Family History  Problem Relation Name Age of Onset    Arthritis Mother Pam Nacht     Hypertension Mother Pam Nacht     Heart disease Father Edward Nacht     Hypertension Father Edward Nacht     Colon polyps Father Edward Nacht     Cancer Maternal Grandmother Morena cid     Diabetes Maternal Grandmother Morena cid     Colon cancer Maternal Grandmother Morena cid     Cancer Paternal Grandfather Max nacht     Cancer Father's Brother Gianfranco Nacht     Diabetes Son Adam Noar     Diabetes Brother Raleigh Nacht     Hypertension Brother Raleigh Nancy    [4]   Social  History  Tobacco Use    Smoking status: Former     Current packs/day: 0.00     Average packs/day: 0.5 packs/day for 31.4 years (15.7 ttl pk-yrs)     Types: Cigarettes     Start date: 1984     Quit date: 2015     Years since quittin.6    Smokeless tobacco: Never   Vaping Use    Vaping status: Never Used   Substance Use Topics    Alcohol use: Not Currently     Alcohol/week: 1.0 standard drink of alcohol     Types: 1 Glasses of wine per week    Drug use: Never   [5]   Current Outpatient Medications on File Prior to Visit   Medication Sig Dispense Refill    dicyclomine (Bentyl) 10 mg capsule Take 1 capsule (10 mg) by mouth 4 times a day before meals. Take with meals and at bedtime 360 capsule 3    escitalopram (Lexapro) 20 mg tablet Take 1 tablet (20 mg) by mouth once daily at bedtime.      esomeprazole (NexIUM) 40 mg DR capsule Take 1 capsule (40 mg) by mouth 2 times a day. Do not open capsule. 180 capsule 3    ferrous sulfate, 325 mg ferrous sulfate, (FeosoL) tablet Take 1 tablet (325 mg) by mouth once daily with breakfast. 90 tablet 3    gabapentin (Neurontin) 600 mg tablet Take 0.5 tablets (300 mg) by mouth 3 times a day. 135 tablet 1    norethindrone (Jencycla) 0.35 mg tablet Take 1 tablet (0.35 mg) by mouth once daily. 84 tablet 1    rosuvastatin (Crestor) 20 mg tablet TAKE 1 TABLET BY MOUTH EVERY DAY 90 tablet 0    sucralfate (Carafate) 1 gram tablet Take 1 tablet (1 g) by mouth 4 times a day. Take 1 hour before meals and at bedtime 120 tablet 11     No current facility-administered medications on file prior to visit.   [6]   Allergies  Allergen Reactions    Amoxicillin Hives, Rash and Unknown    Aspirin GI Upset, Nausea/vomiting and Unknown    Azithromycin Nausea/vomiting and Rash    Clindamycin Rash and Unknown    Sulfamethoxazole Hives

## 2025-05-08 NOTE — PROGRESS NOTES
Maureen See is a 54 year old female with h/o UC. S/P Laparoscopic total abdominal colectomy, end ileostomy 5/8/23 and Robotic completion proctectomy on 6/25/24.    Her psoriasis has been horrible.  Her bottom is still bothering her.  Around the stoma has been ok.  No dehydration.     She is drinking well, eating well.  When she does salad it does cause some discomfort.      5/08/2023 Pathology  A.  COLON, TOTAL COLECTOMY:  --SEGMENT OF COLON WITH MILD ARCHITECTURAL DISTORTION, PANETH CELL METAPLASIA  AND MILD BASAL LYMPHOPLASMACYTIC INFLAMMATION, SEE NOTE  --FIBROUS OBLITERATION OF THE APPENDIX  --22 BENIGN LYMPH NODES WITH NO SIGNIFICANT DIAGNOSTIC CHANGE    Note: Colectomy specimen shows mild architectural distortion with Paneth cell metaplasia present in left colon with basal lymphoplasmacytic inflammation,  suggestive of chronic mucosal injury.  Active inflammation, ulceration, dysplasia, granuloma or malignancy are not identified.      6/25/24 Pathology  A. ANUS RESECTION:   -Colonic mucosa with prominent lymphoid follicles and pseudopolyps, consistent with out of circuit bowel  -Anal squamous epithelium, no significant pathologic findings   Note: Granuloma or dysplasia is not identified.    12/20/2024 CT Abd/Pelvis with contrast  1.  Postsurgical change of total colectomy. Right lower quadrant end ileostomy with unchanged small parastomal fat containing hernia without evidence of inflammation, bowel obstruction, or bowel wall thickening.  2. Similar appearance of presacral fluid collection measuring 3.4 x 3.2 x 6.3 cm, which may represent postsurgical seroma or hematoma, sterility of this collection can not be determined on imaging similar to prior.     3/10/2025-Met with Pain Management.  She was offered ganglion impar block for perineal pain.   Did she do this???    Review of Systems  Constitutional: Negative for fever, chills, anorexia, weight loss, malaise                  ENMT: Negative for nasal  discharge, congestion, ear pain, mouth pain, throat pain           Respiratory: Negative for cough, hemoptysis, wheezing, shortness of breath (+) ASTHMA                Cardiac: Negative for chest pain, dyspnea on exertion, orthopnea, palpitations, syncope, (+)HLD,      Gastrointestinal: Negative for nausea, vomiting, diarrhea, constipation, abdominal pain, (+)ULCERATIVE COLITIS, S/P TAC WITH END ILEOSTOMY MAY 2023     Genitourinary: Negative for discharge, dysuria, flank pain, frequency, hematuria                   Musculoskeletal: Negative for decreased ROM, pain, swelling, weakness              Neurological: Negative for dizziness, confusion, headache, seizures, syncope       Psychiatric: Negative for mood changes, anxiety, hallucinations, sleep changes, suicidal ideas (+) ANXIETY & DEPRESSION              Skin: Negative for mass, pain, itching, rash, ulcer , (+) PSORIASIS      Endocrine: Negative for heat intolerance, cold intolerance, excessive sweating, polyuria, excess thirst            Hematologic/Lymph: Negative for anemia, bruising, easy bleeding, night sweats, petechiae, history of DVT/PE or cancer              Allergic/Immunologic: Negative for anaphylaxis, itchy/ teary eyes, itching, sneezing, swelling      Physical Exam  Constitutional: Well developed, awake/alert/oriented x3, no distress, alert and cooperative             Eyes: Sclera anicteric, no conjunctival inflammation, conjugate gaze    ENMT: mucous membranes moist, no apparent injury,            Head/Neck: Neck supple, no apparent injury, No JVD, trachea midline, no bruits              Respiratory/Thorax: Patent airways, CTAB, normal breath sounds with good chest expansion, thorax symmetric         Cardiovascular: Regular, rate and rhythm, no murmurs, normal S1 and S2         Gastrointestinal: Nondistended, soft, non-tender, no rebound tenderness or guarding, no masses palpable, no organomegaly, +BS, stoma pink and well everted with soft brown  stool           Extremities: normal extremities, no cyanosis edema, contusions or wounds, 2+ femoral pulses B/L              Neurological: alert and oriented x3, normal strength, Normal gait          Psychological: Appropriate mood and behavior         Skin: Warm and dry, no lesions, no rashes                Perineum healed and dry - pain with pushing on the perineum.    Her vaginal exam is very firm and with pain with internal exam.       Impression:  Ulcerative colitis, s/p TPC with end ileostomy - doing great   Autoimmune arthritis    Dyspareunia, trouble relaxing to urinate.      Plan:    Agree with the nerve stimulator depending on the vaginal valium response  Agree with rheumatology work up   Repeat CT to make sure that there is no other pelvic/bladder pathology   Vaginal valium for the perineal pain I think that there is a significant levator spasm causing a lot of her pelvic issues vaginal, urinary, and pelvic pain  ? Vaginal perineal botox with Dr. Clark as there is no longer an anus for me to get the levator through.

## 2025-05-09 LAB
ALBUMIN SERPL-MCNC: 4.3 G/DL (ref 3.6–5.1)
ALP SERPL-CCNC: 108 U/L (ref 37–153)
ALT SERPL-CCNC: 26 U/L (ref 6–29)
ANION GAP SERPL CALCULATED.4IONS-SCNC: 7 MMOL/L (CALC) (ref 7–17)
AST SERPL-CCNC: 22 U/L (ref 10–35)
BASOPHILS # BLD AUTO: 41 CELLS/UL (ref 0–200)
BASOPHILS NFR BLD AUTO: 0.6 %
BILIRUB SERPL-MCNC: 0.3 MG/DL (ref 0.2–1.2)
BUN SERPL-MCNC: 13 MG/DL (ref 7–25)
CALCIUM SERPL-MCNC: 8.9 MG/DL (ref 8.6–10.4)
CCP IGG SERPL-ACNC: <16 UNITS
CHLORIDE SERPL-SCNC: 105 MMOL/L (ref 98–110)
CO2 SERPL-SCNC: 27 MMOL/L (ref 20–32)
CREAT SERPL-MCNC: 0.7 MG/DL (ref 0.5–1.03)
CRP SERPL-MCNC: 4 MG/L
EGFRCR SERPLBLD CKD-EPI 2021: 103 ML/MIN/1.73M2
EOSINOPHIL # BLD AUTO: 186 CELLS/UL (ref 15–500)
EOSINOPHIL NFR BLD AUTO: 2.7 %
ERYTHROCYTE [DISTWIDTH] IN BLOOD BY AUTOMATED COUNT: 15.6 % (ref 11–15)
ERYTHROCYTE [SEDIMENTATION RATE] IN BLOOD BY WESTERGREN METHOD: 17 MM/H
GLUCOSE SERPL-MCNC: 142 MG/DL (ref 65–139)
HBV CORE AB SERPL QL IA: NORMAL
HBV SURFACE AB SERPL IA-ACNC: <5 MIU/ML
HBV SURFACE AG SERPL QL IA: NORMAL
HCT VFR BLD AUTO: 39.1 % (ref 35–45)
HCV AB SERPL QL IA: NORMAL
HGB BLD-MCNC: 12.1 G/DL (ref 11.7–15.5)
HLA-B27 QL NAA+PROBE: NORMAL
IGNF NEG CNTRL BLD: NORMAL
LYMPHOCYTES # BLD AUTO: 1780 CELLS/UL (ref 850–3900)
LYMPHOCYTES NFR BLD AUTO: 25.8 %
M TB IFN-G BLD-IMP: NEGATIVE
MCH RBC QN AUTO: 27.9 PG (ref 27–33)
MCHC RBC AUTO-ENTMCNC: 30.9 G/DL (ref 32–36)
MCV RBC AUTO: 90.3 FL (ref 80–100)
MITOGEN IGNF.SPOT COUNT BLD: NORMAL
MONOCYTES # BLD AUTO: 449 CELLS/UL (ref 200–950)
MONOCYTES NFR BLD AUTO: 6.5 %
NEUTROPHILS # BLD AUTO: 4444 CELLS/UL (ref 1500–7800)
NEUTROPHILS NFR BLD AUTO: 64.4 %
PLATELET # BLD AUTO: 268 THOUSAND/UL (ref 140–400)
PMV BLD REES-ECKER: 11.7 FL (ref 7.5–12.5)
POTASSIUM SERPL-SCNC: 4.2 MMOL/L (ref 3.5–5.3)
PROT SERPL-MCNC: 7.1 G/DL (ref 6.1–8.1)
QUEST HLAB27 TYPING RESULTS REVIEWED BY:: NORMAL
QUEST PANEL A SPOT COUNT: 1
QUEST PANEL B SPOT COUNT: 1
RBC # BLD AUTO: 4.33 MILLION/UL (ref 3.8–5.1)
RHEUMATOID FACT SERPL-ACNC: <10 IU/ML
SODIUM SERPL-SCNC: 139 MMOL/L (ref 135–146)
WBC # BLD AUTO: 6.9 THOUSAND/UL (ref 3.8–10.8)

## 2025-05-13 LAB
ALBUMIN SERPL-MCNC: 4.3 G/DL (ref 3.6–5.1)
ALP SERPL-CCNC: 108 U/L (ref 37–153)
ALT SERPL-CCNC: 26 U/L (ref 6–29)
ANION GAP SERPL CALCULATED.4IONS-SCNC: 7 MMOL/L (CALC) (ref 7–17)
AST SERPL-CCNC: 22 U/L (ref 10–35)
BASOPHILS # BLD AUTO: 41 CELLS/UL (ref 0–200)
BASOPHILS NFR BLD AUTO: 0.6 %
BILIRUB SERPL-MCNC: 0.3 MG/DL (ref 0.2–1.2)
BUN SERPL-MCNC: 13 MG/DL (ref 7–25)
CALCIUM SERPL-MCNC: 8.9 MG/DL (ref 8.6–10.4)
CCP IGG SERPL-ACNC: <16 UNITS
CHLORIDE SERPL-SCNC: 105 MMOL/L (ref 98–110)
CO2 SERPL-SCNC: 27 MMOL/L (ref 20–32)
CREAT SERPL-MCNC: 0.7 MG/DL (ref 0.5–1.03)
CRP SERPL-MCNC: 4 MG/L
EGFRCR SERPLBLD CKD-EPI 2021: 103 ML/MIN/1.73M2
EOSINOPHIL # BLD AUTO: 186 CELLS/UL (ref 15–500)
EOSINOPHIL NFR BLD AUTO: 2.7 %
ERYTHROCYTE [DISTWIDTH] IN BLOOD BY AUTOMATED COUNT: 15.6 % (ref 11–15)
ERYTHROCYTE [SEDIMENTATION RATE] IN BLOOD BY WESTERGREN METHOD: 17 MM/H
GLUCOSE SERPL-MCNC: 142 MG/DL (ref 65–139)
HBV CORE AB SERPL QL IA: NORMAL
HBV SURFACE AB SERPL IA-ACNC: <5 MIU/ML
HBV SURFACE AG SERPL QL IA: NORMAL
HCT VFR BLD AUTO: 39.1 % (ref 35–45)
HCV AB SERPL QL IA: NORMAL
HGB BLD-MCNC: 12.1 G/DL (ref 11.7–15.5)
HLA-B27 QL NAA+PROBE: NEGATIVE
IGNF NEG CNTRL BLD: NORMAL
LYMPHOCYTES # BLD AUTO: 1780 CELLS/UL (ref 850–3900)
LYMPHOCYTES NFR BLD AUTO: 25.8 %
M TB IFN-G BLD-IMP: NEGATIVE
MCH RBC QN AUTO: 27.9 PG (ref 27–33)
MCHC RBC AUTO-ENTMCNC: 30.9 G/DL (ref 32–36)
MCV RBC AUTO: 90.3 FL (ref 80–100)
MITOGEN IGNF.SPOT COUNT BLD: NORMAL
MONOCYTES # BLD AUTO: 449 CELLS/UL (ref 200–950)
MONOCYTES NFR BLD AUTO: 6.5 %
NEUTROPHILS # BLD AUTO: 4444 CELLS/UL (ref 1500–7800)
NEUTROPHILS NFR BLD AUTO: 64.4 %
PLATELET # BLD AUTO: 268 THOUSAND/UL (ref 140–400)
PMV BLD REES-ECKER: 11.7 FL (ref 7.5–12.5)
POTASSIUM SERPL-SCNC: 4.2 MMOL/L (ref 3.5–5.3)
PROT SERPL-MCNC: 7.1 G/DL (ref 6.1–8.1)
QUEST HLAB27 TYPING RESULTS REVIEWED BY:: NORMAL
QUEST PANEL A SPOT COUNT: 1
QUEST PANEL B SPOT COUNT: 1
RBC # BLD AUTO: 4.33 MILLION/UL (ref 3.8–5.1)
RHEUMATOID FACT SERPL-ACNC: <10 IU/ML
SODIUM SERPL-SCNC: 139 MMOL/L (ref 135–146)
WBC # BLD AUTO: 6.9 THOUSAND/UL (ref 3.8–10.8)

## 2025-05-14 ENCOUNTER — OFFICE VISIT (OUTPATIENT)
Dept: SURGERY | Facility: CLINIC | Age: 55
End: 2025-05-14
Payer: COMMERCIAL

## 2025-05-14 VITALS
HEART RATE: 76 BPM | TEMPERATURE: 96.8 F | BODY MASS INDEX: 32.69 KG/M2 | SYSTOLIC BLOOD PRESSURE: 160 MMHG | WEIGHT: 173 LBS | DIASTOLIC BLOOD PRESSURE: 95 MMHG

## 2025-05-14 DIAGNOSIS — K59.4 LEVATOR SYNDROME: Primary | ICD-10-CM

## 2025-05-14 PROCEDURE — 99214 OFFICE O/P EST MOD 30 MIN: CPT | Performed by: COLON & RECTAL SURGERY

## 2025-05-14 ASSESSMENT — PAIN SCALES - GENERAL: PAINLEVEL_OUTOF10: 6

## 2025-05-14 NOTE — NURSING NOTE
Wound Care Consult     Visit Date: 5/14/2025      Patient Name: Maureen See         MRN: [id number]             [facility] nursing visit outcome: Pt here to see Dr. De Leon related to pain issues.  Denies issues with pouching, she changes her pouch every 3-4 days and empties 3-4times daily.  Currently wearing Coloplast [id number] soft convex 1-piece cut to fit pouch with Remy seal and Brava belt.  Her preference is to crust the skin, uses Adapt powder and SNS skin barrier wipes.      Provided this [facility] nurse's contact information and educated on outpatient clinic hours.     Time Increment: 15 minutes    Denita GUAJARDON, RN, CWOCN  5/14/2025  4:25 PM        
2 seconds or less

## 2025-05-15 DIAGNOSIS — R10.2 PERINEAL PAIN: ICD-10-CM

## 2025-05-21 DIAGNOSIS — L40.50 PSORIATIC ARTHRITIS (MULTI): ICD-10-CM

## 2025-05-21 DIAGNOSIS — Z79.899 HIGH RISK MEDICATION USE: ICD-10-CM

## 2025-05-21 DIAGNOSIS — M12.9 ARTHROPATHY ASSOCIATED WITH INFLAMMATORY BOWEL DISEASE: Primary | ICD-10-CM

## 2025-05-21 DIAGNOSIS — K52.9 ARTHROPATHY ASSOCIATED WITH INFLAMMATORY BOWEL DISEASE: Primary | ICD-10-CM

## 2025-05-21 DIAGNOSIS — M19.90 INFLAMMATORY ARTHRITIS: ICD-10-CM

## 2025-05-27 ENCOUNTER — APPOINTMENT (OUTPATIENT)
Dept: RADIOLOGY | Facility: CLINIC | Age: 55
End: 2025-05-27
Payer: COMMERCIAL

## 2025-05-28 DIAGNOSIS — L40.50 PSORIATIC ARTHROPATHY (MULTI): Primary | ICD-10-CM

## 2025-05-28 RX ORDER — ADALIMUMAB-ADAZ 40 MG/.4ML
40 INJECTION, SOLUTION SUBCUTANEOUS
Qty: 0.8 ML | Refills: 11 | Status: SHIPPED | OUTPATIENT
Start: 2025-05-28 | End: 2025-05-29 | Stop reason: SDUPTHER

## 2025-05-28 NOTE — PROGRESS NOTES
PA has been approved for adalimumab-adaz (unbranded), but patient has to fill with [facility].  Can you please send a new script there?

## 2025-05-29 DIAGNOSIS — L40.50 PSORIATIC ARTHROPATHY (MULTI): ICD-10-CM

## 2025-05-29 RX ORDER — ADALIMUMAB-ADAZ 40 MG/.4ML
40 INJECTION, SOLUTION SUBCUTANEOUS
Qty: 0.8 ML | Refills: 11 | Status: SHIPPED | OUTPATIENT
Start: 2025-05-29

## 2025-05-31 LAB
CREAT SERPL-MCNC: 0.74 MG/DL (ref 0.5–1.03)
EGFRCR SERPLBLD CKD-EPI 2021: 97 ML/MIN/1.73M2

## 2025-06-03 ENCOUNTER — APPOINTMENT (OUTPATIENT)
Dept: RADIOLOGY | Facility: CLINIC | Age: 55
End: 2025-06-03
Payer: COMMERCIAL

## 2025-06-10 ENCOUNTER — APPOINTMENT (OUTPATIENT)
Dept: RADIOLOGY | Facility: CLINIC | Age: 55
End: 2025-06-10
Payer: COMMERCIAL

## 2025-06-16 ENCOUNTER — PATIENT MESSAGE (OUTPATIENT)
Dept: SURGERY | Facility: CLINIC | Age: 55
End: 2025-06-16
Payer: COMMERCIAL

## 2025-06-17 ENCOUNTER — APPOINTMENT (OUTPATIENT)
Dept: RADIOLOGY | Facility: CLINIC | Age: 55
End: 2025-06-17
Payer: COMMERCIAL

## 2025-06-23 DIAGNOSIS — B37.2 SKIN YEAST INFECTION: ICD-10-CM

## 2025-06-23 RX ORDER — NYSTATIN 100000 [USP'U]/G
POWDER TOPICAL
Qty: 60 G | Refills: 1 | Status: SHIPPED | OUTPATIENT
Start: 2025-06-23

## 2025-06-24 ENCOUNTER — APPOINTMENT (OUTPATIENT)
Dept: RADIOLOGY | Facility: CLINIC | Age: 55
End: 2025-06-24
Payer: COMMERCIAL

## 2025-06-24 ASSESSMENT — DERMATOLOGY QUALITY OF LIFE (QOL) ASSESSMENT
RATE HOW EMOTIONALLY BOTHERED YOU ARE BY YOUR SKIN PROBLEM (FOR EXAMPLE, WORRY, EMBARRASSMENT, FRUSTRATION): 6 - ALWAYS BOTHERED
RATE HOW BOTHERED YOU ARE BY SYMPTOMS OF YOUR SKIN PROBLEM (EG, ITCHING, STINGING BURNING, HURTING OR SKIN IRRITATION): 6 - ALWAYS BOTHERED
WHAT SINGLE SKIN CONDITION LISTED BELOW IS THE PATIENT ANSWERING THE QUALITY-OF-LIFE ASSESSMENT QUESTIONS ABOUT: PSORIASIS
RATE HOW BOTHERED YOU ARE BY EFFECTS OF YOUR SKIN PROBLEMS ON YOUR ACTIVITIES (EG, GOING OUT, ACCOMPLISHING WHAT YOU WANT, WORK ACTIVITIES OR YOUR RELATIONSHIPS WITH OTHERS): 6 - ALWAYS BOTHERED

## 2025-06-30 ENCOUNTER — OFFICE VISIT (OUTPATIENT)
Dept: OBSTETRICS AND GYNECOLOGY | Facility: CLINIC | Age: 55
End: 2025-06-30
Payer: COMMERCIAL

## 2025-06-30 ENCOUNTER — TELEPHONE (OUTPATIENT)
Dept: OBSTETRICS AND GYNECOLOGY | Facility: CLINIC | Age: 55
End: 2025-06-30

## 2025-06-30 ENCOUNTER — PREP FOR PROCEDURE (OUTPATIENT)
Dept: OBSTETRICS AND GYNECOLOGY | Facility: CLINIC | Age: 55
End: 2025-06-30

## 2025-06-30 ENCOUNTER — APPOINTMENT (OUTPATIENT)
Dept: RADIOLOGY | Facility: CLINIC | Age: 55
End: 2025-06-30
Payer: COMMERCIAL

## 2025-06-30 VITALS
SYSTOLIC BLOOD PRESSURE: 128 MMHG | DIASTOLIC BLOOD PRESSURE: 77 MMHG | BODY MASS INDEX: 33.38 KG/M2 | WEIGHT: 176.8 LBS | HEART RATE: 83 BPM | HEIGHT: 61 IN

## 2025-06-30 DIAGNOSIS — R31.9 HEMATURIA, UNSPECIFIED TYPE: ICD-10-CM

## 2025-06-30 DIAGNOSIS — Z79.899 BENZODIAZEPINE CONTRACT EXISTS: ICD-10-CM

## 2025-06-30 DIAGNOSIS — N39.9 URINARY DISORDER: ICD-10-CM

## 2025-06-30 DIAGNOSIS — R10.2 PELVIC PAIN IN FEMALE: ICD-10-CM

## 2025-06-30 DIAGNOSIS — M79.18 MYOFASCIAL PAIN SYNDROME: ICD-10-CM

## 2025-06-30 DIAGNOSIS — N39.9 URINARY DISORDER: Primary | ICD-10-CM

## 2025-06-30 DIAGNOSIS — M79.18 MYOFASCIAL PAIN SYNDROME: Primary | ICD-10-CM

## 2025-06-30 LAB
POC APPEARANCE, URINE: CLEAR
POC BILIRUBIN, URINE: NEGATIVE
POC BLOOD, URINE: ABNORMAL
POC COLOR, URINE: ABNORMAL
POC GLUCOSE, URINE: NEGATIVE MG/DL
POC KETONES, URINE: NEGATIVE MG/DL
POC LEUKOCYTES, URINE: NEGATIVE
POC NITRITE,URINE: NEGATIVE
POC PH, URINE: 6 PH
POC PROTEIN, URINE: ABNORMAL MG/DL
POC SPECIFIC GRAVITY, URINE: >=1.03
POC UROBILINOGEN, URINE: 0.2 EU/DL

## 2025-06-30 PROCEDURE — 99214 OFFICE O/P EST MOD 30 MIN: CPT | Mod: 25 | Performed by: OBSTETRICS & GYNECOLOGY

## 2025-06-30 PROCEDURE — 1036F TOBACCO NON-USER: CPT | Performed by: OBSTETRICS & GYNECOLOGY

## 2025-06-30 PROCEDURE — 3008F BODY MASS INDEX DOCD: CPT | Performed by: OBSTETRICS & GYNECOLOGY

## 2025-06-30 PROCEDURE — 51798 US URINE CAPACITY MEASURE: CPT | Performed by: OBSTETRICS & GYNECOLOGY

## 2025-06-30 PROCEDURE — 99204 OFFICE O/P NEW MOD 45 MIN: CPT | Performed by: OBSTETRICS & GYNECOLOGY

## 2025-06-30 PROCEDURE — 81003 URINALYSIS AUTO W/O SCOPE: CPT | Mod: QW | Performed by: OBSTETRICS & GYNECOLOGY

## 2025-06-30 RX ORDER — PHENAZOPYRIDINE HYDROCHLORIDE 200 MG/1
200 TABLET, FILM COATED ORAL ONCE
OUTPATIENT
Start: 2025-06-30 | End: 2025-06-30

## 2025-06-30 RX ORDER — DIAZEPAM 5 MG/1
5 TABLET ORAL EVERY 8 HOURS PRN
Qty: 60 TABLET | Refills: 0 | Status: SHIPPED | OUTPATIENT
Start: 2025-06-30 | End: 2025-07-30

## 2025-06-30 RX ORDER — GABAPENTIN 600 MG/1
600 TABLET ORAL ONCE
OUTPATIENT
Start: 2025-06-30 | End: 2025-06-30

## 2025-06-30 RX ORDER — ACETAMINOPHEN 325 MG/1
975 TABLET ORAL ONCE
OUTPATIENT
Start: 2025-06-30 | End: 2025-06-30

## 2025-06-30 RX ORDER — DIAZEPAM 5 MG/1
5 TABLET ORAL EVERY 8 HOURS PRN
Qty: 90 TABLET | Refills: 0 | Status: SHIPPED | OUTPATIENT
Start: 2025-06-30 | End: 2025-06-30

## 2025-06-30 ASSESSMENT — ENCOUNTER SYMPTOMS
DEPRESSION: 0
LOSS OF SENSATION IN FEET: 0
OCCASIONAL FEELINGS OF UNSTEADINESS: 0

## 2025-06-30 ASSESSMENT — PAIN SCALES - GENERAL: PAINLEVEL_OUTOF10: 6

## 2025-06-30 NOTE — PROGRESS NOTES
Urogynecology  Provider:  Azul Clark MD  [phone]        ASSESSMENT AND PLAN:   54 y.o. female being assessed for MPP and possible urine leakage. Of note, she is s/p total colectomy 2023 and s/p proctectomy June 2024 for severe ulcerative colitis.     Diagnoses:   #1 Myofascial pelvic pain   #2 Possible urine leakage     Plan:   1. Myofascial pelvic pain   - She has a severely short and tight pelvic floor, which prevented her from tolerating a speculum exam.   - She was recommended an exam under anesthesia due to extremely limited physical exam  at today's visit due to her extreme pelvic pain. Will also do a pap at that time as well as PF botox injections with bilateral pudendal blocks.   - We will attempt to see if her insurance will cover this.   - In the interim, I would like her to start PFPT to at least get in and get started on a relationship with someone. She is amenable to this.   - We will also have her sign a benzo contract today.   - I will send Diazepam 5 mg po to use on PRN basis q8 hours.   - Her pain is quite severe and she is likely one of the worst pelvic floor cases I've seen in many years, but I am hopeful that we will be able to get her better.   - Benzo consent was signed today and we will send a urine drug screen.   - urine drug screen sent    2. Possible urine leakage vs vaginal discharge   - Follow up with the patient after the Pyridium pad test results. Take Pyridium BID x7 days. She will monitor whether the fluid is orange or not.      Follow-up with Dr. Clark in the OR.     Scribe Attestation:   I, Belkys Robles, am scribing for virtually, and in the presence of Azul Clark MD on 06/30/2025 at 5:25 PM.       Problem List Items Addressed This Visit    None          I spent a total of 50 minutes in face to face and non face to face time.      Azul Clark MD              HISTORY OF PRESENT ILLNESS:     Maureen See is a 54 y.o. female who presents for pelvic  pain. She is on medical leave now due to her significant pelvic pain. 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 Sent in consultation by Jessy SAAVEDRA.    Record review:  - 11/26/24 Pelvic U/S:   IMPRESSION:  1. The finding described on the prior CT likely representing free  fluid in the cul-de-sac. This has decreased in size when compared to  the prior CT, no intervention was performed.    Pelvic Symptoms:  - The patient reports severe pain inside the rectum and vagina since the proctectomy. The pain is so intense that it affects intimacy with her .  - Describes the pain as feeling like her whole body is on fire.  - The patient is unable to sit comfortably and has been using a pillow for relief.  - Jessy Mo was concerned for a fistula.   - Another CT has been ordered.   - Vaginal Valium did not help, but caused grogginess.   - She also states fluid flushes out of her, like a gush. This sx started a few weeks after surgery. Pt states a CT was done after surgery and she was referred to a specialist. This doctor advised against further surgery to treat post surgical fluid collection in pre sacral space found on CT.   - Patient is unsure where the fluid is coming from (bladder vs vagina).   - Sometimes the fluid is clear while other times the fluid is clear with some white. This occurs every few days.   - Her urine stream stops and starts.     OBGYN Hx and Sexual Activity:   - She has a uterus.   - She states it has been a long time since she's had a pap smear.   - Denies hx of sexual trauma or abuse.        Past Medical History:       Medical History[1]       Past Surgical History:     -  she is s/p total colectomy 2023, s/p proctectomy June 2024 due to UC  Surgical History[2]      Medications:       Prior to Admission medications    Medication Sig Start Date End Date Taking? Authorizing Provider   adalimumab-adaz 40 mg/0.4 mL pen injector Inject 0.4 mL (40 mg)  under the skin every 14 (fourteen) days. 5/29/25   Ernesto Peñaloza MD   dicyclomine (Bentyl) 10 mg capsule Take 1 capsule (10 mg) by mouth 4 times a day before meals. Take with meals and at bedtime 2/13/25 2/13/26  NELLIE Flowers   escitalopram (Lexapro) 20 mg tablet Take 1 tablet (20 mg) by mouth once daily at bedtime. 4/26/22   Historical Provider, MD   esomeprazole (NexIUM) 40 mg DR capsule Take 1 capsule (40 mg) by mouth 2 times a day. Do not open capsule. 2/13/25 2/13/26  NELLIE Flowers   ferrous sulfate, 325 mg ferrous sulfate, (FeosoL) tablet Take 1 tablet (325 mg) by mouth once daily with breakfast. 2/13/25 2/13/26  NELLIE Flowers   gabapentin (Neurontin) 600 mg tablet Take 0.5 tablets (300 mg) by mouth 3 times a day. 2/13/25   Brianna Hernandez MD   norethindrone (Jencycla) 0.35 mg tablet Take 1 tablet (0.35 mg) by mouth once daily. 4/16/25 7/15/25  NELLIE Flowers   nystatin (Mycostatin) 100,000 unit/gram powder Apply sparingly to the peristomal skin at time of pouch change.  60 6/23/25   Shantel De Leon MD   rosuvastatin (Crestor) 20 mg tablet TAKE 1 TABLET BY MOUTH EVERY DAY 3/31/25   Brianna Hernandez MD   sucralfate (Carafate) 1 gram tablet Take 1 tablet (1 g) by mouth 4 times a day. Take 1 hour before meals and at bedtime 3/20/25 3/20/26  NELLIE Flowers        ROS  Review of Systems       PHYSICAL EXAM:      There were no vitals taken for this visit.     No LMP recorded. Patient has had an implant.      Declines chaperone for physical exam.    PVR= 0 ml by U/S    Well developed, well nourished  Neurologic/Psychiatric:  Awake, Alert and Oriented times 3.  Affect normal. Normal cranial nerves  Sexual maturity: Adam stage V    GENITAL/URINARY:       External Genitalia:  The patient has normal appearing external genitalia, normal skenes and bartholins glands, and a normal hair distribution.  Her vulva is without lesions, erythema or discharge.      Urethral Meatus:  Size normal, Location normal, Lesions absent, Prolapse absent,      Urethra:  Fullness absent, Masses absent,      Vagina:  General appearance normal, attempted to place a speculum but I only got ~2-3 cm in before the pt had excruciating pain, but I had no obstructions to speculum placement, she states she does have a uterus intact, but pt has severely short and tight pelvic floor which prevented her from tolerating a speculum exam. Finger inserted with patent vagina and no mass but pt barely able to tolerate. Not able to feel uterus.    White liquid discharge noted from vagina consistent with vaginal discharge but copious.     Anus/Perineum: no rectal exam, she is s/p proctectomy         Azul Clark MD         [1]   Past Medical History:  Diagnosis Date    Anemia     Anxiety     Asthma     Depression     Familial hypercholesterolemia     Fatty liver     GERD (gastroesophageal reflux disease)     Ileostomy in place (Multi) 2023    Inflammatory bowel disease     Iron deficiency anemia     Obesity     Prediabetes     2023 A1C 5.5%    Ulcerative colitis     Vitamin D deficiency    [2]   Past Surgical History:  Procedure Laterality Date     SECTION, LOW TRANSVERSE  2000    10/14/1992 & 2000    COLONOSCOPY      ESOPHAGOGASTRODUODENOSCOPY      FLEXIBLE SIGMOIDOSCOPY  2024    OTHER SURGICAL HISTORY  2023    TOTAL COLECTOMY  2023 OPERATION/PROCEDURE: Single-incision laparoscopic total abdominal colectomy with end ileostomy    TOTAL PROCTECTOMY  2024    WISDOM TOOTH EXTRACTION

## 2025-06-30 NOTE — LETTER
June 30, 2025     NELLIE Flowers  Office Address Unavailable  As Of 4/26/2025    Patient: Maureen See   YOB: 1970   Date of Visit: 6/30/2025       Dear Dr. Jessy Mo, KERI-CNP:    Thank you for referring Maureen See to me for evaluation. Below are my notes for this consultation.  If you have questions, please do not hesitate to call me. I look forward to following your patient along with you.       Sincerely,     Azul Clark MD      CC: Brianna Hernandez MD  ______________________________________________________________________________________    Urogynecology  Provider:  Azul Clark MD  [phone]        ASSESSMENT AND PLAN:   54 y.o. female being assessed for MPP and possible urine leakage. Of note, she is s/p total colectomy 2023 and s/p proctectomy June 2024 for severe ulcerative colitis.     Diagnoses:   #1 Myofascial pelvic pain   #2 Possible urine leakage     Plan:   1. Myofascial pelvic pain   - She has a severely short and tight pelvic floor, which prevented her from tolerating a speculum exam.   - She was recommended an exam under anesthesia due to extremely limited physical exam  at today's visit due to her extreme pelvic pain. Will also do a pap at that time as well as PF botox injections with bilateral pudendal blocks.   - We will attempt to see if her insurance will cover this.   - In the interim, I would like her to start PFPT to at least get in and get started on a relationship with someone. She is amenable to this.   - We will also have her sign a benzo contract today.   - I will send Diazepam 5 mg po to use on PRN basis q8 hours.   - Her pain is quite severe and she is likely one of the worst pelvic floor cases I've seen in many years, but I am hopeful that we will be able to get her better.   - Benzo consent was signed today and we will send a urine drug screen.   - urine drug screen sent    2. Possible urine leakage vs vaginal discharge   - Follow  up with the patient after the Pyridium pad test results. Take Pyridium BID x7 days. She will monitor whether the fluid is orange or not.      Follow-up with Dr. Clark in the OR.     Scribe Attestation:   I, Belkys Robles, am scribing for virtually, and in the presence of Azul Clark MD on 06/30/2025 at 5:25 PM.       Problem List Items Addressed This Visit    None          I spent a total of 50 minutes in face to face and non face to face time.      Azul Clark MD              HISTORY OF PRESENT ILLNESS:     Maureen See is a 54 y.o. female who presents for pelvic pain. She is on medical leave now due to her significant pelvic pain. 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 Sent in consultation by Jessy ASAVEDRA.    Record review:  - 11/26/24 Pelvic U/S:   IMPRESSION:  1. The finding described on the prior CT likely representing free  fluid in the cul-de-sac. This has decreased in size when compared to  the prior CT, no intervention was performed.    Pelvic Symptoms:  - The patient reports severe pain inside the rectum and vagina since the proctectomy. The pain is so intense that it affects intimacy with her .  - Describes the pain as feeling like her whole body is on fire.  - The patient is unable to sit comfortably and has been using a pillow for relief.  - Jessy Mo was concerned for a fistula.   - Another CT has been ordered.   - Vaginal Valium did not help, but caused grogginess.   - She also states fluid flushes out of her, like a gush. This sx started a few weeks after surgery. Pt states a CT was done after surgery and she was referred to a specialist. This doctor advised against further surgery to treat post surgical fluid collection in pre sacral space found on CT.   - Patient is unsure where the fluid is coming from (bladder vs vagina).   - Sometimes the fluid is clear while other times the fluid is clear with some white. This occurs  every few days.   - Her urine stream stops and starts.     OBGYN Hx and Sexual Activity:   - She has a uterus.   - She states it has been a long time since she's had a pap smear.   - Denies hx of sexual trauma or abuse.        Past Medical History:       Medical History[1]       Past Surgical History:     -  she is s/p total colectomy 2023, s/p proctectomy June 2024 due to UC  Surgical History[2]      Medications:       Prior to Admission medications    Medication Sig Start Date End Date Taking? Authorizing Provider   adalimumab-adaz 40 mg/0.4 mL pen injector Inject 0.4 mL (40 mg) under the skin every 14 (fourteen) days. 5/29/25   Ernesto Peñaloza MD   dicyclomine (Bentyl) 10 mg capsule Take 1 capsule (10 mg) by mouth 4 times a day before meals. Take with meals and at bedtime 2/13/25 2/13/26  KERI Flowers-CNP   escitalopram (Lexapro) 20 mg tablet Take 1 tablet (20 mg) by mouth once daily at bedtime. 4/26/22   Historical Provider, MD   esomeprazole (NexIUM) 40 mg DR capsule Take 1 capsule (40 mg) by mouth 2 times a day. Do not open capsule. 2/13/25 2/13/26  NELLIE Flowers   ferrous sulfate, 325 mg ferrous sulfate, (FeosoL) tablet Take 1 tablet (325 mg) by mouth once daily with breakfast. 2/13/25 2/13/26  NELLIE Flowers   gabapentin (Neurontin) 600 mg tablet Take 0.5 tablets (300 mg) by mouth 3 times a day. 2/13/25   Brianna Hernandez MD   norethindrone (Jencycla) 0.35 mg tablet Take 1 tablet (0.35 mg) by mouth once daily. 4/16/25 7/15/25  NELLIE Flowers   nystatin (Mycostatin) 100,000 unit/gram powder Apply sparingly to the peristomal skin at time of pouch change.  60 6/23/25   Shantel De Leon MD   rosuvastatin (Crestor) 20 mg tablet TAKE 1 TABLET BY MOUTH EVERY DAY 3/31/25   Brianna Hernandez MD   sucralfate (Carafate) 1 gram tablet Take 1 tablet (1 g) by mouth 4 times a day. Take 1 hour before meals and at bedtime 3/20/25 3/20/26  KERI Flowers-CNP        ROS  Review  of Systems       PHYSICAL EXAM:      There were no vitals taken for this visit.     No LMP recorded. Patient has had an implant.      Declines chaperone for physical exam.    PVR= 0 ml by U/S    Well developed, well nourished  Neurologic/Psychiatric:  Awake, Alert and Oriented times 3.  Affect normal. Normal cranial nerves  Sexual maturity: Adam stage V    GENITAL/URINARY:       External Genitalia:  The patient has normal appearing external genitalia, normal skenes and bartholins glands, and a normal hair distribution.  Her vulva is without lesions, erythema or discharge.     Urethral Meatus:  Size normal, Location normal, Lesions absent, Prolapse absent,      Urethra:  Fullness absent, Masses absent,      Vagina:  General appearance normal, attempted to place a speculum but I only got ~2-3 cm in before the pt had excruciating pain, but I had no obstructions to speculum placement, she states she does have a uterus intact, but pt has severely short and tight pelvic floor which prevented her from tolerating a speculum exam. Finger inserted with patent vagina and no mass but pt barely able to tolerate. Not able to feel uterus.    White liquid discharge noted from vagina consistent with vaginal discharge but copious.     Anus/Perineum: no rectal exam, she is s/p proctectomy         Azul Clark MD           [1]  Past Medical History:  Diagnosis Date   • Anemia    • Anxiety    • Asthma    • Depression    • Familial hypercholesterolemia    • Fatty liver    • GERD (gastroesophageal reflux disease)    • Ileostomy in place (Multi) 2023   • Inflammatory bowel disease    • Iron deficiency anemia    • Obesity    • Prediabetes     2023 A1C 5.5%   • Ulcerative colitis    • Vitamin D deficiency    [2]  Past Surgical History:  Procedure Laterality Date   •  SECTION, LOW TRANSVERSE  2000    10/14/1992 & 2000   • COLONOSCOPY     • ESOPHAGOGASTRODUODENOSCOPY     • FLEXIBLE SIGMOIDOSCOPY  2024   •  OTHER SURGICAL HISTORY  05/2023 06/2024   • TOTAL COLECTOMY  05/08/2023 05/08/2023 OPERATION/PROCEDURE: Single-incision laparoscopic total abdominal colectomy with end ileostomy   • TOTAL PROCTECTOMY  06/2024   • WISDOM TOOTH EXTRACTION

## 2025-07-01 ENCOUNTER — APPOINTMENT (OUTPATIENT)
Dept: OBSTETRICS AND GYNECOLOGY | Facility: CLINIC | Age: 55
End: 2025-07-01
Payer: COMMERCIAL

## 2025-07-01 RX ORDER — PHENAZOPYRIDINE HYDROCHLORIDE 200 MG/1
200 TABLET, FILM COATED ORAL 2 TIMES DAILY
Qty: 14 TABLET | Refills: 0 | Status: SHIPPED | OUTPATIENT
Start: 2025-07-01 | End: 2025-07-08

## 2025-07-02 ENCOUNTER — TELEPHONE (OUTPATIENT)
Dept: OBSTETRICS AND GYNECOLOGY | Facility: CLINIC | Age: 55
End: 2025-07-02

## 2025-07-02 LAB — BACTERIA UR CULT: NORMAL

## 2025-07-07 ENCOUNTER — APPOINTMENT (OUTPATIENT)
Dept: RADIOLOGY | Facility: CLINIC | Age: 55
End: 2025-07-07
Payer: COMMERCIAL

## 2025-07-07 PROBLEM — M79.18 MYOFASCIAL PAIN SYNDROME: Status: ACTIVE | Noted: 2025-06-30

## 2025-07-15 ASSESSMENT — DERMATOLOGY QUALITY OF LIFE (QOL) ASSESSMENT
WHAT SINGLE SKIN CONDITION LISTED BELOW IS THE PATIENT ANSWERING THE QUALITY-OF-LIFE ASSESSMENT QUESTIONS ABOUT: PSORIASIS
RATE HOW EMOTIONALLY BOTHERED YOU ARE BY YOUR SKIN PROBLEM (FOR EXAMPLE, WORRY, EMBARRASSMENT, FRUSTRATION): 6 - ALWAYS BOTHERED
RATE HOW BOTHERED YOU ARE BY SYMPTOMS OF YOUR SKIN PROBLEM (EG, ITCHING, STINGING BURNING, HURTING OR SKIN IRRITATION): 6 - ALWAYS BOTHERED
RATE HOW BOTHERED YOU ARE BY EFFECTS OF YOUR SKIN PROBLEMS ON YOUR ACTIVITIES (EG, GOING OUT, ACCOMPLISHING WHAT YOU WANT, WORK ACTIVITIES OR YOUR RELATIONSHIPS WITH OTHERS): 6 - ALWAYS BOTHERED

## 2025-07-16 ENCOUNTER — APPOINTMENT (OUTPATIENT)
Dept: RADIOLOGY | Facility: CLINIC | Age: 55
End: 2025-07-16
Payer: COMMERCIAL

## 2025-07-21 ENCOUNTER — TELEPHONE (OUTPATIENT)
Dept: SURGERY | Facility: CLINIC | Age: 55
End: 2025-07-21
Payer: COMMERCIAL

## 2025-07-21 NOTE — TELEPHONE ENCOUNTER
From: George Deleon <[email or web address]  Sent: Monday, July 21, 2025 1:08 PM  To: Germaine Rivera <[email or web address]  Subject: Maureen See [id number]     Jonathon from [facility] care team said pt is preapproved to have CT scan done at [facility] but they need a signed order faxed to them.  Fax [phone]  [facility] Phone [phone]  Case #:  [id number]     Patient evaluated by Dr. De Leon May 14, 2025 for complaint of perineal pain.  A CT was ordered at time of visit.  Patient scheduled the CT nine times and cancelled each time.  Order for CT faxed to [facility].  Germaine Rivera RN

## 2025-07-23 ENCOUNTER — APPOINTMENT (OUTPATIENT)
Facility: CLINIC | Age: 55
End: 2025-07-23
Payer: COMMERCIAL

## 2025-07-24 ENCOUNTER — APPOINTMENT (OUTPATIENT)
Dept: RADIOLOGY | Facility: CLINIC | Age: 55
End: 2025-07-24
Payer: COMMERCIAL

## 2025-07-30 ENCOUNTER — TELEPHONE (OUTPATIENT)
Dept: OBSTETRICS AND GYNECOLOGY | Facility: CLINIC | Age: 55
End: 2025-07-30
Payer: COMMERCIAL

## 2025-07-30 NOTE — TELEPHONE ENCOUNTER
Patient called and said a order was suppose to be put in for blood work to get done before her surgery.

## 2025-07-31 RX ORDER — ALBUTEROL SULFATE 90 UG/1
2 INHALANT RESPIRATORY (INHALATION) EVERY 6 HOURS PRN
COMMUNITY

## 2025-07-31 ASSESSMENT — ENCOUNTER SYMPTOMS
RECTAL PAIN: 1
ARTHRALGIAS: 1

## 2025-08-01 ENCOUNTER — TELEPHONE (OUTPATIENT)
Dept: OBSTETRICS AND GYNECOLOGY | Facility: CLINIC | Age: 55
End: 2025-08-01
Payer: COMMERCIAL

## 2025-08-01 NOTE — TELEPHONE ENCOUNTER
Pt returned my call to inform the office she did not like how the valium made her feel. It made her feel groggy and lethargic.  She will not continue taking it, so there is no need to complete the lab work to continue taking said medication.

## 2025-08-01 NOTE — TELEPHONE ENCOUNTER
Left voicemail message for pt as a friendly reminder to have lab work done to fulfill contract and continue receiving medications.

## 2025-08-01 NOTE — TELEPHONE ENCOUNTER
----- Message from Yojana Hogue sent at 7/23/2025  8:06 AM EDT -----  Please remind her to get her blood work done. Thanks!  ----- Message -----  From: SYSTEM  Sent: 7/2/2025  12:30 AM EDT  To: Azul Clark MD

## 2025-08-05 ASSESSMENT — PROMIS GLOBAL HEALTH SCALE
RATE_GENERAL_HEALTH: FAIR
RATE_AVERAGE_FATIGUE: SEVERE
CARRYOUT_SOCIAL_ACTIVITIES: FAIR
RATE_SOCIAL_SATISFACTION: FAIR
RATE_PHYSICAL_HEALTH: FAIR
RATE_QUALITY_OF_LIFE: FAIR
RATE_MENTAL_HEALTH: FAIR
RATE_AVERAGE_PAIN: 7
CARRYOUT_PHYSICAL_ACTIVITIES: A LITTLE
EMOTIONAL_PROBLEMS: SOMETIMES

## 2025-08-11 ENCOUNTER — APPOINTMENT (OUTPATIENT)
Dept: PRIMARY CARE | Facility: CLINIC | Age: 55
End: 2025-08-11
Payer: COMMERCIAL

## 2025-08-11 VITALS
DIASTOLIC BLOOD PRESSURE: 70 MMHG | TEMPERATURE: 97 F | BODY MASS INDEX: 33.42 KG/M2 | SYSTOLIC BLOOD PRESSURE: 128 MMHG | OXYGEN SATURATION: 95 % | HEART RATE: 85 BPM | WEIGHT: 177 LBS | HEIGHT: 61 IN

## 2025-08-11 DIAGNOSIS — Z00.00 ANNUAL PHYSICAL EXAM: Primary | ICD-10-CM

## 2025-08-11 DIAGNOSIS — E66.811 CLASS 1 OBESITY WITH SERIOUS COMORBIDITY AND BODY MASS INDEX (BMI) OF 33.0 TO 33.9 IN ADULT, UNSPECIFIED OBESITY TYPE: ICD-10-CM

## 2025-08-11 DIAGNOSIS — E55.9 VITAMIN D DEFICIENCY: ICD-10-CM

## 2025-08-11 DIAGNOSIS — E78.01 FAMILIAL HYPERCHOLESTEROLEMIA: ICD-10-CM

## 2025-08-11 DIAGNOSIS — M79.18 MYOFASCIAL PAIN SYNDROME: ICD-10-CM

## 2025-08-11 PROCEDURE — 99214 OFFICE O/P EST MOD 30 MIN: CPT | Performed by: FAMILY MEDICINE

## 2025-08-11 PROCEDURE — 99396 PREV VISIT EST AGE 40-64: CPT | Performed by: FAMILY MEDICINE

## 2025-08-11 PROCEDURE — 3008F BODY MASS INDEX DOCD: CPT | Performed by: FAMILY MEDICINE

## 2025-08-11 RX ORDER — GABAPENTIN 100 MG/1
100 CAPSULE ORAL NIGHTLY
Qty: 30 CAPSULE | Refills: 0 | Status: SHIPPED | OUTPATIENT
Start: 2025-08-11

## 2025-08-11 RX ORDER — ROSUVASTATIN CALCIUM 20 MG/1
20 TABLET, COATED ORAL DAILY
Qty: 90 TABLET | Refills: 3 | Status: SHIPPED | OUTPATIENT
Start: 2025-08-11

## 2025-08-11 ASSESSMENT — ACTIVITIES OF DAILY LIVING (ADL)
DRESSING: INDEPENDENT
TAKING_MEDICATION: INDEPENDENT
DOING_HOUSEWORK: INDEPENDENT
MANAGING_FINANCES: INDEPENDENT
GROCERY_SHOPPING: INDEPENDENT
BATHING: INDEPENDENT

## 2025-08-11 ASSESSMENT — PATIENT HEALTH QUESTIONNAIRE - PHQ9
SUM OF ALL RESPONSES TO PHQ9 QUESTIONS 1 AND 2: 0
1. LITTLE INTEREST OR PLEASURE IN DOING THINGS: NOT AT ALL
2. FEELING DOWN, DEPRESSED OR HOPELESS: NOT AT ALL

## 2025-08-11 ASSESSMENT — ENCOUNTER SYMPTOMS
DEPRESSION: 0
OCCASIONAL FEELINGS OF UNSTEADINESS: 1
LOSS OF SENSATION IN FEET: 0

## 2025-08-12 ENCOUNTER — ANESTHESIA EVENT (OUTPATIENT)
Dept: OPERATING ROOM | Facility: CLINIC | Age: 55
End: 2025-08-12
Payer: COMMERCIAL

## 2025-08-12 PROBLEM — F32.A ANXIETY AND DEPRESSION: Status: ACTIVE | Noted: 2025-08-12

## 2025-08-12 PROBLEM — F41.9 ANXIETY AND DEPRESSION: Status: ACTIVE | Noted: 2025-08-12

## 2025-08-12 RX ORDER — OXYCODONE HYDROCHLORIDE 5 MG/1
5 TABLET ORAL EVERY 6 HOURS PRN
Qty: 12 TABLET | Refills: 0 | Status: SHIPPED | OUTPATIENT
Start: 2025-08-12

## 2025-08-13 ENCOUNTER — ANESTHESIA (OUTPATIENT)
Dept: OPERATING ROOM | Facility: CLINIC | Age: 55
End: 2025-08-13
Payer: COMMERCIAL

## 2025-08-13 ENCOUNTER — HOSPITAL ENCOUNTER (OUTPATIENT)
Facility: CLINIC | Age: 55
Setting detail: OUTPATIENT SURGERY
Discharge: HOME | End: 2025-08-13
Attending: OBSTETRICS & GYNECOLOGY | Admitting: OBSTETRICS & GYNECOLOGY
Payer: COMMERCIAL

## 2025-08-13 VITALS
HEART RATE: 76 BPM | HEIGHT: 61 IN | SYSTOLIC BLOOD PRESSURE: 143 MMHG | BODY MASS INDEX: 33.38 KG/M2 | DIASTOLIC BLOOD PRESSURE: 84 MMHG | RESPIRATION RATE: 16 BRPM | OXYGEN SATURATION: 94 % | WEIGHT: 176.81 LBS | TEMPERATURE: 97.3 F

## 2025-08-13 DIAGNOSIS — M79.18 MYOFASCIAL PAIN SYNDROME: ICD-10-CM

## 2025-08-13 DIAGNOSIS — G89.18 POSTOPERATIVE PAIN: Primary | ICD-10-CM

## 2025-08-13 PROCEDURE — 2500000001 HC RX 250 WO HCPCS SELF ADMINISTERED DRUGS (ALT 637 FOR MEDICARE OP): Performed by: STUDENT IN AN ORGANIZED HEALTH CARE EDUCATION/TRAINING PROGRAM

## 2025-08-13 PROCEDURE — 87626 HPV SEP HI-RSK TYP&POOL RSLT: CPT | Performed by: OBSTETRICS & GYNECOLOGY

## 2025-08-13 PROCEDURE — 7100000009 HC PHASE TWO TIME - INITIAL BASE CHARGE: Performed by: OBSTETRICS & GYNECOLOGY

## 2025-08-13 PROCEDURE — 3700000001 HC GENERAL ANESTHESIA TIME - INITIAL BASE CHARGE: Performed by: OBSTETRICS & GYNECOLOGY

## 2025-08-13 PROCEDURE — 2500000004 HC RX 250 GENERAL PHARMACY W/ HCPCS (ALT 636 FOR OP/ED): Performed by: NURSE ANESTHETIST, CERTIFIED REGISTERED

## 2025-08-13 PROCEDURE — 88175 CYTOPATH C/V AUTO FLUID REDO: CPT | Mod: TC,GCY | Performed by: OBSTETRICS & GYNECOLOGY

## 2025-08-13 PROCEDURE — 3600000008 HC OR TIME - EACH INCREMENTAL 1 MINUTE - PROCEDURE LEVEL THREE: Performed by: OBSTETRICS & GYNECOLOGY

## 2025-08-13 PROCEDURE — 2500000004 HC RX 250 GENERAL PHARMACY W/ HCPCS (ALT 636 FOR OP/ED): Mod: JZ | Performed by: NURSE ANESTHETIST, CERTIFIED REGISTERED

## 2025-08-13 PROCEDURE — 3600000003 HC OR TIME - INITIAL BASE CHARGE - PROCEDURE LEVEL THREE: Performed by: OBSTETRICS & GYNECOLOGY

## 2025-08-13 PROCEDURE — 2500000004 HC RX 250 GENERAL PHARMACY W/ HCPCS (ALT 636 FOR OP/ED): Mod: JZ | Performed by: OBSTETRICS & GYNECOLOGY

## 2025-08-13 PROCEDURE — 3700000002 HC GENERAL ANESTHESIA TIME - EACH INCREMENTAL 1 MINUTE: Performed by: OBSTETRICS & GYNECOLOGY

## 2025-08-13 PROCEDURE — 2500000005 HC RX 250 GENERAL PHARMACY W/O HCPCS: Performed by: OBSTETRICS & GYNECOLOGY

## 2025-08-13 PROCEDURE — 7100000010 HC PHASE TWO TIME - EACH INCREMENTAL 1 MINUTE: Performed by: OBSTETRICS & GYNECOLOGY

## 2025-08-13 RX ORDER — MIDAZOLAM HYDROCHLORIDE 1 MG/ML
INJECTION, SOLUTION INTRAMUSCULAR; INTRAVENOUS AS NEEDED
Status: DISCONTINUED | OUTPATIENT
Start: 2025-08-13 | End: 2025-08-13

## 2025-08-13 RX ORDER — DEXMEDETOMIDINE HYDROCHLORIDE 100 UG/ML
INJECTION, SOLUTION INTRAVENOUS AS NEEDED
Status: DISCONTINUED | OUTPATIENT
Start: 2025-08-13 | End: 2025-08-13

## 2025-08-13 RX ORDER — HYDROMORPHONE HYDROCHLORIDE 1 MG/ML
0.5 INJECTION, SOLUTION INTRAMUSCULAR; INTRAVENOUS; SUBCUTANEOUS EVERY 5 MIN PRN
Status: DISCONTINUED | OUTPATIENT
Start: 2025-08-13 | End: 2025-08-13 | Stop reason: HOSPADM

## 2025-08-13 RX ORDER — ACETAMINOPHEN 325 MG/1
650 TABLET ORAL EVERY 4 HOURS PRN
Status: DISCONTINUED | OUTPATIENT
Start: 2025-08-13 | End: 2025-08-13 | Stop reason: HOSPADM

## 2025-08-13 RX ORDER — LIDOCAINE HYDROCHLORIDE 10 MG/ML
0.1 INJECTION, SOLUTION EPIDURAL; INFILTRATION; INTRACAUDAL; PERINEURAL ONCE
Status: DISCONTINUED | OUTPATIENT
Start: 2025-08-13 | End: 2025-08-13 | Stop reason: HOSPADM

## 2025-08-13 RX ORDER — PROPOFOL 10 MG/ML
INJECTION, EMULSION INTRAVENOUS CONTINUOUS PRN
Status: DISCONTINUED | OUTPATIENT
Start: 2025-08-13 | End: 2025-08-13

## 2025-08-13 RX ORDER — ONDANSETRON HYDROCHLORIDE 2 MG/ML
INJECTION, SOLUTION INTRAVENOUS AS NEEDED
Status: DISCONTINUED | OUTPATIENT
Start: 2025-08-13 | End: 2025-08-13

## 2025-08-13 RX ORDER — OXYCODONE HYDROCHLORIDE 5 MG/1
10 TABLET ORAL EVERY 4 HOURS PRN
Status: DISCONTINUED | OUTPATIENT
Start: 2025-08-13 | End: 2025-08-13 | Stop reason: HOSPADM

## 2025-08-13 RX ORDER — KETOROLAC TROMETHAMINE 30 MG/ML
INJECTION, SOLUTION INTRAMUSCULAR; INTRAVENOUS AS NEEDED
Status: DISCONTINUED | OUTPATIENT
Start: 2025-08-13 | End: 2025-08-13

## 2025-08-13 RX ORDER — LIDOCAINE HYDROCHLORIDE 10 MG/ML
INJECTION, SOLUTION INFILTRATION; PERINEURAL AS NEEDED
Status: DISCONTINUED | OUTPATIENT
Start: 2025-08-13 | End: 2025-08-13 | Stop reason: HOSPADM

## 2025-08-13 RX ORDER — FENTANYL CITRATE 50 UG/ML
INJECTION, SOLUTION INTRAMUSCULAR; INTRAVENOUS AS NEEDED
Status: DISCONTINUED | OUTPATIENT
Start: 2025-08-13 | End: 2025-08-13

## 2025-08-13 RX ORDER — HYDROMORPHONE HYDROCHLORIDE 1 MG/ML
0.25 INJECTION, SOLUTION INTRAMUSCULAR; INTRAVENOUS; SUBCUTANEOUS EVERY 5 MIN PRN
Status: DISCONTINUED | OUTPATIENT
Start: 2025-08-13 | End: 2025-08-13 | Stop reason: HOSPADM

## 2025-08-13 RX ORDER — CHLORHEXIDINE GLUCONATE 40 MG/ML
SOLUTION TOPICAL AS NEEDED
Status: DISCONTINUED | OUTPATIENT
Start: 2025-08-13 | End: 2025-08-13 | Stop reason: HOSPADM

## 2025-08-13 RX ORDER — OXYCODONE HYDROCHLORIDE 5 MG/1
5 TABLET ORAL EVERY 4 HOURS PRN
Status: DISCONTINUED | OUTPATIENT
Start: 2025-08-13 | End: 2025-08-13 | Stop reason: HOSPADM

## 2025-08-13 RX ORDER — SODIUM CHLORIDE 0.9 % (FLUSH) 0.9 %
SYRINGE (ML) INJECTION AS NEEDED
Status: DISCONTINUED | OUTPATIENT
Start: 2025-08-13 | End: 2025-08-13 | Stop reason: HOSPADM

## 2025-08-13 RX ADMIN — DEXMEDETOMIDINE HYDROCHLORIDE 8 MCG: 100 INJECTION, SOLUTION INTRAVENOUS at 14:17

## 2025-08-13 RX ADMIN — PROPOFOL 160 MCG/KG/MIN: 10 INJECTION, EMULSION INTRAVENOUS at 14:15

## 2025-08-13 RX ADMIN — OXYCODONE HYDROCHLORIDE 5 MG: 5 TABLET ORAL at 15:05

## 2025-08-13 RX ADMIN — FENTANYL CITRATE 25 MCG: 50 INJECTION, SOLUTION INTRAMUSCULAR; INTRAVENOUS at 14:21

## 2025-08-13 RX ADMIN — FENTANYL CITRATE 50 MCG: 50 INJECTION, SOLUTION INTRAMUSCULAR; INTRAVENOUS at 14:10

## 2025-08-13 RX ADMIN — ONDANSETRON 4 MG: 2 INJECTION, SOLUTION INTRAMUSCULAR; INTRAVENOUS at 13:35

## 2025-08-13 RX ADMIN — SODIUM CHLORIDE, POTASSIUM CHLORIDE, SODIUM LACTATE AND CALCIUM CHLORIDE: 600; 310; 30; 20 INJECTION, SOLUTION INTRAVENOUS at 14:10

## 2025-08-13 RX ADMIN — KETOROLAC TROMETHAMINE 30 MG: 30 INJECTION, SOLUTION INTRAMUSCULAR; INTRAVENOUS at 14:29

## 2025-08-13 RX ADMIN — MIDAZOLAM 2 MG: 1 INJECTION INTRAMUSCULAR; INTRAVENOUS at 14:10

## 2025-08-13 ASSESSMENT — PAIN - FUNCTIONAL ASSESSMENT
PAIN_FUNCTIONAL_ASSESSMENT: UNABLE TO SELF-REPORT
PAIN_FUNCTIONAL_ASSESSMENT: 0-10

## 2025-08-13 ASSESSMENT — PAIN SCALES - GENERAL
PAINLEVEL_OUTOF10: 5 - MODERATE PAIN
PAINLEVEL_OUTOF10: 0 - NO PAIN
PAINLEVEL_OUTOF10: 7
PAINLEVEL_OUTOF10: 4
PAINLEVEL_OUTOF10: 4
PAINLEVEL_OUTOF10: 5 - MODERATE PAIN

## 2025-08-13 ASSESSMENT — PAIN DESCRIPTION - DESCRIPTORS: DESCRIPTORS: CRAMPING

## 2025-08-13 ASSESSMENT — PAIN DESCRIPTION - LOCATION: LOCATION: PERINEUM

## 2025-08-14 ENCOUNTER — TELEPHONE (OUTPATIENT)
Dept: OBSTETRICS AND GYNECOLOGY | Facility: CLINIC | Age: 55
End: 2025-08-14
Payer: COMMERCIAL

## 2025-08-15 LAB
HPV HR 12 DNA GENITAL QL NAA+PROBE: NEGATIVE
HPV HR GENOTYPES PNL CVX NAA+PROBE: NEGATIVE
HPV16 DNA SPEC QL NAA+PROBE: NEGATIVE
HPV18 DNA SPEC QL NAA+PROBE: NEGATIVE

## 2025-08-18 ENCOUNTER — TELEPHONE (OUTPATIENT)
Dept: OBSTETRICS AND GYNECOLOGY | Facility: CLINIC | Age: 55
End: 2025-08-18
Payer: COMMERCIAL

## 2025-08-19 LAB
CYTOLOGY CMNT CVX/VAG CYTO-IMP: NORMAL
LAB AP HPV GENOTYPE QUESTION: YES
LAB AP HPV HR: NORMAL
LABORATORY COMMENT REPORT: NORMAL
PATH REPORT.TOTAL CANCER: NORMAL

## 2025-08-25 PROBLEM — E66.811 CLASS 1 OBESITY WITH SERIOUS COMORBIDITY AND BODY MASS INDEX (BMI) OF 33.0 TO 33.9 IN ADULT: Status: ACTIVE | Noted: 2025-08-25

## 2025-08-26 ENCOUNTER — APPOINTMENT (OUTPATIENT)
Dept: OBSTETRICS AND GYNECOLOGY | Facility: CLINIC | Age: 55
End: 2025-08-26
Payer: COMMERCIAL

## 2025-08-28 ENCOUNTER — APPOINTMENT (OUTPATIENT)
Dept: DERMATOLOGY | Facility: CLINIC | Age: 55
End: 2025-08-28
Payer: COMMERCIAL

## 2025-09-03 ENCOUNTER — APPOINTMENT (OUTPATIENT)
Dept: DERMATOLOGY | Facility: CLINIC | Age: 55
End: 2025-09-03
Payer: COMMERCIAL

## 2025-09-04 DIAGNOSIS — Z79.899 BENZODIAZEPINE CONTRACT EXISTS: ICD-10-CM

## 2025-09-05 ENCOUNTER — OFFICE VISIT (OUTPATIENT)
Dept: URGENT CARE | Age: 55
End: 2025-09-05
Payer: COMMERCIAL

## 2025-09-05 VITALS
HEART RATE: 84 BPM | SYSTOLIC BLOOD PRESSURE: 138 MMHG | OXYGEN SATURATION: 99 % | TEMPERATURE: 99.3 F | RESPIRATION RATE: 16 BRPM | DIASTOLIC BLOOD PRESSURE: 84 MMHG

## 2025-09-05 DIAGNOSIS — Z20.822 EXPOSURE TO COVID-19 VIRUS: ICD-10-CM

## 2025-09-05 DIAGNOSIS — J02.9 SORE THROAT: Primary | ICD-10-CM

## 2025-09-05 LAB
POC CORONAVIRUS SARS-COV-2 PCR: NEGATIVE
POC HUMAN RHINOVIRUS PCR: NEGATIVE
POC INFLUENZA A VIRUS PCR: NEGATIVE
POC INFLUENZA B VIRUS PCR: NEGATIVE
POC RAPID STREP: NEGATIVE
POC RESPIRATORY SYNCYTIAL VIRUS PCR: NEGATIVE

## 2025-09-05 PROCEDURE — 1036F TOBACCO NON-USER: CPT | Performed by: STUDENT IN AN ORGANIZED HEALTH CARE EDUCATION/TRAINING PROGRAM

## 2025-09-05 PROCEDURE — 87631 RESP VIRUS 3-5 TARGETS: CPT | Performed by: STUDENT IN AN ORGANIZED HEALTH CARE EDUCATION/TRAINING PROGRAM

## 2025-09-05 PROCEDURE — 99203 OFFICE O/P NEW LOW 30 MIN: CPT | Performed by: STUDENT IN AN ORGANIZED HEALTH CARE EDUCATION/TRAINING PROGRAM

## 2025-09-05 PROCEDURE — 87880 STREP A ASSAY W/OPTIC: CPT | Performed by: STUDENT IN AN ORGANIZED HEALTH CARE EDUCATION/TRAINING PROGRAM

## 2025-09-05 ASSESSMENT — ENCOUNTER SYMPTOMS
CARDIOVASCULAR NEGATIVE: 1
FEVER: 0
FATIGUE: 1
COUGH: 0
GASTROINTESTINAL NEGATIVE: 1
SORE THROAT: 1

## 2025-09-05 ASSESSMENT — PAIN SCALES - GENERAL: PAINLEVEL_OUTOF10: 0-NO PAIN

## 2025-09-23 ENCOUNTER — APPOINTMENT (OUTPATIENT)
Facility: CLINIC | Age: 55
End: 2025-09-23
Payer: COMMERCIAL

## 2025-10-07 ENCOUNTER — APPOINTMENT (OUTPATIENT)
Dept: OBSTETRICS AND GYNECOLOGY | Facility: CLINIC | Age: 55
End: 2025-10-07
Payer: COMMERCIAL

## 2026-01-14 ENCOUNTER — APPOINTMENT (OUTPATIENT)
Dept: DERMATOLOGY | Facility: CLINIC | Age: 56
End: 2026-01-14
Payer: COMMERCIAL

## (undated) DEVICE — SOLUTION, IRRIGATION, STERILE WATER, 1000 ML, POUR BOTTLE

## (undated) DEVICE — CARE KIT, LAPAROSCOPIC, ADVANCED

## (undated) DEVICE — SYRINGE, 10 CC, LUER LOCK

## (undated) DEVICE — SEAL, UNIVERSAL 5-8MM  XI

## (undated) DEVICE — TOWEL PACK, STERILE, 4/PACK, BLUE

## (undated) DEVICE — GLOVE, SURGICAL, PROTEXIS PI , 7.0, PF, LF

## (undated) DEVICE — POSITIONING KIT, PAGAZZI, PINK PAD XL, W/ ARM AND HEAD REST

## (undated) DEVICE — DRAPE, ARM XI

## (undated) DEVICE — SLEEVE, SCD EXPRESS, KNEE LENGTH-MEDIUM

## (undated) DEVICE — ADHESIVE, SKIN, LIQUIBAND EXCEED

## (undated) DEVICE — DRESSING, GAUZE, SPONGE, 12 PLY, 4 X 4 IN, PLASTIC POUCH, STRL 10PK

## (undated) DEVICE — SYSTEM, TROCAR LAP, 5X100MM, SHIELD BLADED, KII ADVANCED FIX ABDOMINAL

## (undated) DEVICE — GLOVE, SURGICAL, PROTEXIS PI BLUE W/NEUTHERA, 6.5, PF, LF

## (undated) DEVICE — Device

## (undated) DEVICE — SUTURE, ETHIBOND EXCEL 1, TAPER POINT CT-1 GREEN 30 INCH

## (undated) DEVICE — DRAPE, SHEET, UTILITY, NON ABSORBENT, 18 X 26 IN, LF

## (undated) DEVICE — GOWN, SURGICAL, SMARTGOWN, XLARGE, STERILE

## (undated) DEVICE — TOWEL, SURGICAL, NEURO, O/R, 16 X 26, BLUE, STERILE

## (undated) DEVICE — GLOVE, SURGICAL, PROTEXIS PI , 6.5, PF, LF

## (undated) DEVICE — NERVE BLOCK, STIMUQUIK, SINGLE-SHOT, 21GA X 3-1/2

## (undated) DEVICE — SOLUTION, IRRIGATION, SODIUM CHLORIDE 0.9%, 1000 ML, POUR BOTTLE

## (undated) DEVICE — SPONGE, LAP, XRAY DECT, 18IN X 18IN, W/MASTER DMT, STERILE

## (undated) DEVICE — DRAIN, CHANNEL, HUBLESS, ROUND, FULL FLUTE, 19FR

## (undated) DEVICE — TUBE SET, PNEUMOCLEAR, SMOKE EVACU, HIGH-FLOW

## (undated) DEVICE — SUTURE, VICRYL, 3-0, 27 IN, SH

## (undated) DEVICE — SUTURE, MONOCRYL, 4-0, 18 IN, PS2, UNDYED